# Patient Record
Sex: FEMALE | Race: WHITE | Employment: FULL TIME | URBAN - NONMETROPOLITAN AREA
[De-identification: names, ages, dates, MRNs, and addresses within clinical notes are randomized per-mention and may not be internally consistent; named-entity substitution may affect disease eponyms.]

---

## 2017-04-28 ENCOUNTER — OFFICE VISIT (OUTPATIENT)
Dept: FAMILY MEDICINE CLINIC | Age: 47
End: 2017-04-28

## 2017-04-28 VITALS
TEMPERATURE: 98.1 F | RESPIRATION RATE: 16 BRPM | WEIGHT: 147.8 LBS | DIASTOLIC BLOOD PRESSURE: 76 MMHG | HEART RATE: 73 BPM | BODY MASS INDEX: 27.2 KG/M2 | SYSTOLIC BLOOD PRESSURE: 124 MMHG | HEIGHT: 62 IN

## 2017-04-28 DIAGNOSIS — F41.9 ANXIETY: Primary | ICD-10-CM

## 2017-04-28 DIAGNOSIS — E03.4 HYPOTHYROIDISM DUE TO ACQUIRED ATROPHY OF THYROID: ICD-10-CM

## 2017-04-28 DIAGNOSIS — F32.A DEPRESSION, UNSPECIFIED DEPRESSION TYPE: ICD-10-CM

## 2017-04-28 DIAGNOSIS — R23.2 HOT FLASHES: ICD-10-CM

## 2017-04-28 PROCEDURE — 99214 OFFICE O/P EST MOD 30 MIN: CPT | Performed by: FAMILY MEDICINE

## 2017-04-28 RX ORDER — VENLAFAXINE HYDROCHLORIDE 150 MG/1
150 CAPSULE, EXTENDED RELEASE ORAL DAILY
Qty: 30 CAPSULE | Refills: 3 | Status: SHIPPED | OUTPATIENT
Start: 2017-04-28 | End: 2017-09-07 | Stop reason: SDUPTHER

## 2017-04-28 ASSESSMENT — ENCOUNTER SYMPTOMS
CHEST TIGHTNESS: 0
BLOOD IN STOOL: 0
EYE PAIN: 0
COUGH: 0
RHINORRHEA: 0
SHORTNESS OF BREATH: 0
NAUSEA: 0
DIARRHEA: 0
CONSTIPATION: 0
SORE THROAT: 0
WHEEZING: 0
VOMITING: 0
ABDOMINAL PAIN: 0
BACK PAIN: 0

## 2017-04-29 LAB
ABSOLUTE BASO #: 0.1 K/UL (ref 0–0.1)
ABSOLUTE EOS #: 0.1 K/UL (ref 0.1–0.4)
ABSOLUTE LYMPH #: 1.5 K/UL (ref 0.8–5.2)
ABSOLUTE MONO #: 0.5 K/UL (ref 0.1–0.9)
ABSOLUTE NEUT #: 2.7 K/UL (ref 1.3–9.1)
ALBUMIN SERPL-MCNC: 4.5 G/DL (ref 3.2–5.3)
ALK PHOSPHATASE: 58 IU/L (ref 35–121)
ALT SERPL-CCNC: 16 IU/L (ref 5–59)
ANION GAP SERPL CALCULATED.3IONS-SCNC: 9 MMOL/L
AST SERPL-CCNC: 18 IU/L (ref 10–42)
BASOPHILS RELATIVE PERCENT: 1.3 %
BILIRUB SERPL-MCNC: 0.5 MG/DL (ref 0.2–1.3)
BUN BLDV-MCNC: 12 MG/DL (ref 10–20)
CALCIUM SERPL-MCNC: 9.9 MG/DL (ref 8.7–10.8)
CHLORIDE BLD-SCNC: 103 MMOL/L (ref 95–111)
CO2: 32 MMOL/L (ref 21–32)
CREAT SERPL-MCNC: 0.8 MG/DL (ref 0.5–1.3)
EGFR AFRICAN AMERICAN: 94
EGFR IF NONAFRICAN AMERICAN: 78
EOSINOPHILS RELATIVE PERCENT: 2.1 %
FOLLICLE STIMULATING HORMONE: 15.7 MIU/ML
GLUCOSE: 89 MG/DL (ref 70–100)
HCT VFR BLD CALC: 45 % (ref 36–48)
HEMOGLOBIN: 14.9 G/DL (ref 12–16)
LYMPHOCYTE %: 30.8 %
MCH RBC QN AUTO: 29.6 PG (ref 27–34)
MCHC RBC AUTO-ENTMCNC: 33.1 G/DL (ref 31–36)
MCV RBC AUTO: 89.3 FL (ref 80–100)
MONOCYTES # BLD: 10 %
NEUTROPHILS RELATIVE PERCENT: 55.6 %
PDW BLD-RTO: 12.5 % (ref 10.8–14.8)
PLATELETS: 216 K/UL (ref 150–450)
POTASSIUM SERPL-SCNC: 4.4 MMOL/L (ref 3.5–5.4)
RBC: 5.04 M/UL (ref 4–5.5)
SODIUM BLD-SCNC: 140 MMOL/L (ref 134–147)
T4 FREE: 1.06 NG/DL (ref 0.8–1.8)
TOTAL PROTEIN: 7.2 G/DL (ref 5.8–8)
TSH SERPL DL<=0.05 MIU/L-ACNC: 3.16 UIU/ML (ref 0.4–4.4)
WBC: 4.8 K/UL (ref 3.7–10.8)

## 2017-05-01 LAB — ESTROGEN TOTAL: 234 PG/ML

## 2017-05-02 ENCOUNTER — TELEPHONE (OUTPATIENT)
Dept: FAMILY MEDICINE CLINIC | Age: 47
End: 2017-05-02

## 2017-08-15 ENCOUNTER — TELEPHONE (OUTPATIENT)
Dept: FAMILY MEDICINE CLINIC | Age: 47
End: 2017-08-15

## 2017-08-15 DIAGNOSIS — R10.84 GENERALIZED ABDOMINAL PAIN: Primary | ICD-10-CM

## 2017-08-15 RX ORDER — SUCRALFATE 1 G/1
1 TABLET ORAL 4 TIMES DAILY
Qty: 120 TABLET | Refills: 5 | Status: SHIPPED | OUTPATIENT
Start: 2017-08-15 | End: 2018-04-30 | Stop reason: ALTCHOICE

## 2017-09-07 DIAGNOSIS — F41.9 ANXIETY: ICD-10-CM

## 2017-09-07 DIAGNOSIS — F32.A DEPRESSION, UNSPECIFIED DEPRESSION TYPE: ICD-10-CM

## 2017-09-07 RX ORDER — VENLAFAXINE HYDROCHLORIDE 150 MG/1
CAPSULE, EXTENDED RELEASE ORAL
Qty: 30 CAPSULE | Refills: 3 | Status: SHIPPED | OUTPATIENT
Start: 2017-09-07 | End: 2018-01-18 | Stop reason: SDUPTHER

## 2018-01-18 DIAGNOSIS — F32.A DEPRESSION, UNSPECIFIED DEPRESSION TYPE: ICD-10-CM

## 2018-01-18 DIAGNOSIS — F41.9 ANXIETY: ICD-10-CM

## 2018-01-18 RX ORDER — VENLAFAXINE HYDROCHLORIDE 150 MG/1
CAPSULE, EXTENDED RELEASE ORAL
Qty: 30 CAPSULE | Refills: 2 | Status: SHIPPED | OUTPATIENT
Start: 2018-01-18 | End: 2018-04-27 | Stop reason: SDUPTHER

## 2018-04-27 DIAGNOSIS — F41.9 ANXIETY: ICD-10-CM

## 2018-04-27 DIAGNOSIS — F32.A DEPRESSION, UNSPECIFIED DEPRESSION TYPE: ICD-10-CM

## 2018-04-27 RX ORDER — VENLAFAXINE HYDROCHLORIDE 150 MG/1
CAPSULE, EXTENDED RELEASE ORAL
Qty: 30 CAPSULE | Refills: 0 | Status: SHIPPED | OUTPATIENT
Start: 2018-04-27 | End: 2018-04-30 | Stop reason: SDUPTHER

## 2018-04-30 ENCOUNTER — OFFICE VISIT (OUTPATIENT)
Dept: FAMILY MEDICINE CLINIC | Age: 48
End: 2018-04-30
Payer: COMMERCIAL

## 2018-04-30 VITALS
DIASTOLIC BLOOD PRESSURE: 84 MMHG | SYSTOLIC BLOOD PRESSURE: 122 MMHG | BODY MASS INDEX: 25.73 KG/M2 | RESPIRATION RATE: 12 BRPM | HEIGHT: 63 IN | HEART RATE: 72 BPM | WEIGHT: 145.2 LBS

## 2018-04-30 DIAGNOSIS — F32.A DEPRESSION, UNSPECIFIED DEPRESSION TYPE: ICD-10-CM

## 2018-04-30 DIAGNOSIS — E03.9 HYPOTHYROIDISM, UNSPECIFIED TYPE: ICD-10-CM

## 2018-04-30 DIAGNOSIS — Z00.00 WELL ADULT EXAM: Primary | ICD-10-CM

## 2018-04-30 DIAGNOSIS — F41.9 ANXIETY: ICD-10-CM

## 2018-04-30 PROCEDURE — 99396 PREV VISIT EST AGE 40-64: CPT | Performed by: FAMILY MEDICINE

## 2018-04-30 RX ORDER — LEVOTHYROXINE SODIUM 0.05 MG/1
50 TABLET ORAL DAILY
Qty: 30 TABLET | Refills: 11 | Status: SHIPPED | OUTPATIENT
Start: 2018-04-30 | End: 2018-10-31 | Stop reason: SDUPTHER

## 2018-04-30 RX ORDER — VENLAFAXINE HYDROCHLORIDE 150 MG/1
CAPSULE, EXTENDED RELEASE ORAL
Qty: 30 CAPSULE | Refills: 11 | Status: SHIPPED | OUTPATIENT
Start: 2018-04-30 | End: 2018-10-31 | Stop reason: SDUPTHER

## 2018-04-30 ASSESSMENT — ENCOUNTER SYMPTOMS
VOMITING: 0
DIARRHEA: 0
RHINORRHEA: 0
BACK PAIN: 0
ABDOMINAL PAIN: 0
CONSTIPATION: 0
COUGH: 0
SHORTNESS OF BREATH: 0
NAUSEA: 0
BLOOD IN STOOL: 0
EYE PAIN: 0
SORE THROAT: 0
CHEST TIGHTNESS: 0
WHEEZING: 0

## 2018-04-30 ASSESSMENT — PATIENT HEALTH QUESTIONNAIRE - PHQ9
SUM OF ALL RESPONSES TO PHQ QUESTIONS 1-9: 0
2. FEELING DOWN, DEPRESSED OR HOPELESS: 0
SUM OF ALL RESPONSES TO PHQ9 QUESTIONS 1 & 2: 0
1. LITTLE INTEREST OR PLEASURE IN DOING THINGS: 0

## 2018-05-01 ENCOUNTER — TELEPHONE (OUTPATIENT)
Dept: FAMILY MEDICINE CLINIC | Age: 48
End: 2018-05-01

## 2018-05-01 LAB
ABSOLUTE BASO #: 0.1 K/UL (ref 0–0.1)
ABSOLUTE EOS #: 0.1 K/UL (ref 0.1–0.4)
ABSOLUTE LYMPH #: 1.2 K/UL (ref 0.8–5.2)
ABSOLUTE MONO #: 0.6 K/UL (ref 0.1–0.9)
ABSOLUTE NEUT #: 2.6 K/UL (ref 1.3–9.1)
ALBUMIN SERPL-MCNC: 4.2 G/DL (ref 3.5–5.2)
ALK PHOSPHATASE: 40 U/L (ref 30–101)
ALT SERPL-CCNC: 26 U/L (ref 5–40)
ANION GAP SERPL CALCULATED.3IONS-SCNC: 10 MEQ/L (ref 10–19)
AST SERPL-CCNC: 20 U/L (ref 9–40)
BASOPHILS RELATIVE PERCENT: 1.5 %
BILIRUB SERPL-MCNC: 0.5 MG/DL
BUN BLDV-MCNC: 15 MG/DL (ref 8–23)
CALCIUM SERPL-MCNC: 9.6 MG/DL (ref 8.5–10.5)
CHLORIDE BLD-SCNC: 100 MEQ/L (ref 95–107)
CHOLESTEROL/HDL RATIO: 4.1
CHOLESTEROL: 217 MG/DL
CO2: 31 MEQ/L (ref 19–31)
CREAT SERPL-MCNC: 0.8 MG/DL (ref 0.6–1.3)
EGFR AFRICAN AMERICAN: 102.5 ML/MIN/1.73 M2
EGFR IF NONAFRICAN AMERICAN: 88.4 ML/MIN/1.73 M2
EOSINOPHILS RELATIVE PERCENT: 2.4 %
GLUCOSE: 90 MG/DL (ref 70–99)
HCT VFR BLD CALC: 46.6 % (ref 36–48)
HDLC SERPL-MCNC: 53 MG/DL
HEMOGLOBIN: 15.5 G/DL (ref 12–16)
LDL CHOLESTEROL CALCULATED: 146 MG/DL
LDL/HDL RATIO: 2.8
LYMPHOCYTE %: 27 %
MCH RBC QN AUTO: 29.5 PG (ref 27–34)
MCHC RBC AUTO-ENTMCNC: 33.3 G/DL (ref 31–36)
MCV RBC AUTO: 88.6 FL (ref 80–100)
MONOCYTES # BLD: 12.7 %
NEUTROPHILS RELATIVE PERCENT: 56.2 %
PDW BLD-RTO: 12.5 % (ref 10.8–14.8)
PLATELETS: 224 K/UL (ref 150–450)
POTASSIUM SERPL-SCNC: 4.3 MEQ/L (ref 3.5–5.4)
RBC: 5.26 M/UL (ref 4–5.5)
SODIUM BLD-SCNC: 141 MEQ/L (ref 135–146)
T4 FREE: 1.15 NG/DL (ref 0.8–1.9)
TOTAL PROTEIN: 5.9 G/DL (ref 6.1–8.3)
TRIGL SERPL-MCNC: 89 MG/DL
TSH SERPL DL<=0.05 MIU/L-ACNC: 5.79 UIU/ML (ref 0.4–4.1)
VLDLC SERPL CALC-MCNC: 18 MG/DL
WBC: 4.6 K/UL (ref 3.7–10.8)

## 2018-10-31 DIAGNOSIS — F41.9 ANXIETY: ICD-10-CM

## 2018-10-31 DIAGNOSIS — F32.A DEPRESSION, UNSPECIFIED DEPRESSION TYPE: ICD-10-CM

## 2018-10-31 DIAGNOSIS — E03.9 HYPOTHYROIDISM, UNSPECIFIED TYPE: ICD-10-CM

## 2018-10-31 RX ORDER — VENLAFAXINE HYDROCHLORIDE 150 MG/1
CAPSULE, EXTENDED RELEASE ORAL
Qty: 90 CAPSULE | Refills: 1 | Status: SHIPPED | OUTPATIENT
Start: 2018-10-31 | End: 2019-03-01 | Stop reason: SDUPTHER

## 2018-10-31 RX ORDER — LEVOTHYROXINE SODIUM 0.05 MG/1
50 TABLET ORAL DAILY
Qty: 90 TABLET | Refills: 1 | Status: SHIPPED | OUTPATIENT
Start: 2018-10-31 | End: 2019-03-26 | Stop reason: SDUPTHER

## 2019-02-22 ENCOUNTER — TELEPHONE (OUTPATIENT)
Dept: FAMILY MEDICINE CLINIC | Age: 49
End: 2019-02-22

## 2019-03-01 DIAGNOSIS — F32.A DEPRESSION, UNSPECIFIED DEPRESSION TYPE: ICD-10-CM

## 2019-03-01 DIAGNOSIS — F41.9 ANXIETY: ICD-10-CM

## 2019-03-01 RX ORDER — VENLAFAXINE HYDROCHLORIDE 150 MG/1
CAPSULE, EXTENDED RELEASE ORAL
Qty: 90 CAPSULE | Refills: 0 | Status: SHIPPED | OUTPATIENT
Start: 2019-03-01 | End: 2019-03-26 | Stop reason: SDUPTHER

## 2019-03-26 ENCOUNTER — OFFICE VISIT (OUTPATIENT)
Dept: FAMILY MEDICINE CLINIC | Age: 49
End: 2019-03-26
Payer: COMMERCIAL

## 2019-03-26 VITALS
BODY MASS INDEX: 27 KG/M2 | RESPIRATION RATE: 12 BRPM | HEART RATE: 72 BPM | HEIGHT: 63 IN | SYSTOLIC BLOOD PRESSURE: 128 MMHG | WEIGHT: 152.4 LBS | DIASTOLIC BLOOD PRESSURE: 80 MMHG

## 2019-03-26 DIAGNOSIS — F41.9 ANXIETY: ICD-10-CM

## 2019-03-26 DIAGNOSIS — Z00.00 WELL ADULT EXAM: Primary | ICD-10-CM

## 2019-03-26 DIAGNOSIS — F32.A DEPRESSION, UNSPECIFIED DEPRESSION TYPE: ICD-10-CM

## 2019-03-26 DIAGNOSIS — E78.00 HYPERCHOLESTEROLEMIA: ICD-10-CM

## 2019-03-26 DIAGNOSIS — E03.9 HYPOTHYROIDISM, UNSPECIFIED TYPE: ICD-10-CM

## 2019-03-26 LAB
ALBUMIN SERPL-MCNC: 4.4 G/DL (ref 3.5–5.1)
ALP BLD-CCNC: 61 U/L (ref 38–126)
ALT SERPL-CCNC: 11 U/L (ref 11–66)
ANION GAP SERPL CALCULATED.3IONS-SCNC: 12 MEQ/L (ref 8–16)
AST SERPL-CCNC: 17 U/L (ref 5–40)
BASOPHILS # BLD: 1.2 %
BASOPHILS ABSOLUTE: 0.1 THOU/MM3 (ref 0–0.1)
BILIRUB SERPL-MCNC: 0.7 MG/DL (ref 0.3–1.2)
BUN BLDV-MCNC: 15 MG/DL (ref 7–22)
CALCIUM SERPL-MCNC: 9.6 MG/DL (ref 8.5–10.5)
CHLORIDE BLD-SCNC: 103 MEQ/L (ref 98–111)
CHOLESTEROL, TOTAL: 199 MG/DL (ref 100–199)
CO2: 28 MEQ/L (ref 23–33)
CREAT SERPL-MCNC: 0.7 MG/DL (ref 0.4–1.2)
EOSINOPHIL # BLD: 3 %
EOSINOPHILS ABSOLUTE: 0.2 THOU/MM3 (ref 0–0.4)
ERYTHROCYTE [DISTWIDTH] IN BLOOD BY AUTOMATED COUNT: 12.9 % (ref 11.5–14.5)
ERYTHROCYTE [DISTWIDTH] IN BLOOD BY AUTOMATED COUNT: 44.7 FL (ref 35–45)
GFR SERPL CREATININE-BSD FRML MDRD: 89 ML/MIN/1.73M2
GLUCOSE BLD-MCNC: 90 MG/DL (ref 70–108)
HCT VFR BLD CALC: 46 % (ref 37–47)
HDLC SERPL-MCNC: 60 MG/DL
HEMOGLOBIN: 14.6 GM/DL (ref 12–16)
IMMATURE GRANS (ABS): 0.01 THOU/MM3 (ref 0–0.07)
IMMATURE GRANULOCYTES: 0.2 %
LDL CHOLESTEROL CALCULATED: 123 MG/DL
LYMPHOCYTES # BLD: 26.5 %
LYMPHOCYTES ABSOLUTE: 1.4 THOU/MM3 (ref 1–4.8)
MCH RBC QN AUTO: 29.7 PG (ref 26–33)
MCHC RBC AUTO-ENTMCNC: 31.7 GM/DL (ref 32.2–35.5)
MCV RBC AUTO: 93.7 FL (ref 81–99)
MONOCYTES # BLD: 12.1 %
MONOCYTES ABSOLUTE: 0.6 THOU/MM3 (ref 0.4–1.3)
NUCLEATED RED BLOOD CELLS: 0 /100 WBC
PLATELET # BLD: 223 THOU/MM3 (ref 130–400)
PMV BLD AUTO: 11.2 FL (ref 9.4–12.4)
POTASSIUM SERPL-SCNC: 5.1 MEQ/L (ref 3.5–5.2)
RBC # BLD: 4.91 MILL/MM3 (ref 4.2–5.4)
SEG NEUTROPHILS: 57 %
SEGMENTED NEUTROPHILS ABSOLUTE COUNT: 2.9 THOU/MM3 (ref 1.8–7.7)
SODIUM BLD-SCNC: 143 MEQ/L (ref 135–145)
TOTAL PROTEIN: 7.1 G/DL (ref 6.1–8)
TRIGL SERPL-MCNC: 82 MG/DL (ref 0–199)
TSH SERPL DL<=0.05 MIU/L-ACNC: 4.34 UIU/ML (ref 0.4–4.2)
WBC # BLD: 5.1 THOU/MM3 (ref 4.8–10.8)

## 2019-03-26 PROCEDURE — 99396 PREV VISIT EST AGE 40-64: CPT | Performed by: FAMILY MEDICINE

## 2019-03-26 PROCEDURE — 36415 COLL VENOUS BLD VENIPUNCTURE: CPT | Performed by: FAMILY MEDICINE

## 2019-03-26 RX ORDER — VENLAFAXINE HYDROCHLORIDE 150 MG/1
CAPSULE, EXTENDED RELEASE ORAL
Qty: 90 CAPSULE | Refills: 3 | Status: SHIPPED | OUTPATIENT
Start: 2019-03-26 | End: 2019-05-22 | Stop reason: SDUPTHER

## 2019-03-26 RX ORDER — LEVOTHYROXINE SODIUM 0.05 MG/1
50 TABLET ORAL DAILY
Qty: 90 TABLET | Refills: 3 | Status: SHIPPED | OUTPATIENT
Start: 2019-03-26 | End: 2019-03-27 | Stop reason: DRUGHIGH

## 2019-03-26 ASSESSMENT — ENCOUNTER SYMPTOMS
RHINORRHEA: 0
VOMITING: 0
ABDOMINAL PAIN: 0
BACK PAIN: 0
EYE PAIN: 0
SORE THROAT: 0
NAUSEA: 0
SHORTNESS OF BREATH: 0
CHEST TIGHTNESS: 0
BLOOD IN STOOL: 0
WHEEZING: 0
DIARRHEA: 0
COUGH: 0
CONSTIPATION: 0

## 2019-03-26 ASSESSMENT — PATIENT HEALTH QUESTIONNAIRE - PHQ9
SUM OF ALL RESPONSES TO PHQ QUESTIONS 1-9: 0
SUM OF ALL RESPONSES TO PHQ QUESTIONS 1-9: 0
SUM OF ALL RESPONSES TO PHQ9 QUESTIONS 1 & 2: 0
2. FEELING DOWN, DEPRESSED OR HOPELESS: 0
1. LITTLE INTEREST OR PLEASURE IN DOING THINGS: 0

## 2019-03-27 ENCOUNTER — TELEPHONE (OUTPATIENT)
Dept: FAMILY MEDICINE CLINIC | Age: 49
End: 2019-03-27

## 2019-03-27 DIAGNOSIS — E03.4 HYPOTHYROIDISM DUE TO ACQUIRED ATROPHY OF THYROID: Primary | ICD-10-CM

## 2019-03-27 LAB — T4 FREE: 1.41 NG/DL (ref 0.93–1.76)

## 2019-03-27 RX ORDER — LEVOTHYROXINE SODIUM 0.07 MG/1
75 TABLET ORAL DAILY
Qty: 90 TABLET | Refills: 3 | Status: SHIPPED | OUTPATIENT
Start: 2019-03-27 | End: 2019-05-22 | Stop reason: SDUPTHER

## 2019-05-22 DIAGNOSIS — E03.4 HYPOTHYROIDISM DUE TO ACQUIRED ATROPHY OF THYROID: ICD-10-CM

## 2019-05-22 DIAGNOSIS — F32.A DEPRESSION, UNSPECIFIED DEPRESSION TYPE: ICD-10-CM

## 2019-05-22 DIAGNOSIS — F41.9 ANXIETY: ICD-10-CM

## 2019-05-22 RX ORDER — VENLAFAXINE HYDROCHLORIDE 150 MG/1
CAPSULE, EXTENDED RELEASE ORAL
Qty: 90 CAPSULE | Refills: 2 | Status: SHIPPED | OUTPATIENT
Start: 2019-05-22 | End: 2020-04-08 | Stop reason: SDUPTHER

## 2019-05-22 RX ORDER — LEVOTHYROXINE SODIUM 0.07 MG/1
75 TABLET ORAL DAILY
Qty: 90 TABLET | Refills: 2 | Status: SHIPPED | OUTPATIENT
Start: 2019-05-22 | End: 2020-04-08 | Stop reason: SDUPTHER

## 2019-05-22 NOTE — TELEPHONE ENCOUNTER
Patient states that she can not longer get her long term meds locally, she has to go through her mail away, [facility]. Can you send her Synthroid 50 mcg and Effexor 150 mg to them for her?   Call her back at [phone] only if any problems

## 2019-06-10 ENCOUNTER — TELEPHONE (OUTPATIENT)
Dept: FAMILY MEDICINE CLINIC | Age: 49
End: 2019-06-10

## 2019-07-02 ENCOUNTER — TELEPHONE (OUTPATIENT)
Dept: FAMILY MEDICINE CLINIC | Age: 49
End: 2019-07-02

## 2019-08-22 ENCOUNTER — OFFICE VISIT (OUTPATIENT)
Dept: FAMILY MEDICINE CLINIC | Age: 49
End: 2019-08-22
Payer: COMMERCIAL

## 2019-08-22 VITALS
SYSTOLIC BLOOD PRESSURE: 132 MMHG | WEIGHT: 162 LBS | BODY MASS INDEX: 28.7 KG/M2 | HEART RATE: 84 BPM | RESPIRATION RATE: 12 BRPM | DIASTOLIC BLOOD PRESSURE: 86 MMHG

## 2019-08-22 DIAGNOSIS — F32.A ACUTE DEPRESSION: Primary | ICD-10-CM

## 2019-08-22 DIAGNOSIS — R41.840 POOR CONCENTRATION: ICD-10-CM

## 2019-08-22 PROCEDURE — 99213 OFFICE O/P EST LOW 20 MIN: CPT | Performed by: FAMILY MEDICINE

## 2019-08-22 RX ORDER — DEXTROAMPHETAMINE SACCHARATE, AMPHETAMINE ASPARTATE MONOHYDRATE, DEXTROAMPHETAMINE SULFATE AND AMPHETAMINE SULFATE 3.75; 3.75; 3.75; 3.75 MG/1; MG/1; MG/1; MG/1
15 CAPSULE, EXTENDED RELEASE ORAL EVERY MORNING
Qty: 30 CAPSULE | Refills: 0 | Status: SHIPPED | OUTPATIENT
Start: 2019-08-22 | End: 2019-09-17 | Stop reason: SDUPTHER

## 2019-08-22 ASSESSMENT — ENCOUNTER SYMPTOMS
CONSTIPATION: 0
RHINORRHEA: 0
VOMITING: 0
SHORTNESS OF BREATH: 0
COUGH: 0
DIARRHEA: 0
EYE PAIN: 0
SORE THROAT: 0
NAUSEA: 1
ABDOMINAL PAIN: 0
WHEEZING: 0
CHEST TIGHTNESS: 0
BACK PAIN: 0
BLOOD IN STOOL: 0

## 2019-08-22 NOTE — PATIENT INSTRUCTIONS
\"I am hopeful\"; \"Things will get better\"; and \"I can ask for the help I need. \" Write down these statements and read them often, even if you don't believe them yet. · Be patient with yourself. It took time for your depression to develop, and it will take time for your symptoms to improve. Do not take on too much or be too hard on yourself. · Learn all you can about depression from written and online materials. · Check out behavioral health classes to learn more about dealing with depression. · Keep the numbers for these national suicide hotlines: 4-323-106-TALK (3-973.797.5496) and 9-513-NFASCRJ (3-545.626.8448). If you or someone you know talks about suicide or feeling hopeless, get help right away. When should you call for help? Call 911 anytime you think you may need emergency care. For example, call if:    · You feel you cannot stop from hurting yourself or someone else.   [facility] your doctor now or seek immediate medical care if:    · You hear voices.     · You feel much more depressed.    Watch closely for changes in your health, and be sure to contact your doctor if:    · You are having problems with your depression medicine.     · You are not getting better as expected. Where can you learn more? Go to [email or web address] [email or web address].[email or web address]. org and sign in to your [employer] account. Enter J411 in the [employer] box to learn more about \"Depression Treatment: Care Instructions. \"     If you do not have an account, please click on the \"Sign Up Now\" link. Current as of: September 11, 2018  Content Version: 12.1  © 5232-4320 Healthwise, Incorporated. Care instructions adapted under license by [facility] ([facility]). If you have questions about a medical condition or this instruction, always ask your healthcare professional. [address] any warranty or liability for your use of this information.

## 2019-08-22 NOTE — PROGRESS NOTES
Subjective:      Patient ID: Mahogany Manning is a 51 y.o. female. HPI  Pt here for a check up. Reviewed BMI of 29. Encouraged diet, exercise and weight loss. Reviewed health maintenance. Having trouble with depression, anxiety, concentration. Headaches , nausea, myalgias. Is on effexor 150 mg daily. , current smoker, pmh reviewed. Review of Systems   Constitutional: Positive for fatigue. Negative for chills, fever and unexpected weight change. HENT: Negative for congestion, ear pain, rhinorrhea and sore throat. Eyes: Negative for pain and visual disturbance. Respiratory: Negative for cough, chest tightness, shortness of breath and wheezing. Cardiovascular: Negative for chest pain and palpitations. Gastrointestinal: Positive for nausea. Negative for abdominal pain, blood in stool, constipation, diarrhea and vomiting. Genitourinary: Negative for difficulty urinating, frequency, hematuria and urgency. Musculoskeletal: Negative for back pain, joint swelling, myalgias and neck pain. Skin: Negative for rash. Neurological: Positive for headaches. Negative for dizziness. Hematological: Negative for adenopathy. Does not bruise/bleed easily. Psychiatric/Behavioral: Positive for decreased concentration, dysphoric mood and sleep disturbance. Negative for behavioral problems. The patient is nervous/anxious. Objective:   Physical Exam   Constitutional: She is oriented to person, place, and time. She appears well-developed and well-nourished. HENT:   Head: Normocephalic and atraumatic. Right Ear: External ear normal. Decreased hearing is noted. Left Ear: External ear normal. Decreased hearing is noted. Nose: Nose normal.   Mouth/Throat: Oropharynx is clear and moist.   Bilateral hearing aides   Eyes: Pupils are equal, round, and reactive to light. EOM are normal.   Neck: Neck supple. No thyromegaly present.    Cardiovascular: Normal rate, regular rhythm and normal heart

## 2019-09-17 DIAGNOSIS — F32.A ACUTE DEPRESSION: ICD-10-CM

## 2019-09-17 DIAGNOSIS — R41.840 POOR CONCENTRATION: ICD-10-CM

## 2019-09-17 RX ORDER — DEXTROAMPHETAMINE SACCHARATE, AMPHETAMINE ASPARTATE MONOHYDRATE, DEXTROAMPHETAMINE SULFATE AND AMPHETAMINE SULFATE 3.75; 3.75; 3.75; 3.75 MG/1; MG/1; MG/1; MG/1
15 CAPSULE, EXTENDED RELEASE ORAL EVERY MORNING
Qty: 30 CAPSULE | Refills: 0 | Status: SHIPPED | OUTPATIENT
Start: 2019-09-20 | End: 2019-10-22 | Stop reason: ALTCHOICE

## 2019-09-17 RX ORDER — DEXTROAMPHETAMINE SACCHARATE, AMPHETAMINE ASPARTATE MONOHYDRATE, DEXTROAMPHETAMINE SULFATE AND AMPHETAMINE SULFATE 3.75; 3.75; 3.75; 3.75 MG/1; MG/1; MG/1; MG/1
CAPSULE, EXTENDED RELEASE ORAL
Qty: 30 CAPSULE | Refills: 0 | OUTPATIENT
Start: 2019-09-17

## 2019-09-17 NOTE — TELEPHONE ENCOUNTER
ep'ed adderall to pharm requested      Controlled Substance Monitoring:    Acute and Chronic Pain Monitoring:   RX Monitoring 9/17/2019   Periodic Controlled Substance Monitoring No signs of potential drug abuse or diversion identified.

## 2019-10-21 DIAGNOSIS — R41.840 POOR CONCENTRATION: ICD-10-CM

## 2019-10-21 DIAGNOSIS — F32.A ACUTE DEPRESSION: ICD-10-CM

## 2019-10-22 RX ORDER — DEXTROAMPHETAMINE SACCHARATE, AMPHETAMINE ASPARTATE MONOHYDRATE, DEXTROAMPHETAMINE SULFATE AND AMPHETAMINE SULFATE 3.75; 3.75; 3.75; 3.75 MG/1; MG/1; MG/1; MG/1
CAPSULE, EXTENDED RELEASE ORAL
Qty: 30 CAPSULE | Refills: 0 | OUTPATIENT
Start: 2019-10-22

## 2019-10-22 RX ORDER — DEXTROAMPHETAMINE SACCHARATE, AMPHETAMINE ASPARTATE MONOHYDRATE, DEXTROAMPHETAMINE SULFATE AND AMPHETAMINE SULFATE 3.75; 3.75; 3.75; 3.75 MG/1; MG/1; MG/1; MG/1
15 CAPSULE, EXTENDED RELEASE ORAL EVERY MORNING
Qty: 30 CAPSULE | Refills: 0 | Status: SHIPPED | OUTPATIENT
Start: 2019-10-22 | End: 2019-11-21 | Stop reason: SDUPTHER

## 2019-11-21 DIAGNOSIS — F32.A ACUTE DEPRESSION: ICD-10-CM

## 2019-11-21 DIAGNOSIS — R41.840 POOR CONCENTRATION: ICD-10-CM

## 2019-11-21 RX ORDER — DEXTROAMPHETAMINE SACCHARATE, AMPHETAMINE ASPARTATE MONOHYDRATE, DEXTROAMPHETAMINE SULFATE AND AMPHETAMINE SULFATE 3.75; 3.75; 3.75; 3.75 MG/1; MG/1; MG/1; MG/1
15 CAPSULE, EXTENDED RELEASE ORAL EVERY MORNING
Qty: 30 CAPSULE | Refills: 0 | Status: SHIPPED | OUTPATIENT
Start: 2019-11-21 | End: 2019-12-23 | Stop reason: ALTCHOICE

## 2019-11-21 RX ORDER — DEXTROAMPHETAMINE SACCHARATE, AMPHETAMINE ASPARTATE MONOHYDRATE, DEXTROAMPHETAMINE SULFATE AND AMPHETAMINE SULFATE 3.75; 3.75; 3.75; 3.75 MG/1; MG/1; MG/1; MG/1
CAPSULE, EXTENDED RELEASE ORAL
Qty: 30 CAPSULE | Refills: 0 | OUTPATIENT
Start: 2019-11-21

## 2019-12-23 DIAGNOSIS — R41.840 POOR CONCENTRATION: ICD-10-CM

## 2019-12-23 DIAGNOSIS — F32.A ACUTE DEPRESSION: ICD-10-CM

## 2019-12-23 RX ORDER — DEXTROAMPHETAMINE SACCHARATE, AMPHETAMINE ASPARTATE MONOHYDRATE, DEXTROAMPHETAMINE SULFATE AND AMPHETAMINE SULFATE 3.75; 3.75; 3.75; 3.75 MG/1; MG/1; MG/1; MG/1
CAPSULE, EXTENDED RELEASE ORAL
Qty: 30 CAPSULE | OUTPATIENT
Start: 2019-12-23

## 2019-12-23 RX ORDER — DEXTROAMPHETAMINE SACCHARATE, AMPHETAMINE ASPARTATE MONOHYDRATE, DEXTROAMPHETAMINE SULFATE AND AMPHETAMINE SULFATE 3.75; 3.75; 3.75; 3.75 MG/1; MG/1; MG/1; MG/1
15 CAPSULE, EXTENDED RELEASE ORAL EVERY MORNING
Qty: 30 CAPSULE | Refills: 0 | Status: SHIPPED | OUTPATIENT
Start: 2019-12-23 | End: 2020-01-28

## 2020-01-28 RX ORDER — DEXTROAMPHETAMINE SACCHARATE, AMPHETAMINE ASPARTATE MONOHYDRATE, DEXTROAMPHETAMINE SULFATE AND AMPHETAMINE SULFATE 3.75; 3.75; 3.75; 3.75 MG/1; MG/1; MG/1; MG/1
CAPSULE, EXTENDED RELEASE ORAL
Qty: 30 CAPSULE | OUTPATIENT
Start: 2020-01-28

## 2020-01-28 RX ORDER — DEXTROAMPHETAMINE SACCHARATE, AMPHETAMINE ASPARTATE MONOHYDRATE, DEXTROAMPHETAMINE SULFATE AND AMPHETAMINE SULFATE 3.75; 3.75; 3.75; 3.75 MG/1; MG/1; MG/1; MG/1
15 CAPSULE, EXTENDED RELEASE ORAL EVERY MORNING
Qty: 30 CAPSULE | Refills: 0 | Status: SHIPPED | OUTPATIENT
Start: 2020-01-28 | End: 2020-03-05 | Stop reason: SDUPTHER

## 2020-01-28 NOTE — TELEPHONE ENCOUNTER
ep'ed adderall to pharm requested    Controlled Substance Monitoring:    Acute and Chronic Pain Monitoring:   RX Monitoring 1/28/2020   Periodic Controlled Substance Monitoring No signs of potential drug abuse or diversion identified.

## 2020-03-05 RX ORDER — DEXTROAMPHETAMINE SACCHARATE, AMPHETAMINE ASPARTATE MONOHYDRATE, DEXTROAMPHETAMINE SULFATE AND AMPHETAMINE SULFATE 3.75; 3.75; 3.75; 3.75 MG/1; MG/1; MG/1; MG/1
15 CAPSULE, EXTENDED RELEASE ORAL EVERY MORNING
Qty: 30 CAPSULE | Refills: 0 | Status: SHIPPED | OUTPATIENT
Start: 2020-03-05 | End: 2020-04-08 | Stop reason: SDUPTHER

## 2020-03-05 NOTE — TELEPHONE ENCOUNTER
Lisa Ivan needs refill of   Requested Prescriptions     Pending Prescriptions Disp Refills    amphetamine-dextroamphetamine (ADDERALL XR) 15 MG extended release capsule 30 capsule 0     Sig: Take 1 capsule by mouth every morning for 30 days. F32.9, R41.840       Last Filled on:  1/28/20    Last Visit Date: 8/22/2019 depression    Next Visit Date:  3/16/20    Pt has about 1 week therapy left; pharmacy last time took 2 weeks to refill so she's calling early. Pls call pt at [phone] only if probs.     Zip: [id number]

## 2020-03-05 NOTE — TELEPHONE ENCOUNTER
ep'ed adderall to pharm requested      Controlled Substance Monitoring:    Acute and Chronic Pain Monitoring:   RX Monitoring 3/5/2020   Periodic Controlled Substance Monitoring No signs of potential drug abuse or diversion identified.

## 2020-03-07 ENCOUNTER — HOSPITAL ENCOUNTER (EMERGENCY)
Age: 50
Discharge: HOME | End: 2020-03-07
Attending: NURSE PRACTITIONER
Payer: COMMERCIAL

## 2020-03-07 VITALS
HEIGHT: 63 IN | TEMPERATURE: 98.6 F | DIASTOLIC BLOOD PRESSURE: 91 MMHG | BODY MASS INDEX: 27.46 KG/M2 | HEART RATE: 117 BPM | OXYGEN SATURATION: 98 % | RESPIRATION RATE: 16 BRPM | SYSTOLIC BLOOD PRESSURE: 154 MMHG | WEIGHT: 155 LBS

## 2020-03-07 PROCEDURE — 99202 OFFICE O/P NEW SF 15 MIN: CPT

## 2020-03-07 PROCEDURE — 99213 OFFICE O/P EST LOW 20 MIN: CPT | Performed by: NURSE PRACTITIONER

## 2020-03-07 RX ORDER — IBUPROFEN 200 MG
200 TABLET ORAL EVERY 6 HOURS PRN
COMMUNITY

## 2020-03-07 RX ORDER — ACETAMINOPHEN 500 MG
500 TABLET ORAL EVERY 6 HOURS PRN
COMMUNITY

## 2020-03-07 RX ORDER — CYCLOBENZAPRINE HCL 10 MG
10 TABLET ORAL 3 TIMES DAILY PRN
Qty: 30 TABLET | Refills: 0 | Status: SHIPPED | OUTPATIENT
Start: 2020-03-07 | End: 2020-03-17

## 2020-03-07 ASSESSMENT — PAIN DESCRIPTION - DESCRIPTORS: DESCRIPTORS: DULL;ACHING

## 2020-03-07 ASSESSMENT — ENCOUNTER SYMPTOMS
TROUBLE SWALLOWING: 0
ABDOMINAL PAIN: 0
SHORTNESS OF BREATH: 0
BACK PAIN: 1

## 2020-03-07 ASSESSMENT — PAIN DESCRIPTION - ONSET: ONSET: ON-GOING

## 2020-03-07 ASSESSMENT — PAIN DESCRIPTION - PROGRESSION: CLINICAL_PROGRESSION: GRADUALLY WORSENING

## 2020-03-07 ASSESSMENT — PAIN DESCRIPTION - PAIN TYPE: TYPE: ACUTE PAIN

## 2020-03-07 ASSESSMENT — PAIN DESCRIPTION - FREQUENCY: FREQUENCY: CONTINUOUS

## 2020-03-07 ASSESSMENT — PAIN - FUNCTIONAL ASSESSMENT: PAIN_FUNCTIONAL_ASSESSMENT: PREVENTS OR INTERFERES SOME ACTIVE ACTIVITIES AND ADLS

## 2020-03-07 ASSESSMENT — PAIN SCALES - GENERAL: PAINLEVEL_OUTOF10: 8

## 2020-03-07 NOTE — ED NOTES
Discharge instructions and prescription reviewed with pt. Pt verbalized understanding. Pt ambulated out in stable condition. Assessment unchanged upon discharge.      Peter Torre RN  03/07/20 3298

## 2020-03-07 NOTE — ED PROVIDER NOTES
ibuprofen (ADVIL;MOTRIN) 200 MG tablet Take 200 mg by mouth every 6 hours as needed for PainHistorical Med      acetaminophen (TYLENOL) 500 MG tablet Take 500 mg by mouth every 6 hours as needed for PainHistorical Med      amphetamine-dextroamphetamine (ADDERALL XR) 15 MG extended release capsule Take 1 capsule by mouth every morning for 30 days. F32.9, R41.840, Disp-30 capsule, R-0Normal      venlafaxine (EFFEXOR XR) 150 MG extended release capsule take 1 capsule by mouth once daily, Disp-90 capsule, R-2Normal      levothyroxine (SYNTHROID) 75 MCG tablet Take 1 tablet by mouth daily, Disp-90 tablet, R-2Dose adjustment due to blood workNormal      hydrocortisone 2.5 % cream Apply topically 2 times daily. , Disp-28 g, R-3, Normal             ALLERGIES     Patient is has No Known Allergies. FAMILY HISTORY     Patient'sfamily history includes Cancer in her father. SOCIAL HISTORY     Patient  reports that she has been smoking cigarettes. She has never used smokeless tobacco. She reports current alcohol use. She reports that she does not use drugs. PHYSICAL EXAM     ED TRIAGE VITALS  BP: (!) 154/91, Temp: 98.6 °F (37 °C), Pulse: 117, Resp: 16, SpO2: 98 %  Physical Exam  Vitals signs and nursing note reviewed. Constitutional:       General: She is not in acute distress. Appearance: Normal appearance. She is well-developed and normal weight. She is not ill-appearing. HENT:      Head: Normocephalic and atraumatic. Eyes:      Conjunctiva/sclera:      Right eye: Right conjunctiva is not injected. Left eye: Left conjunctiva is not injected. Pupils: Pupils are equal.   Neck:      Musculoskeletal: Normal range of motion and neck supple. No neck rigidity or muscular tenderness. Cardiovascular:      Rate and Rhythm: Normal rate and regular rhythm. Pulses: Normal pulses. Heart sounds: Normal heart sounds, S1 normal and S2 normal. No murmur. No gallop.     Pulmonary:      Effort: Pulmonary effort is normal.      Breath sounds: Normal breath sounds. Musculoskeletal: Normal range of motion. Right knee: She exhibits normal range of motion. Left knee: She exhibits normal range of motion. Thoracic back: She exhibits tenderness, pain and spasm. She exhibits normal range of motion, no bony tenderness, no swelling, no edema and no deformity. Back:    Lymphadenopathy:      Cervical: No cervical adenopathy. Skin:     General: Skin is warm and dry. Capillary Refill: Capillary refill takes 2 to 3 seconds. Neurological:      Mental Status: She is alert and oriented to person, place, and time. Psychiatric:         Mood and Affect: Mood normal.         Behavior: Behavior normal.         DIAGNOSTIC RESULTS   Labs: No results found for this visit on 03/07/20. IMAGING:    URGENT CARE COURSE:     Vitals:    03/07/20 1301   BP: (!) 154/91   Pulse: 117   Resp: 16   Temp: 98.6 °F (37 °C)   TempSrc: Temporal   SpO2: 98%   Weight: 155 lb (70.3 kg)   Height: 5' 3\" (1.6 m)       Medications - No data to display  PROCEDURES:  None  FINAL IMPRESSION      1. Spasm of thoracic back muscle        DISPOSITION/PLAN   DISPOSITION Decision To Discharge 03/07/2020 01:25:52 PM  Patient presented with right upper back pain and tightness. No known injury. Plan: On exam she has significant muscle spasm and tightness of the infrascapular and upper thoracic area. Recommend heat to relax the muscles topical muscle rubs range of motion and a muscle relaxant.   Also suggested massage and to consider chiropractic care  Prescription: Flexeril 10 mg 1 p.o. 3 times daily as needed spasm dispense 30 no refills  Follow-up: Make an appointment with your primary care provider if not improving or any concerns  PATIENT REFERRED TO:  Elmira Cogan, MD  [address] [place] [address]  [phone]    Schedule an appointment as soon as possible for a visit   As needed    DISCHARGE MEDICATIONS:  Discharge Medication List as of 3/7/2020  1:28 PM      START taking these medications    Details   cyclobenzaprine (FLEXERIL) 10 MG tablet Take 1 tablet by mouth 3 times daily as needed for Muscle spasms, Disp-30 tablet, R-0Print           Discharge Medication List as of 3/7/2020  1:28 PM          Isaías Mcelroy, [facility], APRN - [facility]  03/07/20 1792

## 2020-03-09 ENCOUNTER — TELEPHONE (OUTPATIENT)
Dept: FAMILY MEDICINE CLINIC | Age: 50
End: 2020-03-09

## 2020-03-09 NOTE — TELEPHONE ENCOUNTER
[facility] ([facility]) ED Follow up Call    Reason for ED visit:  Back pain     Mychart message sent          FU appts/Provider:    Future Appointments   Date Time Provider Basil Gresham   3/16/2020  8:15 AM Elmira Cogan, MD [address] [place] [address]

## 2020-04-08 ENCOUNTER — VIRTUAL VISIT (OUTPATIENT)
Dept: FAMILY MEDICINE CLINIC | Age: 50
End: 2020-04-08
Payer: COMMERCIAL

## 2020-04-08 PROCEDURE — 99214 OFFICE O/P EST MOD 30 MIN: CPT | Performed by: FAMILY MEDICINE

## 2020-04-08 RX ORDER — LEVOTHYROXINE SODIUM 0.07 MG/1
75 TABLET ORAL DAILY
Qty: 90 TABLET | Refills: 3 | Status: SHIPPED | OUTPATIENT
Start: 2020-04-08 | End: 2020-08-24 | Stop reason: SDUPTHER

## 2020-04-08 RX ORDER — VENLAFAXINE HYDROCHLORIDE 150 MG/1
CAPSULE, EXTENDED RELEASE ORAL
Qty: 90 CAPSULE | Refills: 3 | Status: SHIPPED | OUTPATIENT
Start: 2020-04-08 | End: 2020-04-13

## 2020-04-08 RX ORDER — DEXTROAMPHETAMINE SACCHARATE, AMPHETAMINE ASPARTATE MONOHYDRATE, DEXTROAMPHETAMINE SULFATE AND AMPHETAMINE SULFATE 3.75; 3.75; 3.75; 3.75 MG/1; MG/1; MG/1; MG/1
15 CAPSULE, EXTENDED RELEASE ORAL EVERY MORNING
Qty: 30 CAPSULE | Refills: 0 | Status: SHIPPED | OUTPATIENT
Start: 2020-04-08 | End: 2020-05-11 | Stop reason: SDUPTHER

## 2020-04-08 ASSESSMENT — ENCOUNTER SYMPTOMS
RHINORRHEA: 0
WHEEZING: 0
DIARRHEA: 0
SHORTNESS OF BREATH: 0
BACK PAIN: 0
NAUSEA: 0
ABDOMINAL PAIN: 0
CONSTIPATION: 0
CHEST TIGHTNESS: 0
EYE PAIN: 0
SORE THROAT: 0
COUGH: 0
VOMITING: 0
BLOOD IN STOOL: 0

## 2020-04-08 NOTE — PATIENT INSTRUCTIONS
Patient Education        Anxiety Disorder: Care Instructions  Your Care Instructions    Anxiety is a normal reaction to stress. Difficult situations can cause you to have symptoms such as sweaty palms and a nervous feeling. In an anxiety disorder, the symptoms are far more severe. Constant worry, muscle tension, trouble sleeping, nausea and diarrhea, and other symptoms can make normal daily activities difficult or impossible. These symptoms may occur for no reason, and they can affect your work, school, or social life. Medicines, counseling, and self-care can all help. Follow-up care is a key part of your treatment and safety. Be sure to make and go to all appointments, and call your doctor if you are having problems. It's also a good idea to know your test results and keep a list of the medicines you take. How can you care for yourself at home? · Take medicines exactly as directed. Call your doctor if you think you are having a problem with your medicine. · Go to your counseling sessions and follow-up appointments. · Recognize and accept your anxiety. Then, when you are in a situation that makes you anxious, say to yourself, \"This is not an emergency. I feel uncomfortable, but I am not in danger. I can keep going even if I feel anxious. \"  · Be kind to your body:  ? Relieve tension with exercise or a massage. ? Get enough rest.  ? Avoid alcohol, caffeine, nicotine, and illegal drugs. They can increase your anxiety level and cause sleep problems. ? Learn and do relaxation techniques. See below for more about these techniques. · Engage your mind. Get out and do something you enjoy. Go to a funny movie, or take a walk or hike. Plan your day. Having too much or too little to do can make you anxious. · Keep a record of your symptoms. Discuss your fears with a good friend or family member, or join a support group for people with similar problems. Talking to others sometimes relieves stress.   · Get involved in play a relaxation tape while you drive a car. When should you call for help? Call 911 anytime you think you may need emergency care. For example, call if:    · You feel you cannot stop from hurting yourself or someone else.   Talisha Siddiqui the numbers for these national suicide hotlines: 7-736-422-TALK (0-408.290.8988) and 4-720-KUKNRNL (9-964.225.5143). If you or someone you know talks about suicide or feeling hopeless, get help right away.   Watch closely for changes in your health, and be sure to contact your doctor if:    · You have anxiety or fear that affects your life.     · You have symptoms of anxiety that are new or different from those you had before. Where can you learn more? Go to https://LiquidpeExoeb.99taojin.com. org and sign in to your [employer] account. Enter P754 in the 51edu box to learn more about \"Anxiety Disorder: Care Instructions. \"     If you do not have an account, please click on the \"Sign Up Now\" link. Current as of: May 28, 2019Content Version: 12.4  © 0515-4191 Healthwise, Incorporated. Care instructions adapted under license by [facility] ([facility]). If you have questions about a medical condition or this instruction, always ask your healthcare professional. [address] any warranty or liability for your use of this information. Patient Education        Depression Treatment: Care Instructions  Your Care Instructions    Depression is a condition that affects the way you feel, think, and act. It causes symptoms such as low energy, loss of interest in daily activities, and sadness or grouchiness that goes on for a long time. Depression is very common and affects men and women of all ages. Depression is a medical illness caused by changes in the natural chemicals in your brain. It is not a character flaw, and it does not mean that you are a bad or weak person. It does not mean that you are going crazy.   It is important to know that depression can be treated. Medicines, counseling, and self-care can all help. Many people do not get help because they are embarrassed or think that they will get over the depression on their own. But some people do not get better without treatment. Follow-up care is a key part of your treatment and safety. Be sure to make and go to all appointments, and call your doctor if you are having problems. It's also a good idea to know your test results and keep a list of the medicines you take. How can you care for yourself at home? Learn about antidepressant medicines  Antidepressant medicines can improve or end the symptoms of depression. You may need to take the medicine for at least 6 months, and often longer. Keep taking your medicine even if you feel better. If you stop taking it too soon, your symptoms may come back or get worse. You may start to feel better within 1 to 3 weeks of taking antidepressant medicine. But it can take as many as 6 to 8 weeks to see more improvement. Talk to your doctor if you have problems with your medicine or if you do not notice any improvement after 3 weeks. Antidepressants can make you feel tired, dizzy, or nervous. Some people have dry mouth, constipation, headaches, sexual problems, an upset stomach, or diarrhea. Many of these side effects are mild and go away on their own after you take the medicine for a few weeks. Some may last longer. Talk to your doctor if side effects bother you too much. You might be able to try a different medicine. If you are pregnant or breastfeeding, talk to your doctor about what medicines you can take. Learn about counseling  In many cases, counseling can work as well as medicines to treat mild to moderate depression. Counseling is done by licensed mental health providers, such as psychologists, social workers, and some types of nurses. It can be done in one-on-one sessions or in a group setting. Many people find group sessions helpful.   Cognitive-behavioral levels help your doctor find out your risk for having a heart attack or stroke. You and your doctor can talk about whether you need to lower your risk and what treatment is best for you. A heart-healthy lifestyle along with medicines can help lower your cholesterol and your risk. The way you choose to lower your risk will depend on how high your risk is for heart attack and stroke. It will also depend on how you feel about taking medicines. Follow-up care is a key part of your treatment and safety. Be sure to make and go to all appointments, and call your doctor if you are having problems. It's also a good idea to know your test results and keep a list of the medicines you take. How can you care for yourself at home? · Eat a variety of foods every day. Good choices include fruits, vegetables, whole grains (like oatmeal), dried beans and peas, nuts and seeds, soy products (like tofu), and fat-free or low-fat dairy products. · Replace butter, margarine, and hydrogenated or partially hydrogenated oils with olive and canola oils. (Canola oil margarine without trans fat is fine.)  · Replace red meat with fish, poultry, and soy protein (like tofu). · Limit processed and packaged foods like chips, crackers, and cookies. · Bake, broil, or steam foods. Don't arias them. · Be physically active. Get at least 30 minutes of exercise on most days of the week. Walking is a good choice. You also may want to do other activities, such as running, swimming, cycling, or playing tennis or team sports. · Stay at a healthy weight or lose weight by making the changes in eating and physical activity listed above. Losing just a small amount of weight, even 5 to 10 pounds, can reduce your risk for having a heart attack or stroke. · Do not smoke. When should you call for help?   Watch closely for changes in your health, and be sure to contact your doctor if:    · You need help making lifestyle changes.     · You have questions about 3474-1012 [employer], Incorporated. Care instructions adapted under license by [facility] ([facility]). If you have questions about a medical condition or this instruction, always ask your healthcare professional. [address] any warranty or liability for your use of this information.

## 2020-04-08 NOTE — PROGRESS NOTES
Psychiatric:       [x] Normal Affect [x] No Hallucinations        [] Abnormal-     Other pertinent observable physical exam findings-     ASSESSMENT/PLAN:  1. Depression, unspecified depression type    - venlafaxine (EFFEXOR XR) 150 MG extended release capsule; take 1 capsule by mouth once daily  Dispense: 90 capsule; Refill: 3  - CBC Auto Differential; Future  - Comprehensive Metabolic Panel; Future    2. Hypothyroidism due to acquired atrophy of thyroid    - levothyroxine (SYNTHROID) 75 MCG tablet; Take 1 tablet by mouth daily  Dispense: 90 tablet; Refill: 3  - TSH without Reflex; Future  - T4, Free; Future    3. Hypercholesterolemia    - Lipid Panel; Future  - Comprehensive Metabolic Panel; Future    4. Acute depression    - amphetamine-dextroamphetamine (ADDERALL XR) 15 MG extended release capsule; Take 1 capsule by mouth every morning for 30 days. F32.9, R41.840  Dispense: 30 capsule; Refill: 0    5. Poor concentration  - amphetamine-dextroamphetamine (ADDERALL XR) 15 MG extended release capsule; Take 1 capsule by mouth every morning for 30 days. F32.9, R41.840  Dispense: 30 capsule; Refill: 0    6. Anxiety    - venlafaxine (EFFEXOR XR) 150 MG extended release capsule; take 1 capsule by mouth once daily  Dispense: 90 capsule; Refill: 3    Controlled Substance Monitoring:    Acute and Chronic Pain Monitoring:   RX Monitoring 4/8/2020   Periodic Controlled Substance Monitoring No signs of potential drug abuse or diversion identified. ;Possible medication side effects, risk of tolerance/dependence & alternative treatments discussed. No follow-ups on file. Luis Enrique Woodson is a 51 y.o. female being evaluated by a Virtual Visit (video visit) encounter to address concerns as mentioned above. A caregiver was present when appropriate.  Due to this being a TeleHealth encounter ([place] [facility] evaluation of the following organ systems was limited:

## 2020-04-13 RX ORDER — VENLAFAXINE HYDROCHLORIDE 150 MG/1
CAPSULE, EXTENDED RELEASE ORAL
Qty: 90 CAPSULE | Refills: 3 | Status: SHIPPED | OUTPATIENT
Start: 2020-04-13 | End: 2020-08-24 | Stop reason: SDUPTHER

## 2020-04-13 NOTE — TELEPHONE ENCOUNTER
Lisa Ivan needs refill of   Requested Prescriptions     Pending Prescriptions Disp Refills    venlafaxine (EFFEXOR XR) 150 MG extended release capsule [Pharmacy Med Name: VENLAFAXINE  CAP 150MG ER] 90 capsule 2     Sig: TAKE 1 CAPSULE ONCE DAILY       Last Filled on:  4/8/2020 90*3    Last Visit Date:  8/22/2019    Next Visit Date:    Visit date not found    Please send to mail in

## 2020-04-28 ENCOUNTER — HOSPITAL ENCOUNTER (OUTPATIENT)
Age: 50
Discharge: HOME | End: 2020-04-28
Payer: COMMERCIAL

## 2020-04-28 DIAGNOSIS — F32.A DEPRESSION, UNSPECIFIED DEPRESSION TYPE: ICD-10-CM

## 2020-04-28 DIAGNOSIS — E03.4 HYPOTHYROIDISM DUE TO ACQUIRED ATROPHY OF THYROID: ICD-10-CM

## 2020-04-28 DIAGNOSIS — E78.00 HYPERCHOLESTEROLEMIA: ICD-10-CM

## 2020-04-28 LAB
ALBUMIN SERPL-MCNC: 4.9 G/DL (ref 3.5–5.1)
ALP BLD-CCNC: 61 U/L (ref 38–126)
ALT SERPL-CCNC: 14 U/L (ref 11–66)
ANION GAP SERPL CALCULATED.3IONS-SCNC: 9 MEQ/L (ref 8–16)
AST SERPL-CCNC: 22 U/L (ref 5–40)
BASOPHILS # BLD: 1.1 %
BASOPHILS ABSOLUTE: 0.1 THOU/MM3 (ref 0–0.1)
BILIRUB SERPL-MCNC: 0.5 MG/DL (ref 0.3–1.2)
BUN BLDV-MCNC: 20 MG/DL (ref 7–22)
CALCIUM SERPL-MCNC: 9.9 MG/DL (ref 8.5–10.5)
CHLORIDE BLD-SCNC: 102 MEQ/L (ref 98–111)
CHOLESTEROL, TOTAL: 211 MG/DL (ref 100–199)
CO2: 26 MEQ/L (ref 23–33)
CREAT SERPL-MCNC: 0.7 MG/DL (ref 0.4–1.2)
EOSINOPHIL # BLD: 2.1 %
EOSINOPHILS ABSOLUTE: 0.1 THOU/MM3 (ref 0–0.4)
ERYTHROCYTE [DISTWIDTH] IN BLOOD BY AUTOMATED COUNT: 13.2 % (ref 11.5–14.5)
ERYTHROCYTE [DISTWIDTH] IN BLOOD BY AUTOMATED COUNT: 46.8 FL (ref 35–45)
GFR SERPL CREATININE-BSD FRML MDRD: 89 ML/MIN/1.73M2
GLUCOSE BLD-MCNC: 82 MG/DL (ref 70–108)
HCT VFR BLD CALC: 47.9 % (ref 37–47)
HDLC SERPL-MCNC: 57 MG/DL
HEMOGLOBIN: 14.9 GM/DL (ref 12–16)
IMMATURE GRANS (ABS): 0.01 THOU/MM3 (ref 0–0.07)
IMMATURE GRANULOCYTES: 0.2 %
LDL CHOLESTEROL CALCULATED: 137 MG/DL
LYMPHOCYTES # BLD: 32.4 %
LYMPHOCYTES ABSOLUTE: 1.6 THOU/MM3 (ref 1–4.8)
MCH RBC QN AUTO: 30 PG (ref 26–33)
MCHC RBC AUTO-ENTMCNC: 31.1 GM/DL (ref 32.2–35.5)
MCV RBC AUTO: 96.4 FL (ref 81–99)
MONOCYTES # BLD: 10.5 %
MONOCYTES ABSOLUTE: 0.5 THOU/MM3 (ref 0.4–1.3)
NUCLEATED RED BLOOD CELLS: 0 /100 WBC
PLATELET # BLD: 224 THOU/MM3 (ref 130–400)
PMV BLD AUTO: 11.2 FL (ref 9.4–12.4)
POTASSIUM SERPL-SCNC: 4.4 MEQ/L (ref 3.5–5.2)
RBC # BLD: 4.97 MILL/MM3 (ref 4.2–5.4)
SEG NEUTROPHILS: 53.7 %
SEGMENTED NEUTROPHILS ABSOLUTE COUNT: 2.6 THOU/MM3 (ref 1.8–7.7)
SODIUM BLD-SCNC: 137 MEQ/L (ref 135–145)
T4 FREE: 1.18 NG/DL (ref 0.93–1.76)
TOTAL PROTEIN: 7.6 G/DL (ref 6.1–8)
TRIGL SERPL-MCNC: 87 MG/DL (ref 0–199)
TSH SERPL DL<=0.05 MIU/L-ACNC: 2 UIU/ML (ref 0.4–4.2)
WBC # BLD: 4.8 THOU/MM3 (ref 4.8–10.8)

## 2020-04-28 PROCEDURE — 36415 COLL VENOUS BLD VENIPUNCTURE: CPT

## 2020-04-28 PROCEDURE — 84443 ASSAY THYROID STIM HORMONE: CPT

## 2020-04-28 PROCEDURE — 84439 ASSAY OF FREE THYROXINE: CPT

## 2020-04-28 PROCEDURE — 85025 COMPLETE CBC W/AUTO DIFF WBC: CPT

## 2020-04-28 PROCEDURE — 80053 COMPREHEN METABOLIC PANEL: CPT

## 2020-04-28 PROCEDURE — 80061 LIPID PANEL: CPT

## 2020-04-29 ENCOUNTER — TELEPHONE (OUTPATIENT)
Dept: PRIMARY CARE CLINIC | Age: 50
End: 2020-04-29

## 2020-05-11 RX ORDER — DEXTROAMPHETAMINE/AMPHETAMINE 15 MG
CAPSULE, EXT RELEASE 24 HR ORAL
Qty: 30 CAPSULE | OUTPATIENT
Start: 2020-05-11

## 2020-05-11 RX ORDER — DEXTROAMPHETAMINE SACCHARATE, AMPHETAMINE ASPARTATE MONOHYDRATE, DEXTROAMPHETAMINE SULFATE AND AMPHETAMINE SULFATE 3.75; 3.75; 3.75; 3.75 MG/1; MG/1; MG/1; MG/1
15 CAPSULE, EXTENDED RELEASE ORAL EVERY MORNING
Qty: 30 CAPSULE | Refills: 0 | Status: SHIPPED | OUTPATIENT
Start: 2020-05-11 | End: 2020-06-15 | Stop reason: ALTCHOICE

## 2020-06-15 RX ORDER — DEXTROAMPHETAMINE SACCHARATE, AMPHETAMINE ASPARTATE MONOHYDRATE, DEXTROAMPHETAMINE SULFATE AND AMPHETAMINE SULFATE 3.75; 3.75; 3.75; 3.75 MG/1; MG/1; MG/1; MG/1
15 CAPSULE, EXTENDED RELEASE ORAL EVERY MORNING
Qty: 30 CAPSULE | Refills: 0 | Status: SHIPPED | OUTPATIENT
Start: 2020-06-15 | End: 2020-07-27 | Stop reason: SDUPTHER

## 2020-06-15 RX ORDER — DEXTROAMPHETAMINE/AMPHETAMINE 15 MG
CAPSULE, EXT RELEASE 24 HR ORAL
Qty: 30 CAPSULE | OUTPATIENT
Start: 2020-06-15

## 2020-07-27 RX ORDER — DEXTROAMPHETAMINE SACCHARATE, AMPHETAMINE ASPARTATE MONOHYDRATE, DEXTROAMPHETAMINE SULFATE AND AMPHETAMINE SULFATE 3.75; 3.75; 3.75; 3.75 MG/1; MG/1; MG/1; MG/1
15 CAPSULE, EXTENDED RELEASE ORAL EVERY MORNING
Qty: 30 CAPSULE | Refills: 0 | Status: SHIPPED | OUTPATIENT
Start: 2020-07-27 | End: 2020-08-26 | Stop reason: SDUPTHER

## 2020-07-27 RX ORDER — DEXTROAMPHETAMINE/AMPHETAMINE 15 MG
CAPSULE, EXT RELEASE 24 HR ORAL
Qty: 30 CAPSULE | OUTPATIENT
Start: 2020-07-27

## 2020-08-24 ENCOUNTER — TELEPHONE (OUTPATIENT)
Dept: FAMILY MEDICINE CLINIC | Age: 50
End: 2020-08-24

## 2020-08-24 RX ORDER — LEVOTHYROXINE SODIUM 0.07 MG/1
75 TABLET ORAL DAILY
Qty: 90 TABLET | Refills: 1 | Status: SHIPPED | OUTPATIENT
Start: 2020-08-24 | End: 2021-01-18

## 2020-08-24 RX ORDER — VENLAFAXINE HYDROCHLORIDE 150 MG/1
CAPSULE, EXTENDED RELEASE ORAL
Qty: 90 CAPSULE | Refills: 1 | Status: SHIPPED | OUTPATIENT
Start: 2020-08-24 | End: 2021-01-26

## 2020-08-24 NOTE — TELEPHONE ENCOUNTER
Pt calling stating she needs her medications sent to [facility]. It looks like Effexor was sent in April but the pt hasnt refilled from [facility]  yet so it's . Rx pending. Call back only if problem.

## 2020-08-26 RX ORDER — DEXTROAMPHETAMINE SACCHARATE, AMPHETAMINE ASPARTATE MONOHYDRATE, DEXTROAMPHETAMINE SULFATE AND AMPHETAMINE SULFATE 3.75; 3.75; 3.75; 3.75 MG/1; MG/1; MG/1; MG/1
15 CAPSULE, EXTENDED RELEASE ORAL EVERY MORNING
Qty: 30 CAPSULE | Refills: 0 | Status: SHIPPED | OUTPATIENT
Start: 2020-08-26 | End: 2020-10-05 | Stop reason: SDUPTHER

## 2020-08-26 NOTE — TELEPHONE ENCOUNTER
ep'ed adderall to pharm requested    Controlled Substance Monitoring:    Acute and Chronic Pain Monitoring:   RX Monitoring 8/26/2020   Periodic Controlled Substance Monitoring No signs of potential drug abuse or diversion identified.     ' Quality 265: Biopsy Follow-Up: Biopsy results reviewed, communicated, tracked, and documented Detail Level: Detailed

## 2020-08-26 NOTE — TELEPHONE ENCOUNTER
Lisa would like her Adderall XR called to [facility] in [place].  [phone]

## 2020-10-05 RX ORDER — DEXTROAMPHETAMINE SACCHARATE, AMPHETAMINE ASPARTATE MONOHYDRATE, DEXTROAMPHETAMINE SULFATE AND AMPHETAMINE SULFATE 3.75; 3.75; 3.75; 3.75 MG/1; MG/1; MG/1; MG/1
15 CAPSULE, EXTENDED RELEASE ORAL EVERY MORNING
Qty: 30 CAPSULE | Refills: 0 | Status: SHIPPED | OUTPATIENT
Start: 2020-10-05 | End: 2020-11-03

## 2020-10-05 NOTE — TELEPHONE ENCOUNTER
Patient called to request a refill of Adderall. Her last yearly check up was on 4/08/20. She uses [facility] [facility] in [place].   Call her back at [phone] if any problems

## 2020-11-03 RX ORDER — DEXTROAMPHETAMINE/AMPHETAMINE 15 MG
CAPSULE, EXT RELEASE 24 HR ORAL
Qty: 30 CAPSULE | Refills: 0 | Status: SHIPPED | OUTPATIENT
Start: 2020-11-03 | End: 2020-12-02 | Stop reason: SDUPTHER

## 2020-11-03 NOTE — TELEPHONE ENCOUNTER
Lisa Ivan needs refill of   Requested Prescriptions     Pending Prescriptions Disp Refills    ADDERALL XR 15 MG capsule [Pharmacy Med Name: ADDERALL XR 15 MG CAPSULE] 30 capsule      Sig: take 1 capsule by mouth every morning       Last Filled on:  10/05/2020    Last Visit Date:  4/8/2020    Next Visit Date:    Visit date not found

## 2020-12-02 RX ORDER — DEXTROAMPHETAMINE SACCHARATE, AMPHETAMINE ASPARTATE MONOHYDRATE, DEXTROAMPHETAMINE SULFATE AND AMPHETAMINE SULFATE 3.75; 3.75; 3.75; 3.75 MG/1; MG/1; MG/1; MG/1
CAPSULE, EXTENDED RELEASE ORAL
Qty: 30 CAPSULE | Refills: 0 | Status: SHIPPED | OUTPATIENT
Start: 2020-12-02 | End: 2021-01-06 | Stop reason: SDUPTHER

## 2020-12-02 RX ORDER — DEXTROAMPHETAMINE/AMPHETAMINE 15 MG
CAPSULE, EXT RELEASE 24 HR ORAL
Qty: 30 CAPSULE | OUTPATIENT
Start: 2020-12-02 | End: 2021-01-01

## 2020-12-02 NOTE — TELEPHONE ENCOUNTER
Lisa Ivan needs refill of   Requested Prescriptions     Pending Prescriptions Disp Refills    ADDERALL XR 15 MG capsule [Pharmacy Med Name: ADDERALL XR 15 MG CAPSULE] 30 capsule      Sig: take 1 capsule by mouth every morning       Last Filled on:  11/3/20 #30/0    Last Visit Date:  4/8/2020    Next Visit Date:    Visit date not found

## 2020-12-02 NOTE — TELEPHONE ENCOUNTER
ep'ed adderall to pharm requested      Controlled Substance Monitoring:    Acute and Chronic Pain Monitoring:   RX Monitoring 12/2/2020   Periodic Controlled Substance Monitoring No signs of potential drug abuse or diversion identified.

## 2021-01-06 DIAGNOSIS — R41.840 POOR CONCENTRATION: ICD-10-CM

## 2021-01-06 DIAGNOSIS — F32.A ACUTE DEPRESSION: ICD-10-CM

## 2021-01-06 RX ORDER — DEXTROAMPHETAMINE SACCHARATE, AMPHETAMINE ASPARTATE MONOHYDRATE, DEXTROAMPHETAMINE SULFATE AND AMPHETAMINE SULFATE 3.75; 3.75; 3.75; 3.75 MG/1; MG/1; MG/1; MG/1
CAPSULE, EXTENDED RELEASE ORAL
Qty: 30 CAPSULE | Refills: 0 | Status: SHIPPED | OUTPATIENT
Start: 2021-01-06 | End: 2021-02-05 | Stop reason: SDUPTHER

## 2021-01-06 NOTE — TELEPHONE ENCOUNTER
Se Abbott would like her Adderall called to [facility] in [place].  [phone]

## 2021-01-06 NOTE — TELEPHONE ENCOUNTER
ep'ed adderall to pharm requested      Controlled Substance Monitoring:    Acute and Chronic Pain Monitoring:   RX Monitoring 1/6/2021   Periodic Controlled Substance Monitoring No signs of potential drug abuse or diversion identified.

## 2021-01-16 DIAGNOSIS — E03.4 HYPOTHYROIDISM DUE TO ACQUIRED ATROPHY OF THYROID: ICD-10-CM

## 2021-01-18 RX ORDER — LEVOTHYROXINE SODIUM 75 MCG
TABLET ORAL
Qty: 90 TABLET | Refills: 0 | Status: SHIPPED | OUTPATIENT
Start: 2021-01-18 | End: 2021-06-21 | Stop reason: SDUPTHER

## 2021-01-18 NOTE — TELEPHONE ENCOUNTER
Lisa Ivan needs refill of   Requested Prescriptions     Pending Prescriptions Disp Refills    SYNTHROID 75 MCG tablet [Pharmacy Med Name: Bhupinder Mccallum TAB 0.075MG] 90 tablet 1     Sig: TAKE 1 TABLET DAILY       Last Filled on:  8/24/20 90*1    Last Visit Date:  4/8/2020-VV annual    Next Visit Date:    Visit date not found       90*0 pending

## 2021-01-20 ENCOUNTER — VIRTUAL VISIT (OUTPATIENT)
Dept: FAMILY MEDICINE CLINIC | Age: 51
End: 2021-01-20
Payer: COMMERCIAL

## 2021-01-20 DIAGNOSIS — R51.9 ACUTE NONINTRACTABLE HEADACHE, UNSPECIFIED HEADACHE TYPE: Primary | ICD-10-CM

## 2021-01-20 PROCEDURE — 99213 OFFICE O/P EST LOW 20 MIN: CPT | Performed by: FAMILY MEDICINE

## 2021-01-20 ASSESSMENT — ENCOUNTER SYMPTOMS
VOMITING: 0
CHEST TIGHTNESS: 0
CONSTIPATION: 0
EYE PAIN: 0
SHORTNESS OF BREATH: 0
ABDOMINAL PAIN: 0
NAUSEA: 0
RHINORRHEA: 0
COUGH: 0
WHEEZING: 0
BLOOD IN STOOL: 0
BACK PAIN: 0
DIARRHEA: 0
SORE THROAT: 0

## 2021-01-20 NOTE — PROGRESS NOTES
Lisa Ivan (:  1970) is a 53 y.o. female,Established patient, here for evaluation of the following chief complaint(s): Headache      ASSESSMENT/PLAN:  1. Acute nonintractable headache, unspecified headache type  -     Covid-19 Ambulatory; Future  -quarantine til results are known    No follow-ups on file. SUBJECTIVE/OBJECTIVE:  HPI  Pt seen today due to headache. Started last Friday, worst yesterday, better today. Feels this is her usual migraine. No other sxs. No fever or chills. Normal taste and smell. Work is requiring testing to return. , current smoker, pmh reviewed. Review of Systems   Constitutional: Negative for chills, fatigue, fever and unexpected weight change. HENT: Negative for congestion, ear pain, rhinorrhea and sore throat. Eyes: Negative for pain and visual disturbance. Respiratory: Negative for cough, chest tightness, shortness of breath and wheezing. Cardiovascular: Negative for chest pain and palpitations. Gastrointestinal: Negative for abdominal pain, blood in stool, constipation, diarrhea, nausea and vomiting. Genitourinary: Negative for difficulty urinating, frequency, hematuria and urgency. Musculoskeletal: Negative for back pain, joint swelling, myalgias and neck pain. Skin: Negative for rash. Neurological: Positive for headaches. Negative for dizziness. Hematological: Negative for adenopathy. Does not bruise/bleed easily. Psychiatric/Behavioral: Negative for behavioral problems and sleep disturbance. The patient is not nervous/anxious. No flowsheet data found. Physical Exam  Constitutional:       General: She is not in acute distress. Appearance: Normal appearance. HENT:      Head: Normocephalic and atraumatic. Right Ear: External ear normal.      Left Ear: External ear normal.   Eyes:      General: No scleral icterus. Right eye: No discharge. Left eye: No discharge.    Neck: Musculoskeletal: Normal range of motion. Pulmonary:      Effort: Pulmonary effort is normal. No respiratory distress. Skin:     Findings: No rash. Neurological:      Mental Status: She is alert and oriented to person, place, and time. Psychiatric:         Mood and Affect: Mood normal.         Behavior: Behavior normal.                     Lisa Weiss is a 53 y.o. female being evaluated by a Virtual Visit (video visit) encounter to address concerns as mentioned above. A caregiver was present when appropriate. Due to this being a TeleHealth encounter (During [facility]-57 public health emergency), evaluation of the following organ systems was limited: Vitals/Constitutional/EENT/Resp/CV/GI//MS/Neuro/Skin/Heme-Lymph-Imm. Pursuant to the emergency declaration under the [address] [place] [address] authority and the [employer] and [employer] Act, this Virtual Visit was conducted with patient's (and/or legal guardian's) consent, to reduce the patient's risk of exposure to COVID-19 and provide necessary medical care. The patient (and/or legal guardian) has also been advised to contact this office for worsening conditions or problems, and seek emergency medical treatment and/or call 911 if deemed necessary. Patient identification was verified at the start of the visit: Yes    Services were provided through a video synchronous discussion virtually to substitute for in-person clinic visit. Patient was located at home and provider was located in office or at home. An electronic signature was used to authenticate this note.     --Magy Caceres MD

## 2021-01-21 ENCOUNTER — TELEPHONE (OUTPATIENT)
Dept: FAMILY MEDICINE CLINIC | Age: 51
End: 2021-01-21

## 2021-01-21 LAB — SARS-COV-2: NOT DETECTED

## 2021-01-21 NOTE — TELEPHONE ENCOUNTER
Called and per HIPAA left a voicemail that her Covid test was negative. Asked patient to call the office with any questions or concerns.

## 2021-01-25 DIAGNOSIS — F32.A DEPRESSION, UNSPECIFIED DEPRESSION TYPE: ICD-10-CM

## 2021-01-25 DIAGNOSIS — F41.9 ANXIETY: ICD-10-CM

## 2021-01-26 RX ORDER — VENLAFAXINE HYDROCHLORIDE 150 MG/1
CAPSULE, EXTENDED RELEASE ORAL
Qty: 90 CAPSULE | Refills: 0 | Status: SHIPPED | OUTPATIENT
Start: 2021-01-26 | End: 2021-06-21 | Stop reason: SDUPTHER

## 2021-01-26 NOTE — TELEPHONE ENCOUNTER
Lisa Ivan needs refill of   Requested Prescriptions     Pending Prescriptions Disp Refills    venlafaxine (EFFEXOR XR) 150 MG extended release capsule [Pharmacy Med Name: VENLAFAXINE  CAP 150MG ER] 90 capsule 1     Sig: TAKE 1 CAPSULE ONCE DAILY       Last Filled on:  08/2020 90*1    Last Visit Date:  1/20/2021-VV    Next Visit Date:    Visit date not found

## 2021-02-05 ENCOUNTER — HOSPITAL ENCOUNTER (EMERGENCY)
Age: 51
Discharge: HOME | End: 2021-02-05
Payer: COMMERCIAL

## 2021-02-05 VITALS
SYSTOLIC BLOOD PRESSURE: 156 MMHG | DIASTOLIC BLOOD PRESSURE: 105 MMHG | BODY MASS INDEX: 26.58 KG/M2 | HEART RATE: 94 BPM | TEMPERATURE: 98 F | HEIGHT: 63 IN | WEIGHT: 150 LBS | OXYGEN SATURATION: 96 % | RESPIRATION RATE: 16 BRPM

## 2021-02-05 DIAGNOSIS — F32.A ACUTE DEPRESSION: ICD-10-CM

## 2021-02-05 DIAGNOSIS — J20.9 ACUTE BRONCHITIS, UNSPECIFIED ORGANISM: Primary | ICD-10-CM

## 2021-02-05 DIAGNOSIS — R41.840 POOR CONCENTRATION: ICD-10-CM

## 2021-02-05 PROCEDURE — 99213 OFFICE O/P EST LOW 20 MIN: CPT | Performed by: NURSE PRACTITIONER

## 2021-02-05 PROCEDURE — 99213 OFFICE O/P EST LOW 20 MIN: CPT

## 2021-02-05 RX ORDER — PREDNISONE 20 MG/1
40 TABLET ORAL DAILY
Qty: 10 TABLET | Refills: 0 | Status: SHIPPED | OUTPATIENT
Start: 2021-02-05 | End: 2021-02-10

## 2021-02-05 RX ORDER — BENZONATATE 200 MG/1
200 CAPSULE ORAL 3 TIMES DAILY PRN
Qty: 21 CAPSULE | Refills: 0 | Status: SHIPPED | OUTPATIENT
Start: 2021-02-05 | End: 2021-02-12

## 2021-02-05 RX ORDER — DEXTROAMPHETAMINE SACCHARATE, AMPHETAMINE ASPARTATE MONOHYDRATE, DEXTROAMPHETAMINE SULFATE AND AMPHETAMINE SULFATE 3.75; 3.75; 3.75; 3.75 MG/1; MG/1; MG/1; MG/1
CAPSULE, EXTENDED RELEASE ORAL
Qty: 30 CAPSULE | Refills: 0 | Status: SHIPPED | OUTPATIENT
Start: 2021-02-05 | End: 2021-03-08 | Stop reason: SDUPTHER

## 2021-02-05 RX ORDER — GUAIFENESIN AND CODEINE PHOSPHATE 100; 10 MG/5ML; MG/5ML
5 SOLUTION ORAL 3 TIMES DAILY PRN
Qty: 50 ML | Refills: 0 | Status: SHIPPED | OUTPATIENT
Start: 2021-02-05 | End: 2021-02-08

## 2021-02-05 ASSESSMENT — ENCOUNTER SYMPTOMS
SORE THROAT: 0
CHEST TIGHTNESS: 0
SHORTNESS OF BREATH: 0
RHINORRHEA: 0
COUGH: 1
SINUS PRESSURE: 0
VOMITING: 0
NAUSEA: 0

## 2021-02-05 NOTE — ED PROVIDER NOTES
[address]  Urgent Care Encounter       CHIEF COMPLAINT       Chief Complaint   Patient presents with    Cough    Sinusitis       Nurses Notes reviewed and I agree except as noted in the HPI. HISTORY OF PRESENT ILLNESS   Lisa Fox is a 53 y.o. female who presents with c/o cough that started about one month ago. She reports cough is more productive in the morning with green phlegm and becomes more dry during the day. Cough keeps her awake at night. She has tried otc mucinex, dayquil, nyquil, ishan-seltzer, and cough drops with minimal relief. She denies congestion, sore throat or shortness of breath. No reports of fever, chills or change in taste and smell. Patient did have a negative COVID-19 test on 1/20/2021. The history is provided by the patient. REVIEW OF SYSTEMS     Review of Systems   Constitutional: Positive for fatigue. Negative for chills and fever. HENT: Negative for congestion, ear pain, rhinorrhea, sinus pressure and sore throat. Respiratory: Positive for cough. Negative for chest tightness and shortness of breath. Cardiovascular: Negative for chest pain. Gastrointestinal: Negative for nausea and vomiting. Musculoskeletal: Negative for myalgias. Skin: Negative for rash. Neurological: Negative for headaches. Psychiatric/Behavioral: Positive for sleep disturbance (Related to coughing). PAST MEDICAL HISTORY         Diagnosis Date    Depression     Hypercholesterolemia     Hypothyroidism        SURGICALHISTORY     Patient  has a past surgical history that includes Tubal ligation; Breast surgery (2007); ablation of dysrhythmic focus; and Tonsillectomy.     CURRENT MEDICATIONS       Discharge Medication List as of 2/5/2021  9:10 AM      CONTINUE these medications which have NOT CHANGED    Details   venlafaxine (EFFEXOR XR) 150 MG extended release capsule TAKE 1 CAPSULE ONCE DAILY, Disp-90 capsule, R-0Normal      SYNTHROID 75 MCG tablet TAKE 1 TABLET DAILY, Disp-90 tablet, R-0Normal      amphetamine-dextroamphetamine (ADDERALL XR) 15 MG extended release capsule take 1 capsule by mouth every morning, Disp-30 capsule, R-0Normal      ibuprofen (ADVIL;MOTRIN) 200 MG tablet Take 200 mg by mouth every 6 hours as needed for PainHistorical Med      acetaminophen (TYLENOL) 500 MG tablet Take 500 mg by mouth every 6 hours as needed for PainHistorical Med             ALLERGIES     Patient is has No Known Allergies. Patients   There is no immunization history on file for this patient. FAMILY HISTORY     Patient's family history includes Cancer in her father. SOCIAL HISTORY     Patient  reports that she has been smoking cigarettes. She has a 2.00 pack-year smoking history. She has never used smokeless tobacco. She reports current alcohol use. She reports that she does not use drugs. PHYSICAL EXAM     ED TRIAGE VITALS  BP: (!) 156/105, Temp: 98 °F (36.7 °C), Pulse: 94, Resp: 16, SpO2: 96 %,Estimated body mass index is 26.57 kg/m² as calculated from the following:    Height as of this encounter: 5' 3\" (1.6 m). Weight as of this encounter: 150 lb (68 kg). ,No LMP recorded. Patient has had an ablation. Physical Exam  Vitals signs and nursing note reviewed. Constitutional:       General: She is not in acute distress. Appearance: She is well-developed. She is not ill-appearing. HENT:      Head: Normocephalic and atraumatic. Right Ear: Tympanic membrane and ear canal normal.      Left Ear: Tympanic membrane and ear canal normal.      Nose: Nose normal.      Mouth/Throat:      Lips: Pink. Mouth: Mucous membranes are moist.      Pharynx: Oropharynx is clear. No oropharyngeal exudate or posterior oropharyngeal erythema. Eyes:      General: Lids are normal. No scleral icterus. Conjunctiva/sclera:      Right eye: Right conjunctiva is not injected. Left eye: Left conjunctiva is not injected.       Pupils: Pupils are equal.   Neck: Musculoskeletal: Normal range of motion and neck supple. Cardiovascular:      Rate and Rhythm: Normal rate and regular rhythm. Heart sounds: Normal heart sounds, S1 normal and S2 normal.   Pulmonary:      Effort: Pulmonary effort is normal. No respiratory distress. Breath sounds: Normal breath sounds. Musculoskeletal:      Comments: Normal active ROM x 4 extremities  Gait steady   Lymphadenopathy:      Comments: No head or neck adenopathy   Skin:     General: Skin is warm and dry. Findings: No rash (to exposed areas of skin). Nails: There is no clubbing. Neurological:      General: No focal deficit present. Mental Status: She is alert and oriented to person, place, and time. Sensory: No sensory deficit. Comments: Answers questions readily and appropriately   Psychiatric:         Mood and Affect: Mood normal.         Speech: Speech normal.         Behavior: Behavior normal. Behavior is cooperative. DIAGNOSTIC RESULTS     Labs:No results found for this visit on 02/05/21. IMAGING:    No orders to display         EKG:      URGENT CARE COURSE:     Vitals:    02/05/21 0826   BP: (!) 156/105   Pulse: 94   Resp: 16   Temp: 98 °F (36.7 °C)   TempSrc: Temporal   SpO2: 96%   Weight: 150 lb (68 kg)   Height: 5' 3\" (1.6 m)       Medications - No data to display         PROCEDURES:  None    FINAL IMPRESSION      1. Acute bronchitis, unspecified organism          DISPOSITION/ PLAN     Patient presents with acute bronchitis. Explained to patient that antibiotic therapy is not necessary at this time is most bronchitis is viral in etiology. Patient be treated with prednisone burst and benzonatate as needed for cough. Patient also given guaifenesin with codeine to use at night for cough and to aid in sleep. Follow-up family doctor next week if symptoms have not improved. ER for worsening condition.   Further instructions were outlined verbally and in the patient's discharge instructions. All the patient's questions were answered. The patient/parent agreed with the plan and was discharged from the [facility] in good condition. PATIENT REFERRED TO:  Yaquelin Sung MD  [address] [facility] [address]      DISCHARGE MEDICATIONS:  Discharge Medication List as of 2/5/2021  9:10 AM      START taking these medications    Details   predniSONE (DELTASONE) 20 MG tablet Take 2 tablets by mouth daily for 5 days, Disp-10 tablet, R-0Normal      guaiFENesin-codeine (TUSSI-ORGANIDIN NR) 100-10 MG/5ML syrup Take 5 mLs by mouth 3 times daily as needed for Cough for up to 3 days. , Disp-50 mL, R-0Print      benzonatate (TESSALON) 200 MG capsule Take 1 capsule by mouth 3 times daily as needed for Cough, Disp-21 capsule, R-0Normal             Discharge Medication List as of 2/5/2021  9:10 AM      STOP taking these medications       hydrocortisone 2.5 % cream Comments:   Reason for Stopping:               Discharge Medication List as of 2/5/2021  9:10 AM          DILIP Anne CNP    (Please note that portions of this note were completed with a voice recognition program. Efforts were made to edit the dictations but occasionally words are mis-transcribed.)         DILIP Anne CNP  02/05/21 2933

## 2021-02-05 NOTE — ED NOTES
Patient discharge instructions given to pt and pt verbalized understanding, px given, no other needs at this time, and pt left in stable condition.      Eliana Lei RN  02/05/21 2417

## 2021-02-05 NOTE — TELEPHONE ENCOUNTER
ep'ed adderall to pharm requested      Controlled Substance Monitoring:    Acute and Chronic Pain Monitoring:   RX Monitoring 2/5/2021   Periodic Controlled Substance Monitoring No signs of potential drug abuse or diversion identified.

## 2021-02-06 ENCOUNTER — CARE COORDINATION (OUTPATIENT)
Dept: CARE COORDINATION | Age: 51
End: 2021-02-06

## 2021-02-06 NOTE — CARE COORDINATION
Attempted to reach patient for a ED follow up in regards to COVID-19 education/ monitoring. Patient was unavailable at the time of my call, and a generic voicemail message was left asking patient to return my call at [phone].     [facility]  [address] [facility]   Medication Assistance  ALEIDA LYNN II.Emair    T)[phone] (g)[phone]

## 2021-02-23 ENCOUNTER — TELEPHONE (OUTPATIENT)
Dept: FAMILY MEDICINE CLINIC | Age: 51
End: 2021-02-23

## 2021-02-23 NOTE — TELEPHONE ENCOUNTER
Patient called in stating that last night she had troubles sleeping and is having some chest pain. States that she does not take adderall every day and she took one yesterday and she has been feeling off since then. She has been having a hard time catching her breath, nausea and chest pain, her pulse hasn't been under 100 all morning. She states that she thinks this may be her anxiety. Encouraged patient to go to ER with her symptoms. Patient agreed and will go.

## 2021-03-03 ENCOUNTER — PATIENT MESSAGE (OUTPATIENT)
Dept: FAMILY MEDICINE CLINIC | Age: 51
End: 2021-03-03

## 2021-03-03 NOTE — TELEPHONE ENCOUNTER
From: Keith Ivan  To: Ada Bernal MD  Sent: 3/3/2021 12:33 PM EST  Subject: Visit Follow-Up Question    I was in [facility] last week, all tests were normal. I am still not feeling much better. Today Im having the exact same feeling. For some reason my anxiety is uncontrollable. I cant eat, nausea, heavy breathing. Just very uncomfortable. I also would like to quit smoking, what does  recommend? Thank you for any suggestions!

## 2021-03-04 ENCOUNTER — OFFICE VISIT (OUTPATIENT)
Dept: FAMILY MEDICINE CLINIC | Age: 51
End: 2021-03-04
Payer: COMMERCIAL

## 2021-03-04 VITALS
OXYGEN SATURATION: 97 % | TEMPERATURE: 97.9 F | SYSTOLIC BLOOD PRESSURE: 106 MMHG | HEART RATE: 94 BPM | RESPIRATION RATE: 16 BRPM | WEIGHT: 161.4 LBS | BODY MASS INDEX: 28.59 KG/M2 | DIASTOLIC BLOOD PRESSURE: 74 MMHG

## 2021-03-04 DIAGNOSIS — R07.9 CHEST PAIN, UNSPECIFIED TYPE: ICD-10-CM

## 2021-03-04 DIAGNOSIS — R10.84 GENERALIZED ABDOMINAL PAIN: ICD-10-CM

## 2021-03-04 DIAGNOSIS — F41.9 ANXIETY: Primary | ICD-10-CM

## 2021-03-04 DIAGNOSIS — F17.210 CIGARETTE NICOTINE DEPENDENCE WITHOUT COMPLICATION: ICD-10-CM

## 2021-03-04 PROCEDURE — 99214 OFFICE O/P EST MOD 30 MIN: CPT | Performed by: FAMILY MEDICINE

## 2021-03-04 PROCEDURE — 1111F DSCHRG MED/CURRENT MED MERGE: CPT | Performed by: FAMILY MEDICINE

## 2021-03-04 RX ORDER — DICYCLOMINE HYDROCHLORIDE 10 MG/1
10 CAPSULE ORAL 2 TIMES DAILY WITH MEALS
Qty: 60 CAPSULE | Refills: 0 | Status: SHIPPED | OUTPATIENT
Start: 2021-03-04 | End: 2022-06-13 | Stop reason: ALTCHOICE

## 2021-03-04 RX ORDER — BUPROPION HYDROCHLORIDE 150 MG/1
150 TABLET ORAL EVERY MORNING
Qty: 30 TABLET | Refills: 3 | Status: SHIPPED | OUTPATIENT
Start: 2021-03-04 | End: 2021-06-21 | Stop reason: ALTCHOICE

## 2021-03-04 RX ORDER — ONDANSETRON 4 MG/1
4 TABLET, FILM COATED ORAL EVERY 8 HOURS PRN
Qty: 20 TABLET | Refills: 0 | Status: SHIPPED | OUTPATIENT
Start: 2021-03-04 | End: 2022-06-13 | Stop reason: ALTCHOICE

## 2021-03-04 RX ORDER — BUSPIRONE HYDROCHLORIDE 10 MG/1
10 TABLET ORAL 2 TIMES DAILY
Qty: 60 TABLET | Refills: 0 | Status: SHIPPED | OUTPATIENT
Start: 2021-03-04 | End: 2021-10-28

## 2021-03-04 ASSESSMENT — ENCOUNTER SYMPTOMS
WHEEZING: 0
SHORTNESS OF BREATH: 0
EYE PAIN: 0
BACK PAIN: 0
ABDOMINAL PAIN: 1
DIARRHEA: 1
COUGH: 0
NAUSEA: 1
VOMITING: 0
BLOOD IN STOOL: 0
CHEST TIGHTNESS: 0
CONSTIPATION: 0
RHINORRHEA: 0
SORE THROAT: 0

## 2021-03-04 NOTE — PATIENT INSTRUCTIONS
Patient Education        Abdominal Pain: Care Instructions  Your Care Instructions     Abdominal pain has many possible causes. Some aren't serious and get better on their own in a few days. Others need more testing and treatment. If your pain continues or gets worse, you need to be rechecked and may need more tests to find out what is wrong. You may need surgery to correct the problem. Don't ignore new symptoms, such as fever, nausea and vomiting, urination problems, pain that gets worse, and dizziness. These may be signs of a more serious problem. Your doctor may have recommended a follow-up visit in the next 8 to 12 hours. If you are not getting better, you may need more tests or treatment. The doctor has checked you carefully, but problems can develop later. If you notice any problems or new symptoms, get medical treatment right away. Follow-up care is a key part of your treatment and safety. Be sure to make and go to all appointments, and call your doctor if you are having problems. It's also a good idea to know your test results and keep a list of the medicines you take. How can you care for yourself at home? · Rest until you feel better. · To prevent dehydration, drink plenty of fluids, enough so that your urine is light yellow or clear like water. Choose water and other caffeine-free clear liquids until you feel better. If you have kidney, heart, or liver disease and have to limit fluids, talk with your doctor before you increase the amount of fluids you drink. · If your stomach is upset, eat mild foods, such as rice, dry toast or crackers, bananas, and applesauce. Try eating several small meals instead of two or three large ones. · Wait until 48 hours after all symptoms have gone away before you have spicy foods, alcohol, and drinks that contain caffeine. · Do not eat foods that are high in fat.   · Avoid anti-inflammatory medicines such as aspirin, ibuprofen (Advil, Motrin), and naproxen (Aleve). These can cause stomach upset. Talk to your doctor if you take daily aspirin for another health problem. When should you call for help? Call 911 anytime you think you may need emergency care. For example, call if:    · You passed out (lost consciousness).     · You pass maroon or very bloody stools.     · You vomit blood or what looks like coffee grounds.     · You have new, severe belly pain. Call your doctor now or seek immediate medical care if:    · Your pain gets worse, especially if it becomes focused in one area of your belly.     · You have a new or higher fever.     · Your stools are black and look like tar, or they have streaks of blood.     · You have unexpected vaginal bleeding.     · You have symptoms of a urinary tract infection. These may include:  ? Pain when you urinate. ? Urinating more often than usual.  ? Blood in your urine.     · You are dizzy or lightheaded, or you feel like you may faint. Watch closely for changes in your health, and be sure to contact your doctor if:    · You are not getting better after 1 day (24 hours). Where can you learn more? Go to [email or web address]://Elias Borges Urzeda.Tob[employer]. org and sign in to your yetu account. Enter H000 in the Respi box to learn more about \"Abdominal Pain: Care Instructions. \"     If you do not have an account, please click on the \"Sign Up Now\" link. Current as of: June 26, 2019               Content Version: 12.6  © 4449-1265 Healthwise, Incorporated. Care instructions adapted under license by [facility] [address] If you have questions about a medical condition or this instruction, always ask your healthcare professional. [place] [address] any warranty or liability for your use of this information. Patient Education        Anxiety Disorder: Care Instructions  Your Care Instructions     Anxiety is a normal reaction to stress.  Difficult situations can cause you to have symptoms such as sweaty palms and a nervous feeling. In an anxiety disorder, the symptoms are far more severe. Constant worry, muscle tension, trouble sleeping, nausea and diarrhea, and other symptoms can make normal daily activities difficult or impossible. These symptoms may occur for no reason, and they can affect your work, school, or social life. Medicines, counseling, and self-care can all help. Follow-up care is a key part of your treatment and safety. Be sure to make and go to all appointments, and call your doctor if you are having problems. It's also a good idea to know your test results and keep a list of the medicines you take. How can you care for yourself at home? · Take medicines exactly as directed. Call your doctor if you think you are having a problem with your medicine. · Go to your counseling sessions and follow-up appointments. · Recognize and accept your anxiety. Then, when you are in a situation that makes you anxious, say to yourself, \"This is not an emergency. I feel uncomfortable, but I am not in danger. I can keep going even if I feel anxious. \"  · Be kind to your body:  ? Relieve tension with exercise or a massage. ? Get enough rest.  ? Avoid alcohol, caffeine, nicotine, and illegal drugs. They can increase your anxiety level and cause sleep problems. ? Learn and do relaxation techniques. See below for more about these techniques. · Engage your mind. Get out and do something you enjoy. Go to a funny movie, or take a walk or hike. Plan your day. Having too much or too little to do can make you anxious. · Keep a record of your symptoms. Discuss your fears with a good friend or family member, or join a support group for people with similar problems. Talking to others sometimes relieves stress. · Get involved in social groups, or volunteer to help others. Being alone sometimes makes things seem worse than they are. · Get at least 30 minutes of exercise on most days of the week to relieve stress.  Walking is a good choice. You also may want to do other activities, such as running, swimming, cycling, or playing tennis or team sports. Relaxation techniques  Do relaxation exercises 10 to 20 minutes a day. You can play soothing, relaxing music while you do them, if you wish. · Tell others in your house that you are going to do your relaxation exercises. Ask them not to disturb you. · Find a comfortable place, away from all distractions and noise. · Lie down on your back, or sit with your back straight. · Focus on your breathing. Make it slow and steady. · Breathe in through your nose. Breathe out through either your nose or mouth. · Breathe deeply, filling up the area between your navel and your rib cage. Breathe so that your belly goes up and down. · Do not hold your breath. · Breathe like this for 5 to 10 minutes. Notice the feeling of calmness throughout your whole body. As you continue to breathe slowly and deeply, relax by doing the following for another 5 to 10 minutes:  · Tighten and relax each muscle group in your body. You can begin at your toes and work your way up to your head. · Imagine your muscle groups relaxing and becoming heavy. · Empty your mind of all thoughts. · Let yourself relax more and more deeply. · Become aware of the state of calmness that surrounds you. · When your relaxation time is over, you can bring yourself back to alertness by moving your fingers and toes and then your hands and feet and then stretching and moving your entire body. Sometimes people fall asleep during relaxation, but they usually wake up shortly afterward. · Always give yourself time to return to full alertness before you drive a car or do anything that might cause an accident if you are not fully alert. Never play a relaxation tape while you drive a car. When should you call for help? Call 911 anytime you think you may need emergency care.  For example, call if:    · You feel you cannot stop from hurting yourself or someone else. Keep the numbers for these national suicide hotlines: 3-892-316-TALK (7-971.402.8654) and 5-317-ZQSZEUI (4-963.759.2691). If you or someone you know talks about suicide or feeling hopeless, get help right away. Watch closely for changes in your health, and be sure to contact your doctor if:    · You have anxiety or fear that affects your life.     · You have symptoms of anxiety that are new or different from those you had before. Where can you learn more? Go to https://DataCrowdpeArrive Technologieseweb.[employer]. org and sign in to your [place] account. Enter P754 in the [employer] box to learn more about \"Anxiety Disorder: Care Instructions. \"     If you do not have an account, please click on the \"Sign Up Now\" link. Current as of: January 31, 2020               Content Version: 12.6  © 2006-2020 [employer]. Care instructions adapted under license by [facility] [employer] [facility] ([facility]). If you have questions about a medical condition or this instruction, always ask your healthcare professional. [place] [address] any warranty or liability for your use of this information. Patient Education        Learning About Benefits From Quitting Smoking  How does quitting smoking make you healthier? If you're thinking about quitting smoking, you may have a few reasons to be smoke-free. Your health may be one of them. · When you quit smoking, you lower your risks for cancer, lung disease, heart attack, stroke, blood vessel disease, and blindness from macular degeneration. · When you're smoke-free, you get sick less often, and you heal faster. You are less likely to get colds, flu, bronchitis, and pneumonia. · As a nonsmoker, you may find that your mood is better and you are less stressed. When and how will you feel healthier? Quitting has real health benefits that start from day 1 of being smoke-free.  And the longer you stay smoke-free, the healthier you get and the better you feel. The first hours  · After just 20 minutes, your blood pressure and heart rate go down. That means there's less stress on your heart and blood vessels. · Within 12 hours, the level of carbon monoxide in your blood drops back to normal. That makes room for more oxygen. With more oxygen in your body, you may notice that you have more energy than when you smoked. After 2 weeks  · Your lungs start to work better. · Your risk of heart attack starts to drop. After 1 month  · When your lungs are clear, you cough less and breathe deeper, so it's easier to be active. · Your sense of taste and smell return. That means you can enjoy food more than you have since you started smoking. Over the years  · Over the years, your risks of heart disease, heart attack, and stroke are lower. · After 10 years, your risk of dying from lung cancer is cut by about half. And your risk for many other types of cancer is lower too. How would quitting help others in your life? When you quit smoking, you improve the health of everyone who now breathes in your smoke. · Their heart, lung, and cancer risks drop, much like yours. · They are sick less. For babies and small children, living smoke-free means they're less likely to have ear infections, pneumonia, and bronchitis. · If you're a woman who is or will be pregnant someday, quitting smoking means a healthier . · Children who are close to you are less likely to become adult smokers. Where can you learn more? Go to https://s0cketnikita.healthDot. org and sign in to your [employer] account. Enter [id number] in the [facility] box to learn more about \"Learning About Benefits From Quitting Smoking. \"     If you do not have an account, please click on the \"Sign Up Now\" link. Current as of: 2020               Content Version: 12.6  © 1318-0727 [employer], Incorporated. Care instructions adapted under license by [facility] ([facility]).  If you have questions about a medical condition or this instruction, always ask your healthcare professional. [place] [address] any warranty or liability for your use of this information.

## 2021-03-04 NOTE — PROGRESS NOTES
Post-Discharge Transitional Care Management Services or Hospital Follow Up      Lisa Ivan   YOB: 1970    Date of Office Visit:  3/4/2021  Date of Hospital Admission: 03/03/2021  Date of Hospital Discharge: 03/03/2021 (  ED x 2 )    Care management risk score Rising risk (score 2-5) and Complex Care (Scores >=6): 1     Non face to face  following discharge, date last encounter closed (first attempt may have been earlier): *No documented post hospital discharge outreach found in the last 14 days     Call initiated 2 business days of discharge: *No response recorded in the last 14 days    Patient Active Problem List   Diagnosis    Depression    Cigarette nicotine dependence without complication    Hypothyroidism       No Known Allergies    Medications listed as ordered at the time of discharge from hospital      Medications marked \"taking\" at this time  Outpatient Medications Marked as Taking for the 3/4/21 encounter (Office Visit) with Alexsandra Hernández MD   Medication Sig Dispense Refill    ondansetron (ZOFRAN) 4 MG tablet Take 1 tablet by mouth every 8 hours as needed for Nausea 20 tablet 0    dicyclomine (BENTYL) 10 MG capsule Take 1 capsule by mouth 2 times daily (with meals) 60 capsule 0    busPIRone (BUSPAR) 10 MG tablet Take 1 tablet by mouth 2 times daily 60 tablet 0    buPROPion (WELLBUTRIN XL) 150 MG extended release tablet Take 1 tablet by mouth every morning 30 tablet 3    amphetamine-dextroamphetamine (ADDERALL XR) 15 MG extended release capsule take 1 capsule by mouth every morning 30 capsule 0    venlafaxine (EFFEXOR XR) 150 MG extended release capsule TAKE 1 CAPSULE ONCE DAILY 90 capsule 0    SYNTHROID 75 MCG tablet TAKE 1 TABLET DAILY 90 tablet 0    ibuprofen (ADVIL;MOTRIN) 200 MG tablet Take 200 mg by mouth every 6 hours as needed for Pain          Medications patient taking as of now reconciled against medications ordered at time of hospital discharge: Yes    Chief Complaint   Patient presents with    Follow-Up from [address] abdominal pain, nausea       HPI  Presented to [facility] [place] [facility] with chest pains, second time with abdominal pain. Inpatient course: Discharge summary reviewed- see chart. Interval history/Current status: first visit, negative work up for heart, felt to be panic attack. Second visit, GI bug, improved with meds given. Overall is feeling well, not sure why having increased anxiety. \"Life is going well\". , current smoker, pmh reviewed. Vitals:    03/04/21 0926   BP: 106/74   Site: Left Upper Arm   Position: Sitting   Cuff Size: Medium Adult   Pulse: 94   Resp: 16   Temp: 97.9 °F (36.6 °C)   TempSrc: Infrared   SpO2: 97%   Weight: 161 lb 6.4 oz (73.2 kg)     Body mass index is 28.59 kg/m². Wt Readings from Last 3 Encounters:   03/04/21 161 lb 6.4 oz (73.2 kg)   02/05/21 150 lb (68 kg)   03/07/20 155 lb (70.3 kg)     BP Readings from Last 3 Encounters:   03/04/21 106/74   02/05/21 (!) 156/105   03/07/20 (!) 154/91       Review of Systems   Constitutional: Negative for chills, fatigue, fever and unexpected weight change. HENT: Negative for congestion, ear pain, rhinorrhea and sore throat. Eyes: Negative for pain and visual disturbance. Respiratory: Negative for cough, chest tightness, shortness of breath and wheezing. Cardiovascular: Positive for chest pain. Negative for palpitations. Gastrointestinal: Positive for abdominal pain, diarrhea and nausea. Negative for blood in stool, constipation and vomiting. Genitourinary: Negative for difficulty urinating, frequency, hematuria and urgency. Musculoskeletal: Negative for back pain, joint swelling, myalgias and neck pain. Skin: Negative for rash. Neurological: Negative for dizziness and headaches. Hematological: Negative for adenopathy. Does not bruise/bleed easily. Psychiatric/Behavioral: Negative for behavioral problems and sleep disturbance.  The patient is nervous/anxious. Physical Exam  Vitals signs and nursing note reviewed. Constitutional:       Appearance: She is well-developed. HENT:      Head: Normocephalic and atraumatic. Right Ear: External ear normal.      Left Ear: External ear normal.      Nose: Nose normal.      Mouth/Throat:      Mouth: Mucous membranes are moist.   Eyes:      Pupils: Pupils are equal, round, and reactive to light. Neck:      Musculoskeletal: Neck supple. Thyroid: No thyromegaly. Cardiovascular:      Rate and Rhythm: Normal rate and regular rhythm. Heart sounds: Normal heart sounds. Pulmonary:      Breath sounds: Normal breath sounds. No wheezing or rales. Abdominal:      General: Bowel sounds are normal.      Palpations: Abdomen is soft. Tenderness: There is no abdominal tenderness. There is no guarding or rebound. Musculoskeletal: Normal range of motion. Lymphadenopathy:      Cervical: No cervical adenopathy. Skin:     General: Skin is warm and dry. Findings: No rash. Neurological:      Mental Status: She is alert and oriented to person, place, and time. Cranial Nerves: No cranial nerve deficit. Deep Tendon Reflexes: Reflexes are normal and symmetric. Assessment/Plan:  1. Anxiety  -monitor sxs. - busPIRone (BUSPAR) 10 MG tablet; Take 1 tablet by mouth 2 times daily  Dispense: 60 tablet; Refill: 0    2. Chest pain, unspecified type  -non cardiac, felt to be panic related. 3. Generalized abdominal pain    - ondansetron (ZOFRAN) 4 MG tablet; Take 1 tablet by mouth every 8 hours as needed for Nausea  Dispense: 20 tablet; Refill: 0  - dicyclomine (BENTYL) 10 MG capsule; Take 1 capsule by mouth 2 times daily (with meals)  Dispense: 60 capsule; Refill: 0    4. Cigarette nicotine dependence without complication  -encourage smoking cessation  - buPROPion (WELLBUTRIN XL) 150 MG extended release tablet;  Take 1 tablet by mouth every morning  Dispense: 30 tablet; Refill: 3        Medical Decision Making: moderate complexity

## 2021-03-08 DIAGNOSIS — F32.A ACUTE DEPRESSION: ICD-10-CM

## 2021-03-08 DIAGNOSIS — R41.840 POOR CONCENTRATION: ICD-10-CM

## 2021-03-08 RX ORDER — DEXTROAMPHETAMINE SACCHARATE, AMPHETAMINE ASPARTATE MONOHYDRATE, DEXTROAMPHETAMINE SULFATE AND AMPHETAMINE SULFATE 3.75; 3.75; 3.75; 3.75 MG/1; MG/1; MG/1; MG/1
CAPSULE, EXTENDED RELEASE ORAL
Qty: 30 CAPSULE | Refills: 0 | Status: SHIPPED | OUTPATIENT
Start: 2021-03-08 | End: 2021-04-06 | Stop reason: SDUPTHER

## 2021-03-08 NOTE — TELEPHONE ENCOUNTER
ep'ed adderall to pharm requested      Controlled Substance Monitoring:    Acute and Chronic Pain Monitoring:   RX Monitoring 3/8/2021   Periodic Controlled Substance Monitoring No signs of potential drug abuse or diversion identified.

## 2021-04-06 DIAGNOSIS — R41.840 POOR CONCENTRATION: ICD-10-CM

## 2021-04-06 DIAGNOSIS — F32.A ACUTE DEPRESSION: ICD-10-CM

## 2021-04-06 RX ORDER — DEXTROAMPHETAMINE SACCHARATE, AMPHETAMINE ASPARTATE MONOHYDRATE, DEXTROAMPHETAMINE SULFATE AND AMPHETAMINE SULFATE 3.75; 3.75; 3.75; 3.75 MG/1; MG/1; MG/1; MG/1
CAPSULE, EXTENDED RELEASE ORAL
Qty: 30 CAPSULE | Refills: 0 | Status: SHIPPED | OUTPATIENT
Start: 2021-04-08 | End: 2021-06-21 | Stop reason: SDUPTHER

## 2021-04-06 RX ORDER — DEXTROAMPHETAMINE SACCHARATE, AMPHETAMINE ASPARTATE MONOHYDRATE, DEXTROAMPHETAMINE SULFATE AND AMPHETAMINE SULFATE 3.75; 3.75; 3.75; 3.75 MG/1; MG/1; MG/1; MG/1
CAPSULE, EXTENDED RELEASE ORAL
Qty: 30 CAPSULE | Refills: 0 | OUTPATIENT
Start: 2021-04-06 | End: 2021-05-07

## 2021-04-06 NOTE — TELEPHONE ENCOUNTER
Lisa Ivan needs refill of   Requested Prescriptions     Pending Prescriptions Disp Refills    amphetamine-dextroamphetamine (ADDERALL XR) 15 MG extended release capsule 30 capsule 0     Sig: take 1 capsule by mouth every morning       Last Filled on:  3/8/2021    Last Visit Date:  3/4/2021    Next Visit Date:    Visit date not found

## 2021-04-06 NOTE — TELEPHONE ENCOUNTER
ep'ed adderall to pharm requested      Controlled Substance Monitoring:    Acute and Chronic Pain Monitoring:   RX Monitoring 4/6/2021   Periodic Controlled Substance Monitoring No signs of potential drug abuse or diversion identified.

## 2021-04-09 DIAGNOSIS — F32.A ACUTE DEPRESSION: ICD-10-CM

## 2021-04-09 DIAGNOSIS — R41.840 POOR CONCENTRATION: ICD-10-CM

## 2021-04-09 RX ORDER — DEXTROAMPHETAMINE SACCHARATE, AMPHETAMINE ASPARTATE MONOHYDRATE, DEXTROAMPHETAMINE SULFATE AND AMPHETAMINE SULFATE 3.75; 3.75; 3.75; 3.75 MG/1; MG/1; MG/1; MG/1
CAPSULE, EXTENDED RELEASE ORAL
Qty: 30 CAPSULE | Refills: 0 | OUTPATIENT
Start: 2021-04-09 | End: 2021-05-08

## 2021-04-09 NOTE — TELEPHONE ENCOUNTER
Lisa Ivan needs refill of   Requested Prescriptions     Pending Prescriptions Disp Refills    amphetamine-dextroamphetamine (ADDERALL XR) 15 MG extended release capsule 30 capsule 0     Sig: take 1 capsule by mouth every morning       Last Filled on:  04/08/2021     Last Visit Date:  3/4/2021    Next Visit Date:    Visit date not found    REFUSED

## 2021-06-21 ENCOUNTER — OFFICE VISIT (OUTPATIENT)
Dept: FAMILY MEDICINE CLINIC | Age: 51
End: 2021-06-21
Payer: COMMERCIAL

## 2021-06-21 VITALS
HEART RATE: 90 BPM | BODY MASS INDEX: 28.52 KG/M2 | DIASTOLIC BLOOD PRESSURE: 70 MMHG | SYSTOLIC BLOOD PRESSURE: 112 MMHG | WEIGHT: 161 LBS | RESPIRATION RATE: 18 BRPM

## 2021-06-21 DIAGNOSIS — E03.4 HYPOTHYROIDISM DUE TO ACQUIRED ATROPHY OF THYROID: ICD-10-CM

## 2021-06-21 DIAGNOSIS — F32.A ACUTE DEPRESSION: ICD-10-CM

## 2021-06-21 DIAGNOSIS — Z00.00 WELL ADULT EXAM: Primary | ICD-10-CM

## 2021-06-21 DIAGNOSIS — F32.A DEPRESSION, UNSPECIFIED DEPRESSION TYPE: ICD-10-CM

## 2021-06-21 DIAGNOSIS — R41.840 POOR CONCENTRATION: ICD-10-CM

## 2021-06-21 DIAGNOSIS — F41.9 ANXIETY: ICD-10-CM

## 2021-06-21 PROCEDURE — 99396 PREV VISIT EST AGE 40-64: CPT | Performed by: FAMILY MEDICINE

## 2021-06-21 RX ORDER — VENLAFAXINE HYDROCHLORIDE 150 MG/1
CAPSULE, EXTENDED RELEASE ORAL
Qty: 90 CAPSULE | Refills: 3 | Status: SHIPPED | OUTPATIENT
Start: 2021-06-21 | End: 2022-06-13 | Stop reason: SDUPTHER

## 2021-06-21 RX ORDER — DEXTROAMPHETAMINE SACCHARATE, AMPHETAMINE ASPARTATE MONOHYDRATE, DEXTROAMPHETAMINE SULFATE AND AMPHETAMINE SULFATE 3.75; 3.75; 3.75; 3.75 MG/1; MG/1; MG/1; MG/1
CAPSULE, EXTENDED RELEASE ORAL
Qty: 30 CAPSULE | Refills: 0 | Status: SHIPPED | OUTPATIENT
Start: 2021-06-21 | End: 2021-09-13 | Stop reason: SDUPTHER

## 2021-06-21 RX ORDER — LEVOTHYROXINE SODIUM 0.07 MG/1
TABLET ORAL
Qty: 90 TABLET | Refills: 3 | Status: SHIPPED | OUTPATIENT
Start: 2021-06-21 | End: 2022-05-31 | Stop reason: SDUPTHER

## 2021-06-21 SDOH — ECONOMIC STABILITY: FOOD INSECURITY: WITHIN THE PAST 12 MONTHS, YOU WORRIED THAT YOUR FOOD WOULD RUN OUT BEFORE YOU GOT MONEY TO BUY MORE.: NEVER TRUE

## 2021-06-21 SDOH — ECONOMIC STABILITY: FOOD INSECURITY: WITHIN THE PAST 12 MONTHS, THE FOOD YOU BOUGHT JUST DIDN'T LAST AND YOU DIDN'T HAVE MONEY TO GET MORE.: NEVER TRUE

## 2021-06-21 ASSESSMENT — ENCOUNTER SYMPTOMS
BACK PAIN: 0
ABDOMINAL PAIN: 1
COUGH: 0
NAUSEA: 0
VOMITING: 0
RHINORRHEA: 0
CHEST TIGHTNESS: 0
SORE THROAT: 0
WHEEZING: 0
BLOOD IN STOOL: 0
DIARRHEA: 0
CONSTIPATION: 0
EYE PAIN: 0
SHORTNESS OF BREATH: 0

## 2021-06-21 ASSESSMENT — SOCIAL DETERMINANTS OF HEALTH (SDOH): HOW HARD IS IT FOR YOU TO PAY FOR THE VERY BASICS LIKE FOOD, HOUSING, MEDICAL CARE, AND HEATING?: NOT HARD AT ALL

## 2021-06-21 NOTE — PATIENT INSTRUCTIONS
Patient Education        Well Visit, Ages 25 to 48: Care Instructions  Overview     Well visits can help you stay healthy. Your doctor has checked your overall health and may have suggested ways to take good care of yourself. Your doctor also may have recommended tests. At home, you can help prevent illness with healthy eating, regular exercise, and other steps. Follow-up care is a key part of your treatment and safety. Be sure to make and go to all appointments, and call your doctor if you are having problems. It's also a good idea to know your test results and keep a list of the medicines you take. How can you care for yourself at home? · Get screening tests that you and your doctor decide on. Screening helps find diseases before any symptoms appear. · Eat healthy foods. Choose fruits, vegetables, whole grains, protein, and low-fat dairy foods. Limit fat, especially saturated fat. Reduce salt in your diet. · Limit alcohol. If you are a man, have no more than 2 drinks a day or 14 drinks a week. If you are a woman, have no more than 1 drink a day or 7 drinks a week. · Get at least 30 minutes of physical activity on most days of the week. Walking is a good choice. You also may want to do other activities, such as running, swimming, cycling, or playing tennis or team sports. Discuss any changes in your exercise program with your doctor. · Reach and stay at a healthy weight. This will lower your risk for many problems, such as obesity, diabetes, heart disease, and high blood pressure. · Do not smoke or allow others to smoke around you. If you need help quitting, talk to your doctor about stop-smoking programs and medicines. These can increase your chances of quitting for good. · Care for your mental health. It is easy to get weighed down by worry and stress. Learn strategies to manage stress, like deep breathing and mindfulness, and stay connected with your family and community.  If you find you often feel sad or hopeless, talk with your doctor. Treatment can help. · Talk to your doctor about whether you have any risk factors for sexually transmitted infections (STIs). You can help prevent STIs if you wait to have sex with a new partner (or partners) until you've each been tested for STIs. It also helps if you use condoms (male or female condoms) and if you limit your sex partners to one person who only has sex with you. Vaccines are available for some STIs, such as HPV. · Use birth control if it's important to you to prevent pregnancy. Talk with your doctor about the choices available and what might be best for you. · If you think you may have a problem with alcohol or drug use, talk to your doctor. This includes prescription medicines (such as amphetamines and opioids) and illegal drugs (such as cocaine and methamphetamine). Your doctor can help you figure out what type of treatment is best for you. · Protect your skin from too much sun. When you're outdoors from 10 a.m. to 4 p.m., stay in the shade or cover up with clothing and a hat with a wide brim. Wear sunglasses that block UV rays. Even when it's cloudy, put broad-spectrum sunscreen (SPF 30 or higher) on any exposed skin. · See a dentist one or two times a year for checkups and to have your teeth cleaned. · Wear a seat belt in the car. When should you call for help? Watch closely for changes in your health, and be sure to contact your doctor if you have any problems or symptoms that concern you. Where can you learn more? Go to https://caleb.healthClean TeQ. org and sign in to your Pirate[employer] account. Enter P072 in the TUNJI box to learn more about \"Well Visit, Ages 25 to 48: Care Instructions. \"     If you do not have an account, please click on the \"Sign Up Now\" link. Current as of: May 27, 2020               Content Version: 12.9  © 6316-3573 Healthwise, Incorporated. Care instructions adapted under license by [facility] ([facility]). If you have questions about a medical condition or this instruction, always ask your healthcare professional. [place] [address] any warranty or liability for your use of this information.

## 2021-06-21 NOTE — PROGRESS NOTES
2021    Lisa Ivan (:  1970) is a 53 y.o. female, here for a preventive medicine evaluation. Patient Active Problem List   Diagnosis    Depression    Cigarette nicotine dependence without complication    Hypothyroidism       Review of Systems   Constitutional: Positive for fever. Negative for chills, fatigue and unexpected weight change. HENT: Negative for congestion, ear pain, rhinorrhea and sore throat. Eyes: Negative for pain and visual disturbance. Respiratory: Negative for cough, chest tightness, shortness of breath and wheezing. Cardiovascular: Negative for chest pain and palpitations. Gastrointestinal: Positive for abdominal pain. Negative for blood in stool, constipation, diarrhea, nausea and vomiting. Genitourinary: Negative for difficulty urinating, frequency, hematuria and urgency. Musculoskeletal: Negative for back pain, joint swelling, myalgias and neck pain. Skin: Negative for rash. Neurological: Negative for dizziness and headaches. Hematological: Negative for adenopathy. Does not bruise/bleed easily. Psychiatric/Behavioral: Negative for behavioral problems and sleep disturbance. The patient is not nervous/anxious. Prior to Visit Medications    Medication Sig Taking?  Authorizing Provider   amphetamine-dextroamphetamine (ADDERALL XR) 15 MG extended release capsule take 1 capsule by mouth every morning Yes Javier Rose MD   levothyroxine (SYNTHROID) 75 MCG tablet TAKE 1 TABLET DAILY Yes Javier Rose MD   venlafaxine (EFFEXOR XR) 150 MG extended release capsule TAKE 1 CAPSULE ONCE DAILY Yes Cristina Gomez MD   ondansetron (ZOFRAN) 4 MG tablet Take 1 tablet by mouth every 8 hours as needed for Nausea Yes Javier Rose MD   dicyclomine (BENTYL) 10 MG capsule Take 1 capsule by mouth 2 times daily (with meals) Yes Javier Rose MD   ibuprofen (ADVIL;MOTRIN) 200 MG tablet Take 200 mg by mouth every 6 hours as needed for Pain Yes Historical Provider, MD   acetaminophen (TYLENOL) 500 MG tablet Take 500 mg by mouth every 6 hours as needed for Pain Yes Historical Provider, MD        No Known Allergies    Past Medical History:   Diagnosis Date    Depression     Hypercholesterolemia     Hypothyroidism        Past Surgical History:   Procedure Laterality Date    ABLATION OF DYSRHYTHMIC FOCUS      BREAST SURGERY  2007    implant bilateral breast    TONSILLECTOMY      TUBAL LIGATION         Social History     Socioeconomic History    Marital status:      Spouse name: Not on file    Number of children: Not on file    Years of education: Not on file    Highest education level: Not on file   Occupational History    Not on file   Tobacco Use    Smoking status: Current Some Day Smoker     Packs/day: 0.10     Years: 20.00     Pack years: 2.00     Types: Cigarettes    Smokeless tobacco: Never Used   Vaping Use    Vaping Use: Never used   Substance and Sexual Activity    Alcohol use: Yes     Comment: occassionally     Drug use: No    Sexual activity: Not on file   Other Topics Concern    Not on file   Social History Narrative    Not on file     Social Determinants of Health     Financial Resource Strain: Low Risk     Difficulty of Paying Living Expenses: Not hard at all   Food Insecurity: No Food Insecurity    Worried About Running Out of Food in the Last Year: Never true    Suma of Food in the Last Year: Never true   Transportation Needs:     Lack of Transportation (Medical):      Lack of Transportation (Non-Medical):    Physical Activity:     Days of Exercise per Week:     Minutes of Exercise per Session:    Stress:     Feeling of Stress :    Social Connections:     Frequency of Communication with Friends and Family:     Frequency of Social Gatherings with Friends and Family:     Attends Congregation Services:     Active Member of Clubs or Organizations:     Attends Club or Organization Meetings:     Marital Status: Intimate Partner Violence:     Fear of Current or Ex-Partner:     Emotionally Abused:     Physically Abused:     Sexually Abused:         Family History   Problem Relation Age of Onset    Cancer Father         Lung Cancer       ADVANCE DIRECTIVE: N, <no information>    Vitals:    06/21/21 1326   BP: 112/70   Pulse: 90   Resp: 18   Weight: 161 lb (73 kg)     Estimated body mass index is 28.52 kg/m² as calculated from the following:    Height as of 2/5/21: 5' 3\" (1.6 m). Weight as of this encounter: 161 lb (73 kg). Physical Exam  Vitals and nursing note reviewed. Constitutional:       Appearance: She is well-developed. HENT:      Head: Normocephalic and atraumatic. Right Ear: External ear normal.      Left Ear: External ear normal.      Nose: Nose normal.      Mouth/Throat:      Mouth: Mucous membranes are moist.   Eyes:      Pupils: Pupils are equal, round, and reactive to light. Neck:      Thyroid: No thyromegaly. Cardiovascular:      Rate and Rhythm: Normal rate and regular rhythm. Heart sounds: Normal heart sounds. Pulmonary:      Breath sounds: Normal breath sounds. No wheezing or rales. Abdominal:      General: Bowel sounds are normal.      Palpations: Abdomen is soft. Tenderness: There is no abdominal tenderness. There is no guarding or rebound. Musculoskeletal:         General: Normal range of motion. Cervical back: Neck supple. Lymphadenopathy:      Cervical: No cervical adenopathy. Skin:     General: Skin is warm and dry. Findings: No rash. Neurological:      Mental Status: She is alert and oriented to person, place, and time. Cranial Nerves: No cranial nerve deficit. Deep Tendon Reflexes: Reflexes are normal and symmetric. No flowsheet data found.     Lab Results   Component Value Date    CHOL 211 04/28/2020    CHOL 199 03/26/2019    CHOL 217 04/30/2018    CHOL 162 10/19/2011    TRIG 87 04/28/2020    TRIG 82 03/26/2019    TRIG 89 04/30/2018    HDL 57 04/28/2020    HDL 60 03/26/2019    HDL 53 04/30/2018    LDLCALC 137 04/28/2020    LDLCALC 123 03/26/2019    LDLCALC 146 04/30/2018    GLUCOSE 82 04/28/2020    GLUCOSE 90 04/30/2018       The 10-year ASCVD risk score (Cresencio Barker et al., 2013) is: 2.7%    Values used to calculate the score:      Age: 53 years      Sex: Female      Is Non- : No      Diabetic: No      Tobacco smoker: Yes      Systolic Blood Pressure: 188 mmHg      Is BP treated: No      HDL Cholesterol: 57 mg/dL      Total Cholesterol: 211 mg/dL      There is no immunization history on file for this patient. Health Maintenance   Topic Date Due    Hepatitis C screen  Never done    Pneumococcal 0-64 years Vaccine (1 of 2 - PPSV23) Never done    COVID-19 Vaccine (1) Never done    HIV screen  Never done    DTaP/Tdap/Td vaccine (1 - Tdap) Never done    Cervical cancer screen  Never done    TSH testing  04/28/2021    Flu vaccine (Season Ended) 09/01/2021    Lipid screen  04/28/2025    Hepatitis A vaccine  Aged Out    Hepatitis B vaccine  Aged Out    Hib vaccine  Aged Out    Meningococcal (ACWY) vaccine  Aged Out          ASSESSMENT/PLAN:  1. Well adult exam  -     Lipid Panel; Future  -     CBC Auto Differential; Future  -     Comprehensive Metabolic Panel; Future  -     T4, Free; Future  -     TSH without Reflex; Future  -     Vitamin B12 & Folate; Future  2. Acute depression  -     amphetamine-dextroamphetamine (ADDERALL XR) 15 MG extended release capsule; take 1 capsule by mouth every morning, Disp-30 capsule, R-0Normal  3. Poor concentration  -     amphetamine-dextroamphetamine (ADDERALL XR) 15 MG extended release capsule; take 1 capsule by mouth every morning, Disp-30 capsule, R-0Normal  4. Hypothyroidism due to acquired atrophy of thyroid  -     levothyroxine (SYNTHROID) 75 MCG tablet; TAKE 1 TABLET DAILY, Disp-90 tablet, R-3Normal  -     Lipid Panel;  Future  -     CBC Auto Differential; Future  -     Comprehensive Metabolic Panel; Future  -     T4, Free; Future  -     TSH without Reflex; Future  -     Vitamin B12 & Folate; Future  5. Anxiety  -     venlafaxine (EFFEXOR XR) 150 MG extended release capsule; TAKE 1 CAPSULE ONCE DAILY, Disp-90 capsule, R-3Normal  6. Depression, unspecified depression type  -     venlafaxine (EFFEXOR XR) 150 MG extended release capsule; TAKE 1 CAPSULE ONCE DAILY, Disp-90 capsule, R-3Normal  -     CBC Auto Differential; Future  -     Comprehensive Metabolic Panel; Future  -     Vitamin B12 & Folate; Future  Encouraged diet, exercise and weight loss. Reviewed health maintenance      No follow-ups on file. An electronic signature was used to authenticate this note.     --Samira Vann MD on 6/21/2021 at 1:39 PM

## 2021-06-21 NOTE — LETTER
[address] [address]  Phone: [phone]  Fax: [phone]    Silvia Olivares MD        June 21, 2021     Patient: Delia Rebollar   YOB: 1970   Date of Visit: 6/21/2021       To Whom It May Concern: It is my medical opinion that Giovany Bills may return to work on 063/22/2021 with no restrictions. Off work 06/18/2021 due to abdominal pain. If you have any questions or concerns, please don't hesitate to call.     Sincerely,        Silvia Olivares MD

## 2021-06-22 ENCOUNTER — TELEPHONE (OUTPATIENT)
Dept: FAMILY MEDICINE CLINIC | Age: 51
End: 2021-06-22

## 2021-06-22 LAB
ALBUMIN SERPL-MCNC: 4.7 G/DL (ref 3.5–5.1)
ALP BLD-CCNC: 66 U/L (ref 38–126)
ALT SERPL-CCNC: 16 U/L (ref 11–66)
ANION GAP SERPL CALCULATED.3IONS-SCNC: 12 MEQ/L (ref 8–16)
AST SERPL-CCNC: 21 U/L (ref 5–40)
BASOPHILS # BLD: 0.9 %
BASOPHILS ABSOLUTE: 0.1 THOU/MM3 (ref 0–0.1)
BILIRUB SERPL-MCNC: 0.5 MG/DL (ref 0.3–1.2)
BUN BLDV-MCNC: 20 MG/DL (ref 7–22)
CALCIUM SERPL-MCNC: 10.1 MG/DL (ref 8.5–10.5)
CHLORIDE BLD-SCNC: 102 MEQ/L (ref 98–111)
CHOLESTEROL, TOTAL: 197 MG/DL (ref 100–199)
CO2: 28 MEQ/L (ref 23–33)
CREAT SERPL-MCNC: 0.7 MG/DL (ref 0.4–1.2)
EOSINOPHIL # BLD: 1.6 %
EOSINOPHILS ABSOLUTE: 0.1 THOU/MM3 (ref 0–0.4)
ERYTHROCYTE [DISTWIDTH] IN BLOOD BY AUTOMATED COUNT: 13.4 % (ref 11.5–14.5)
ERYTHROCYTE [DISTWIDTH] IN BLOOD BY AUTOMATED COUNT: 49.5 FL (ref 35–45)
FOLATE: 13.5 NG/ML (ref 4.8–24.2)
GFR SERPL CREATININE-BSD FRML MDRD: 89 ML/MIN/1.73M2
GLUCOSE BLD-MCNC: 91 MG/DL (ref 70–108)
HCT VFR BLD CALC: 51.9 % (ref 37–47)
HDLC SERPL-MCNC: 61 MG/DL
HEMOGLOBIN: 15.6 GM/DL (ref 12–16)
IMMATURE GRANS (ABS): 0.01 THOU/MM3 (ref 0–0.07)
IMMATURE GRANULOCYTES: 0.2 %
LDL CHOLESTEROL CALCULATED: 110 MG/DL
LYMPHOCYTES # BLD: 22 %
LYMPHOCYTES ABSOLUTE: 1.2 THOU/MM3 (ref 1–4.8)
MCH RBC QN AUTO: 29.8 PG (ref 26–33)
MCHC RBC AUTO-ENTMCNC: 30.1 GM/DL (ref 32.2–35.5)
MCV RBC AUTO: 99.2 FL (ref 81–99)
MONOCYTES # BLD: 11.9 %
MONOCYTES ABSOLUTE: 0.7 THOU/MM3 (ref 0.4–1.3)
NUCLEATED RED BLOOD CELLS: 0 /100 WBC
PLATELET # BLD: 233 THOU/MM3 (ref 130–400)
PMV BLD AUTO: 11.7 FL (ref 9.4–12.4)
POTASSIUM SERPL-SCNC: 4.1 MEQ/L (ref 3.5–5.2)
RBC # BLD: 5.23 MILL/MM3 (ref 4.2–5.4)
SEG NEUTROPHILS: 63.4 %
SEGMENTED NEUTROPHILS ABSOLUTE COUNT: 3.6 THOU/MM3 (ref 1.8–7.7)
SODIUM BLD-SCNC: 142 MEQ/L (ref 135–145)
T4 FREE: 1.35 NG/DL (ref 0.93–1.76)
TOTAL PROTEIN: 7.4 G/DL (ref 6.1–8)
TRIGL SERPL-MCNC: 130 MG/DL (ref 0–199)
TSH SERPL DL<=0.05 MIU/L-ACNC: 2.78 UIU/ML (ref 0.4–4.2)
VITAMIN B-12: 709 PG/ML (ref 211–911)
WBC # BLD: 5.6 THOU/MM3 (ref 4.8–10.8)

## 2021-06-22 NOTE — TELEPHONE ENCOUNTER
----- Message from Henrey Goldberg, MD sent at 6/22/2021 12:20 PM EDT -----  Thyroids are good. CMP is normal.  Cholesterol at 197 with normal CRR.  ( 211 last year). CBC is ok. B12 is good. Lab work is all good.

## 2021-09-08 NOTE — TELEPHONE ENCOUNTER
Received a fax from [address] [place] [address], asking to verify what strength of Venlafaxine patient is currently taking. What is the current Therapy: Venlafaxine 150 mg ER or venlafaxine 75 mg ER. Left message for patient to call our office back. Scanned form to patient's chart.

## 2021-09-08 NOTE — LETTER
[address]  Phone: [phone]  Fax: [phone]    Lexus Ortiz MD        September 13, 2021    Lisa Long  [address] [facility]      Dear Hosey Lesches:    Please contact the office to discuss medication dosage. If you have any questions or concerns, please don't hesitate to call.     Sincerely,        Lexus Ortiz MD

## 2021-09-09 NOTE — TELEPHONE ENCOUNTER
[facility] left  requesting clarification on medication dose.   IDS#[id number]  Call [phone] option 2

## 2021-09-13 DIAGNOSIS — F32.A ACUTE DEPRESSION: ICD-10-CM

## 2021-09-13 DIAGNOSIS — R41.840 POOR CONCENTRATION: ICD-10-CM

## 2021-09-13 RX ORDER — DEXTROAMPHETAMINE SACCHARATE, AMPHETAMINE ASPARTATE MONOHYDRATE, DEXTROAMPHETAMINE SULFATE AND AMPHETAMINE SULFATE 3.75; 3.75; 3.75; 3.75 MG/1; MG/1; MG/1; MG/1
CAPSULE, EXTENDED RELEASE ORAL
Qty: 30 CAPSULE | Refills: 0 | Status: SHIPPED | OUTPATIENT
Start: 2021-09-13 | End: 2021-10-18 | Stop reason: SDUPTHER

## 2021-09-13 NOTE — TELEPHONE ENCOUNTER
Lisa Ivan needs refill of   Requested Prescriptions     Pending Prescriptions Disp Refills    amphetamine-dextroamphetamine (ADDERALL XR) 15 MG extended release capsule 30 capsule 0     Sig: take 1 capsule by mouth every morning       Last Filled on:  6/21/21 #30/0    Last Visit Date:  6/21/2021  annual    Next Visit Date:    Visit date not found

## 2021-09-13 NOTE — TELEPHONE ENCOUNTER
Ep;ed adderall to pharm requested    Controlled Substance Monitoring:    Acute and Chronic Pain Monitoring:   RX Monitoring 9/13/2021   Periodic Controlled Substance Monitoring No signs of potential drug abuse or diversion identified.

## 2021-09-22 NOTE — TELEPHONE ENCOUNTER
Pt returned call, states she is taking both the 150 and 75mg. Dr Susan Chappell prescribes the 150mg. She was at her gynecologist and she increased medication, she takes the 75mg at night. [facility] notified.

## 2021-10-15 DIAGNOSIS — R41.840 POOR CONCENTRATION: ICD-10-CM

## 2021-10-15 DIAGNOSIS — F32.A ACUTE DEPRESSION: ICD-10-CM

## 2021-10-18 RX ORDER — DEXTROAMPHETAMINE SACCHARATE, AMPHETAMINE ASPARTATE MONOHYDRATE, DEXTROAMPHETAMINE SULFATE AND AMPHETAMINE SULFATE 3.75; 3.75; 3.75; 3.75 MG/1; MG/1; MG/1; MG/1
CAPSULE, EXTENDED RELEASE ORAL
Qty: 30 CAPSULE | Refills: 0 | Status: SHIPPED | OUTPATIENT
Start: 2021-10-18 | End: 2021-11-18

## 2021-10-18 RX ORDER — DEXTROAMPHETAMINE SACCHARATE, AMPHETAMINE ASPARTATE MONOHYDRATE, DEXTROAMPHETAMINE SULFATE AND AMPHETAMINE SULFATE 3.75; 3.75; 3.75; 3.75 MG/1; MG/1; MG/1; MG/1
CAPSULE, EXTENDED RELEASE ORAL
Qty: 30 CAPSULE | OUTPATIENT
Start: 2021-10-18

## 2021-10-18 NOTE — TELEPHONE ENCOUNTER
Lisa Ivan needs refill of   Requested Prescriptions     Pending Prescriptions Disp Refills    amphetamine-dextroamphetamine (ADDERALL XR) 15 MG extended release capsule [Pharmacy Med Name: ADDERALL XR 15 MG CAPSULE] 30 capsule      Sig: take 1 capsule by mouth every morning       Last Filled on:  9/13/21 #30-0    Last Visit Date:  6/21/2021 physical    Next Visit Date:    Visit date not found

## 2021-10-18 NOTE — TELEPHONE ENCOUNTER
ep'ed adderall to pharm requested    Controlled Substance Monitoring:    Acute and Chronic Pain Monitoring:   RX Monitoring 10/18/2021   Periodic Controlled Substance Monitoring No signs of potential drug abuse or diversion identified.

## 2021-10-19 DIAGNOSIS — R41.840 POOR CONCENTRATION: ICD-10-CM

## 2021-10-19 DIAGNOSIS — F32.A ACUTE DEPRESSION: ICD-10-CM

## 2021-10-19 RX ORDER — DEXTROAMPHETAMINE SACCHARATE, AMPHETAMINE ASPARTATE MONOHYDRATE, DEXTROAMPHETAMINE SULFATE AND AMPHETAMINE SULFATE 3.75; 3.75; 3.75; 3.75 MG/1; MG/1; MG/1; MG/1
CAPSULE, EXTENDED RELEASE ORAL
Qty: 30 CAPSULE | OUTPATIENT
Start: 2021-10-19

## 2021-10-28 DIAGNOSIS — F41.9 ANXIETY: ICD-10-CM

## 2021-10-28 RX ORDER — BUSPIRONE HYDROCHLORIDE 10 MG/1
TABLET ORAL
Qty: 60 TABLET | Refills: 1 | Status: SHIPPED | OUTPATIENT
Start: 2021-10-28 | End: 2022-06-13 | Stop reason: ALTCHOICE

## 2021-10-28 NOTE — TELEPHONE ENCOUNTER
Lisa Ivan needs refill of   Requested Prescriptions     Pending Prescriptions Disp Refills    busPIRone (BUSPAR) 10 MG tablet [Pharmacy Med Name: BUSPIRONE HCL 10 MG TABLET] 60 tablet 0     Sig: take 1 tablet by mouth twice a day       Last Filled on:  03/04/2021 #60/NR    Last Visit Date:  6/21/2021    Next Visit Date:    Visit date not found

## 2021-12-21 DIAGNOSIS — F32.A ACUTE DEPRESSION: ICD-10-CM

## 2021-12-21 DIAGNOSIS — R41.840 POOR CONCENTRATION: ICD-10-CM

## 2021-12-21 RX ORDER — DEXTROAMPHETAMINE SACCHARATE, AMPHETAMINE ASPARTATE MONOHYDRATE, DEXTROAMPHETAMINE SULFATE AND AMPHETAMINE SULFATE 3.75; 3.75; 3.75; 3.75 MG/1; MG/1; MG/1; MG/1
15 CAPSULE, EXTENDED RELEASE ORAL EVERY MORNING
Qty: 30 CAPSULE | Refills: 0 | Status: SHIPPED | OUTPATIENT
Start: 2021-12-21 | End: 2022-01-24 | Stop reason: SDUPTHER

## 2021-12-21 NOTE — TELEPHONE ENCOUNTER
Chastity Ivan needs refill of   Requested Prescriptions     Pending Prescriptions Disp Refills    amphetamine-dextroamphetamine (ADDERALL XR) 15 MG extended release capsule 30 capsule 0     Sig: Take 1 capsule by mouth every morning for 30 days.  take 1 capsule by mouth every morning       Last Filled on:  11/18/2021    Last Visit Date:  6/21/2021    Next Visit Date:    Visit date not found

## 2022-01-24 DIAGNOSIS — F32.A ACUTE DEPRESSION: ICD-10-CM

## 2022-01-24 DIAGNOSIS — R41.840 POOR CONCENTRATION: ICD-10-CM

## 2022-01-24 RX ORDER — DEXTROAMPHETAMINE SACCHARATE, AMPHETAMINE ASPARTATE MONOHYDRATE, DEXTROAMPHETAMINE SULFATE AND AMPHETAMINE SULFATE 3.75; 3.75; 3.75; 3.75 MG/1; MG/1; MG/1; MG/1
15 CAPSULE, EXTENDED RELEASE ORAL EVERY MORNING
Qty: 30 CAPSULE | Refills: 0 | Status: SHIPPED | OUTPATIENT
Start: 2022-01-24 | End: 2022-02-25 | Stop reason: SDUPTHER

## 2022-01-24 NOTE — TELEPHONE ENCOUNTER
Chastity Ivan needs refill of   Requested Prescriptions     Pending Prescriptions Disp Refills    amphetamine-dextroamphetamine (ADDERALL XR) 15 MG extended release capsule 30 capsule 0     Sig: Take 1 capsule by mouth every morning for 30 days.  take 1 capsule by mouth every morning       Last Filled on:  12/21/2021    Last Visit Date:  Visit date not found    Next Visit Date:    Visit date not found

## 2022-01-24 NOTE — TELEPHONE ENCOUNTER
ep'ed adderall to pharm requested    Controlled Substance Monitoring:    Acute and Chronic Pain Monitoring:   RX Monitoring 1/24/2022   Periodic Controlled Substance Monitoring No signs of potential drug abuse or diversion identified.

## 2022-02-25 DIAGNOSIS — R41.840 POOR CONCENTRATION: ICD-10-CM

## 2022-02-25 DIAGNOSIS — F32.A ACUTE DEPRESSION: ICD-10-CM

## 2022-02-28 RX ORDER — DEXTROAMPHETAMINE SACCHARATE, AMPHETAMINE ASPARTATE MONOHYDRATE, DEXTROAMPHETAMINE SULFATE AND AMPHETAMINE SULFATE 3.75; 3.75; 3.75; 3.75 MG/1; MG/1; MG/1; MG/1
15 CAPSULE, EXTENDED RELEASE ORAL EVERY MORNING
Qty: 30 CAPSULE | Refills: 0 | Status: SHIPPED | OUTPATIENT
Start: 2022-02-28 | End: 2022-03-29 | Stop reason: SDUPTHER

## 2022-02-28 NOTE — TELEPHONE ENCOUNTER
ep'ed adderall to pharm requested      Controlled Substance Monitoring:    Acute and Chronic Pain Monitoring:   RX Monitoring 2/28/2022   Periodic Controlled Substance Monitoring No signs of potential drug abuse or diversion identified.

## 2022-03-29 DIAGNOSIS — R41.840 POOR CONCENTRATION: ICD-10-CM

## 2022-03-29 DIAGNOSIS — F32.A ACUTE DEPRESSION: ICD-10-CM

## 2022-03-29 RX ORDER — DEXTROAMPHETAMINE SACCHARATE, AMPHETAMINE ASPARTATE MONOHYDRATE, DEXTROAMPHETAMINE SULFATE AND AMPHETAMINE SULFATE 3.75; 3.75; 3.75; 3.75 MG/1; MG/1; MG/1; MG/1
15 CAPSULE, EXTENDED RELEASE ORAL EVERY MORNING
Qty: 30 CAPSULE | Refills: 0 | Status: SHIPPED | OUTPATIENT
Start: 2022-04-01 | End: 2022-04-29 | Stop reason: SDUPTHER

## 2022-03-29 NOTE — TELEPHONE ENCOUNTER
ep'ed adderall to pharm requested      Controlled Substance Monitoring:    Acute and Chronic Pain Monitoring:   RX Monitoring 3/29/2022   Periodic Controlled Substance Monitoring No signs of potential drug abuse or diversion identified.

## 2022-03-29 NOTE — TELEPHONE ENCOUNTER
Chastity Ivan needs refill of   Requested Prescriptions     Pending Prescriptions Disp Refills    amphetamine-dextroamphetamine (ADDERALL XR) 15 MG extended release capsule 30 capsule 0     Sig: Take 1 capsule by mouth every morning for 30 days.  take 1 capsule by mouth every morning       Last Filled on:  2/28/2022    Last Visit Date:  6/21/2021    Next Visit Date:    Visit date not found

## 2022-04-29 DIAGNOSIS — R41.840 POOR CONCENTRATION: ICD-10-CM

## 2022-04-29 DIAGNOSIS — F32.A ACUTE DEPRESSION: ICD-10-CM

## 2022-04-29 RX ORDER — DEXTROAMPHETAMINE SACCHARATE, AMPHETAMINE ASPARTATE MONOHYDRATE, DEXTROAMPHETAMINE SULFATE AND AMPHETAMINE SULFATE 3.75; 3.75; 3.75; 3.75 MG/1; MG/1; MG/1; MG/1
15 CAPSULE, EXTENDED RELEASE ORAL EVERY MORNING
Qty: 30 CAPSULE | Refills: 0 | Status: SHIPPED | OUTPATIENT
Start: 2022-04-29 | End: 2022-05-31 | Stop reason: SDUPTHER

## 2022-04-29 NOTE — TELEPHONE ENCOUNTER
Chastity Ivan needs refill of   Requested Prescriptions     Pending Prescriptions Disp Refills    amphetamine-dextroamphetamine (ADDERALL XR) 15 MG extended release capsule 30 capsule 0     Sig: Take 1 capsule by mouth every morning for 30 days.        Last Filled on:  4/1/2022    Last Visit Date:  6/21/2021    Next Visit Date:    Visit date not found

## 2022-04-29 NOTE — TELEPHONE ENCOUNTER
ep'ed adderall to pharm requested    Controlled Substance Monitoring:    Acute and Chronic Pain Monitoring:   RX Monitoring 4/29/2022   Periodic Controlled Substance Monitoring No signs of potential drug abuse or diversion identified.

## 2022-05-31 DIAGNOSIS — E03.4 HYPOTHYROIDISM DUE TO ACQUIRED ATROPHY OF THYROID: ICD-10-CM

## 2022-05-31 DIAGNOSIS — F32.A ACUTE DEPRESSION: ICD-10-CM

## 2022-05-31 DIAGNOSIS — R41.840 POOR CONCENTRATION: ICD-10-CM

## 2022-05-31 RX ORDER — LEVOTHYROXINE SODIUM 0.07 MG/1
TABLET ORAL
Qty: 90 TABLET | Refills: 0 | Status: SHIPPED | OUTPATIENT
Start: 2022-05-31 | End: 2022-06-13 | Stop reason: SDUPTHER

## 2022-05-31 RX ORDER — DEXTROAMPHETAMINE SACCHARATE, AMPHETAMINE ASPARTATE MONOHYDRATE, DEXTROAMPHETAMINE SULFATE AND AMPHETAMINE SULFATE 3.75; 3.75; 3.75; 3.75 MG/1; MG/1; MG/1; MG/1
15 CAPSULE, EXTENDED RELEASE ORAL EVERY MORNING
Qty: 30 CAPSULE | Refills: 0 | Status: SHIPPED | OUTPATIENT
Start: 2022-05-31 | End: 2022-06-30

## 2022-05-31 NOTE — TELEPHONE ENCOUNTER
Chastity Ivan needs refill of   Requested Prescriptions     Pending Prescriptions Disp Refills    levothyroxine (SYNTHROID) 75 MCG tablet 90 tablet 3     Sig: TAKE 1 TABLET DAILY    amphetamine-dextroamphetamine (ADDERALL XR) 15 MG extended release capsule 30 capsule 0     Sig: Take 1 capsule by mouth every morning for 30 days. Last Filled on:  6/21/2022, 4/29/2022    Last Visit Date:  6/21/2021    Next Visit Date:    Visit date not found    Patient is due for a physical in June.

## 2022-05-31 NOTE — TELEPHONE ENCOUNTER
ep'ed synthroid to send away and adderall to [facility]. Due for annual next month. Controlled Substance Monitoring:    Acute and Chronic Pain Monitoring:   RX Monitoring 5/31/2022   Periodic Controlled Substance Monitoring No signs of potential drug abuse or diversion identified.

## 2022-06-13 ENCOUNTER — OFFICE VISIT (OUTPATIENT)
Dept: FAMILY MEDICINE CLINIC | Age: 52
End: 2022-06-13
Payer: COMMERCIAL

## 2022-06-13 VITALS
SYSTOLIC BLOOD PRESSURE: 124 MMHG | DIASTOLIC BLOOD PRESSURE: 84 MMHG | HEIGHT: 63 IN | TEMPERATURE: 97.1 F | OXYGEN SATURATION: 97 % | HEART RATE: 90 BPM | BODY MASS INDEX: 29.16 KG/M2 | WEIGHT: 164.6 LBS | RESPIRATION RATE: 18 BRPM

## 2022-06-13 DIAGNOSIS — E03.4 HYPOTHYROIDISM DUE TO ACQUIRED ATROPHY OF THYROID: ICD-10-CM

## 2022-06-13 DIAGNOSIS — Z00.00 WELL ADULT EXAM: Primary | ICD-10-CM

## 2022-06-13 DIAGNOSIS — F41.9 ANXIETY: ICD-10-CM

## 2022-06-13 DIAGNOSIS — Z12.11 SCREEN FOR COLON CANCER: ICD-10-CM

## 2022-06-13 DIAGNOSIS — Z12.31 ENCOUNTER FOR SCREENING MAMMOGRAM FOR MALIGNANT NEOPLASM OF BREAST: ICD-10-CM

## 2022-06-13 DIAGNOSIS — F32.A DEPRESSION, UNSPECIFIED DEPRESSION TYPE: ICD-10-CM

## 2022-06-13 LAB
ALBUMIN SERPL-MCNC: 5 G/DL (ref 3.5–5.1)
ALP BLD-CCNC: 77 U/L (ref 38–126)
ALT SERPL-CCNC: 18 U/L (ref 11–66)
ANION GAP SERPL CALCULATED.3IONS-SCNC: 12 MEQ/L (ref 8–16)
AST SERPL-CCNC: 19 U/L (ref 5–40)
BASOPHILS # BLD: 1 %
BASOPHILS ABSOLUTE: 0.1 THOU/MM3 (ref 0–0.1)
BILIRUB SERPL-MCNC: 0.5 MG/DL (ref 0.3–1.2)
BUN BLDV-MCNC: 16 MG/DL (ref 7–22)
CALCIUM SERPL-MCNC: 9.9 MG/DL (ref 8.5–10.5)
CHLORIDE BLD-SCNC: 103 MEQ/L (ref 98–111)
CHOLESTEROL, TOTAL: 206 MG/DL (ref 100–199)
CO2: 28 MEQ/L (ref 23–33)
CREAT SERPL-MCNC: 0.8 MG/DL (ref 0.4–1.2)
EOSINOPHIL # BLD: 1.7 %
EOSINOPHILS ABSOLUTE: 0.1 THOU/MM3 (ref 0–0.4)
ERYTHROCYTE [DISTWIDTH] IN BLOOD BY AUTOMATED COUNT: 13.2 % (ref 11.5–14.5)
ERYTHROCYTE [DISTWIDTH] IN BLOOD BY AUTOMATED COUNT: 48.8 FL (ref 35–45)
GFR SERPL CREATININE-BSD FRML MDRD: 76 ML/MIN/1.73M2
GLUCOSE BLD-MCNC: 95 MG/DL (ref 70–108)
HCT VFR BLD CALC: 51.3 % (ref 37–47)
HDLC SERPL-MCNC: 62 MG/DL
HEMOGLOBIN: 15.8 GM/DL (ref 12–16)
IMMATURE GRANS (ABS): 0.01 THOU/MM3 (ref 0–0.07)
IMMATURE GRANULOCYTES: 0.2 %
LDL CHOLESTEROL CALCULATED: 121 MG/DL
LYMPHOCYTES # BLD: 22.3 %
LYMPHOCYTES ABSOLUTE: 1.3 THOU/MM3 (ref 1–4.8)
MCH RBC QN AUTO: 30.6 PG (ref 26–33)
MCHC RBC AUTO-ENTMCNC: 30.8 GM/DL (ref 32.2–35.5)
MCV RBC AUTO: 99.4 FL (ref 81–99)
MONOCYTES # BLD: 12.3 %
MONOCYTES ABSOLUTE: 0.7 THOU/MM3 (ref 0.4–1.3)
NUCLEATED RED BLOOD CELLS: 0 /100 WBC
PLATELET # BLD: 207 THOU/MM3 (ref 130–400)
PMV BLD AUTO: 12.1 FL (ref 9.4–12.4)
POTASSIUM SERPL-SCNC: 5.4 MEQ/L (ref 3.5–5.2)
RBC # BLD: 5.16 MILL/MM3 (ref 4.2–5.4)
SEG NEUTROPHILS: 62.5 %
SEGMENTED NEUTROPHILS ABSOLUTE COUNT: 3.7 THOU/MM3 (ref 1.8–7.7)
SODIUM BLD-SCNC: 143 MEQ/L (ref 135–145)
T4 FREE: 1.54 NG/DL (ref 0.93–1.76)
TOTAL PROTEIN: 7.3 G/DL (ref 6.1–8)
TRIGL SERPL-MCNC: 116 MG/DL (ref 0–199)
TSH SERPL DL<=0.05 MIU/L-ACNC: 2.1 UIU/ML (ref 0.4–4.2)
WBC # BLD: 5.9 THOU/MM3 (ref 4.8–10.8)

## 2022-06-13 PROCEDURE — 99396 PREV VISIT EST AGE 40-64: CPT | Performed by: FAMILY MEDICINE

## 2022-06-13 RX ORDER — LEVOTHYROXINE SODIUM 0.07 MG/1
TABLET ORAL
Qty: 90 TABLET | Refills: 3 | Status: SHIPPED | OUTPATIENT
Start: 2022-06-13

## 2022-06-13 RX ORDER — VENLAFAXINE HYDROCHLORIDE 150 MG/1
CAPSULE, EXTENDED RELEASE ORAL
Qty: 90 CAPSULE | Refills: 3 | Status: SHIPPED | OUTPATIENT
Start: 2022-06-13

## 2022-06-13 ASSESSMENT — ENCOUNTER SYMPTOMS
NAUSEA: 0
CONSTIPATION: 0
DIARRHEA: 0
SORE THROAT: 0
BACK PAIN: 0
EYE PAIN: 0
SHORTNESS OF BREATH: 0
ABDOMINAL PAIN: 0
CHEST TIGHTNESS: 0
BLOOD IN STOOL: 0
WHEEZING: 0
COUGH: 0
VOMITING: 0
RHINORRHEA: 0

## 2022-06-13 ASSESSMENT — PATIENT HEALTH QUESTIONNAIRE - PHQ9
10. IF YOU CHECKED OFF ANY PROBLEMS, HOW DIFFICULT HAVE THESE PROBLEMS MADE IT FOR YOU TO DO YOUR WORK, TAKE CARE OF THINGS AT HOME, OR GET ALONG WITH OTHER PEOPLE: 0
SUM OF ALL RESPONSES TO PHQ QUESTIONS 1-9: 0
8. MOVING OR SPEAKING SO SLOWLY THAT OTHER PEOPLE COULD HAVE NOTICED. OR THE OPPOSITE, BEING SO FIGETY OR RESTLESS THAT YOU HAVE BEEN MOVING AROUND A LOT MORE THAN USUAL: 0
2. FEELING DOWN, DEPRESSED OR HOPELESS: 0
SUM OF ALL RESPONSES TO PHQ QUESTIONS 1-9: 0
9. THOUGHTS THAT YOU WOULD BE BETTER OFF DEAD, OR OF HURTING YOURSELF: 0
7. TROUBLE CONCENTRATING ON THINGS, SUCH AS READING THE NEWSPAPER OR WATCHING TELEVISION: 0
6. FEELING BAD ABOUT YOURSELF - OR THAT YOU ARE A FAILURE OR HAVE LET YOURSELF OR YOUR FAMILY DOWN: 0
4. FEELING TIRED OR HAVING LITTLE ENERGY: 0
SUM OF ALL RESPONSES TO PHQ QUESTIONS 1-9: 0
5. POOR APPETITE OR OVEREATING: 0
SUM OF ALL RESPONSES TO PHQ9 QUESTIONS 1 & 2: 0
SUM OF ALL RESPONSES TO PHQ QUESTIONS 1-9: 0
3. TROUBLE FALLING OR STAYING ASLEEP: 0
1. LITTLE INTEREST OR PLEASURE IN DOING THINGS: 0

## 2022-06-13 NOTE — PATIENT INSTRUCTIONS
Patient Education        Well Visit, Women 48 to 72: Care Instructions  Overview     Well visits can help you stay healthy. Your doctor has checked your overall health and may have suggested ways to take good care of yourself. Your doctor also may have recommended tests. At home, you can help prevent illness withhealthy eating, regular exercise, and other steps. Follow-up care is a key part of your treatment and safety. Be sure to make and go to all appointments, and call your doctor if you are having problems. It's also a good idea to know your test results and keep alist of the medicines you take. How can you care for yourself at home?  Get screening tests that you and your doctor decide on. Screening helps find diseases before any symptoms appear.  Eat healthy foods. Choose fruits, vegetables, whole grains, protein, and low-fat dairy foods. Limit fat, especially saturated fat. Reduce salt in your diet.  Limit alcohol. Have no more than 1 drink a day or 7 drinks a week.  Get at least 30 minutes of exercise on most days of the week. Walking is a good choice. You also may want to do other activities, such as running, swimming, cycling, or playing tennis or team sports.  Reach and stay at a healthy weight. This will lower your risk for many problems, such as obesity, diabetes, heart disease, and high blood pressure.  Do not smoke. Smoking can make health problems worse. If you need help quitting, talk to your doctor about stop-smoking programs and medicines. These can increase your chances of quitting for good.  Care for your mental health. It is easy to get weighed down by worry and stress. Learn strategies to manage stress, like deep breathing and mindfulness, and stay connected with your family and community. If you find you often feel sad or hopeless, talk with your doctor. Treatment can help.    Talk to your doctor about whether you have any risk factors for sexually transmitted infections (STIs). You can help prevent STIs if you wait to have sex with a new partner (or partners) until you've each been tested for STIs. It also helps if you use condoms (male or female condoms) and if you limit your sex partners to one person who only has sex with you. Vaccines are available for some STIs.  If you think you may have a problem with alcohol or drug use, talk to your doctor. This includes prescription medicines (such as amphetamines and opioids) and illegal drugs (such as cocaine and methamphetamine). Your doctor can help you figure out what type of treatment is best for you.  Protect your skin from too much sun. When you're outdoors from 10 a.m. to 4 p.m., stay in the shade or cover up with clothing and a hat with a wide brim. Wear sunglasses that block UV rays. Even when it's cloudy, put broad-spectrum sunscreen (SPF 30 or higher) on any exposed skin.  See a dentist one or two times a year for checkups and to have your teeth cleaned.  Wear a seat belt in the car. When should you call for help? Watch closely for changes in your health, and be sure to contact your doctor if you have any problems or symptoms that concern you. Where can you learn more? Go to https://[employer]. org and sign in to your [facility] [employer] account. Enter K541 in the Joust box to learn more about \"Well Visit, Women 50 to 72: Care Instructions. \"     If you do not have an account, please click on the \"Sign Up Now\" link. Current as of: October 6, 2021               Content Version: 13.2  © 2612-0931 [employer]. Care instructions adapted under license by [facility] ([facility]). If you have questions about a medical condition or this instruction, always ask your healthcare professional. [address] any warranty or liability for your use of this information.

## 2022-06-13 NOTE — PROGRESS NOTES
2022    Lisa Ivan (:  1970) is a 49 y.o. female, here for a preventive medicine evaluation. Patient Active Problem List   Diagnosis    Depression    Cigarette nicotine dependence without complication    Hypothyroidism       Review of Systems   Constitutional: Positive for fatigue. Negative for chills, fever and unexpected weight change. HENT: Negative for congestion, ear pain, rhinorrhea and sore throat. Eyes: Negative for pain and visual disturbance. Respiratory: Negative for cough, chest tightness, shortness of breath and wheezing. Cardiovascular: Negative for chest pain and palpitations. Gastrointestinal: Negative for abdominal pain, blood in stool, constipation, diarrhea, nausea and vomiting. Genitourinary: Negative for difficulty urinating, frequency, hematuria and urgency. Musculoskeletal: Negative for back pain, joint swelling, myalgias and neck pain. Skin: Negative for rash. Neurological: Negative for dizziness and headaches. Hematological: Negative for adenopathy. Does not bruise/bleed easily. Psychiatric/Behavioral: Negative for behavioral problems and sleep disturbance. The patient is not nervous/anxious. Prior to Visit Medications    Medication Sig Taking? Authorizing Provider   levothyroxine (SYNTHROID) 75 MCG tablet TAKE 1 TABLET DAILY Yes Sheyla Rodarte MD   venlafaxine (EFFEXOR XR) 150 MG extended release capsule TAKE 1 CAPSULE ONCE DAILY Yes Sheyla Rodarte MD   amphetamine-dextroamphetamine (ADDERALL XR) 15 MG extended release capsule Take 1 capsule by mouth every morning for 30 days.  Yes Sheyla Rodarte MD   ibuprofen (ADVIL;MOTRIN) 200 MG tablet Take 200 mg by mouth every 6 hours as needed for Pain Yes Historical Provider, MD   acetaminophen (TYLENOL) 500 MG tablet Take 500 mg by mouth every 6 hours as needed for Pain Yes Historical Provider, MD        No Known Allergies    Past Medical History:   Diagnosis Date    Depression  Hypercholesterolemia     Hypothyroidism        Past Surgical History:   Procedure Laterality Date    ABLATION OF DYSRHYTHMIC FOCUS      BREAST SURGERY  2007    implant bilateral breast    TONSILLECTOMY      TUBAL LIGATION         Social History     Socioeconomic History    Marital status:      Spouse name: Not on file    Number of children: Not on file    Years of education: Not on file    Highest education level: Not on file   Occupational History    Not on file   Tobacco Use    Smoking status: Current Some Day Smoker     Packs/day: 0.10     Years: 20.00     Pack years: 2.00     Types: Cigarettes    Smokeless tobacco: Never Used   Vaping Use    Vaping Use: Never used   Substance and Sexual Activity    Alcohol use: Yes     Comment: occassionally     Drug use: No    Sexual activity: Not on file   Other Topics Concern    Not on file   Social History Narrative    Not on file     Social Determinants of Health     Financial Resource Strain: Low Risk     Difficulty of Paying Living Expenses: Not hard at all   Food Insecurity: No Food Insecurity    Worried About Running Out of Food in the Last Year: Never true    [place]a of Food in the Last Year: Never true   Transportation Needs:     Lack of Transportation (Medical): Not on file    Lack of Transportation (Non-Medical):  Not on file   Physical Activity:     Days of Exercise per Week: Not on file    Minutes of Exercise per Session: Not on file   Stress:     Feeling of Stress : Not on file   Social Connections:     Frequency of Communication with Friends and Family: Not on file    Frequency of Social Gatherings with Friends and Family: Not on file    Attends Anglican Services: Not on file    Active Member of Clubs or Organizations: Not on file    Attends Club or Organization Meetings: Not on file    Marital Status: Not on file   Intimate Partner Violence:     Fear of Current or Ex-Partner: Not on file    Emotionally Abused: Not on file    Physically Abused: Not on file    Sexually Abused: Not on file   Housing Stability:     Unable to Pay for Housing in the Last Year: Not on file    Number of Places Lived in the Last Year: Not on file    Unstable Housing in the Last Year: Not on file        Family History   Problem Relation Age of Onset    Cancer Father         Lung Cancer       ADVANCE DIRECTIVE: N, <no information>    Vitals:    06/13/22 1126   BP: 124/84   Pulse: 90   Resp: 18   Temp: 97.1 °F (36.2 °C)   TempSrc: Infrared   SpO2: 97%   Weight: 164 lb 9.6 oz (74.7 kg)   Height: 5' 2.7\" (1.593 m)     Estimated body mass index is 29.44 kg/m² as calculated from the following:    Height as of this encounter: 5' 2.7\" (1.593 m). Weight as of this encounter: 164 lb 9.6 oz (74.7 kg). Physical Exam  Vitals and nursing note reviewed. Constitutional:       Appearance: She is well-developed. HENT:      Head: Normocephalic and atraumatic. Right Ear: External ear normal.      Left Ear: External ear normal.      Nose: Nose normal.      Mouth/Throat:      Mouth: Mucous membranes are moist.   Eyes:      Pupils: Pupils are equal, round, and reactive to light. Neck:      Thyroid: No thyromegaly. Vascular: No carotid bruit. Cardiovascular:      Rate and Rhythm: Normal rate and regular rhythm. Heart sounds: Normal heart sounds. Pulmonary:      Breath sounds: Normal breath sounds. No wheezing or rales. Abdominal:      General: Bowel sounds are normal.      Palpations: Abdomen is soft. Tenderness: There is no abdominal tenderness. There is no guarding or rebound. Musculoskeletal:         General: Normal range of motion. Cervical back: Neck supple. Lymphadenopathy:      Cervical: No cervical adenopathy. Skin:     General: Skin is warm and dry. Findings: No rash. Neurological:      Mental Status: She is alert and oriented to person, place, and time. Cranial Nerves: No cranial nerve deficit. Deep Tendon Reflexes: Reflexes are normal and symmetric. No flowsheet data found. Lab Results   Component Value Date    CHOL 197 06/21/2021    CHOL 211 04/28/2020    CHOL 199 03/26/2019    TRIG 130 06/21/2021    TRIG 87 04/28/2020    TRIG 82 03/26/2019    HDL 61 06/21/2021    HDL 57 04/28/2020    HDL 60 03/26/2019    LDLCALC 110 06/21/2021    LDLCALC 137 04/28/2020    LDLCALC 123 03/26/2019    GLUCOSE 91 06/21/2021    GLUCOSE 90 04/30/2018       The 10-year ASCVD risk score (Archana Mares, et al., 2013) is: 2.9%    Values used to calculate the score:      Age: 49 years      Sex: Female      Is Non- : No      Diabetic: No      Tobacco smoker: Yes      Systolic Blood Pressure: 282 mmHg      Is BP treated: No      HDL Cholesterol: 61 mg/dL      Total Cholesterol: 197 mg/dL    Immunization History   Administered Date(s) Administered    COVID-19, Pfizer Purple top, DILUTE for use, 12+ yrs, 30mcg/0.3mL dose 03/12/2021    Influenza A (D9T4-03) Vaccine Nasal 10/23/2009       Health Maintenance   Topic Date Due    Pneumococcal 0-64 years Vaccine (1 - PCV) Never done    Depression Monitoring  Never done    HIV screen  Never done    Hepatitis C screen  Never done    DTaP/Tdap/Td vaccine (1 - Tdap) Never done    Colorectal Cancer Screen  Never done    COVID-19 Vaccine (2 - Pfizer 3-dose series) 04/02/2021    Breast cancer screen  08/02/2021    Shingles vaccine (1 of 2) Never done    Cervical cancer screen  02/21/2022    Flu vaccine (Season Ended) 09/01/2022    Lipids  06/21/2026    Hepatitis A vaccine  Aged Out    Hepatitis B vaccine  Aged Out    Hib vaccine  Aged Out    Meningococcal (ACWY) vaccine  Aged Out       Assessment & Plan   Well adult exam  -     Lipid Panel; Future  -     CBC with Auto Differential; Future  -     Comprehensive Metabolic Panel; Future  -     TSH; Future  -     T4, Free; Future  -     POCT Fecal Immunochemical Test (FIT);  Future  Encounter for screening mammogram for malignant neoplasm of breast  -     [facility] DIGITAL SCREEN W OR WO CAD BILATERAL; Future  Hypothyroidism due to acquired atrophy of thyroid  -     levothyroxine (SYNTHROID) 75 MCG tablet; TAKE 1 TABLET DAILY, Disp-90 tablet, R-3Normal  -     Lipid Panel; Future  -     TSH; Future  -     T4, Free; Future  Anxiety  -     venlafaxine (EFFEXOR XR) 150 MG extended release capsule; TAKE 1 CAPSULE ONCE DAILY, Disp-90 capsule, R-3Normal  Depression, unspecified depression type  -     venlafaxine (EFFEXOR XR) 150 MG extended release capsule; TAKE 1 CAPSULE ONCE DAILY, Disp-90 capsule, R-3Normal  -     Lipid Panel; Future  -     CBC with Auto Differential; Future  -     Comprehensive Metabolic Panel; Future  Screen for colon cancer  -     POCT Fecal Immunochemical Test (FIT); Future  Encouraged diet, exercise and weight loss. Reviewed health maintenance    No follow-ups on file.          --Rigoberto Tafoya MD

## 2022-06-14 ENCOUNTER — TELEPHONE (OUTPATIENT)
Dept: FAMILY MEDICINE CLINIC | Age: 52
End: 2022-06-14

## 2022-06-14 NOTE — TELEPHONE ENCOUNTER
----- Message from Zell Brunner, MD sent at 6/13/2022 10:24 PM EDT -----  Cholesterol at 206 with normal CRR. CMP is good, isolated slight increased potassium, never had before, likely lab prep error. Thyroids are good. CBC is ok. Continue present.

## 2022-06-14 NOTE — TELEPHONE ENCOUNTER
Per HIPAA, left a detailed message with results and recommendations. Asked patient to call the office with questions or concerns.

## 2022-06-30 DIAGNOSIS — R41.840 POOR CONCENTRATION: ICD-10-CM

## 2022-06-30 DIAGNOSIS — F32.A ACUTE DEPRESSION: ICD-10-CM

## 2022-06-30 RX ORDER — DEXTROAMPHETAMINE/AMPHETAMINE 15 MG
CAPSULE, EXT RELEASE 24 HR ORAL
Qty: 30 CAPSULE | Refills: 0 | Status: SHIPPED | OUTPATIENT
Start: 2022-06-30 | End: 2022-07-29

## 2022-06-30 NOTE — TELEPHONE ENCOUNTER
Chastity Ivan needs refill of   Requested Prescriptions     Pending Prescriptions Disp Refills    ADDERALL XR 15 MG capsule [Pharmacy Med Name: ADDERALL XR 15 MG CAPSULE] 30 capsule      Sig: take 1 capsule by mouth every morning       Last Filled on:  5/31/22    Last Visit Date:  6/13/2022    Next Visit Date:    Visit date not found

## 2022-06-30 NOTE — TELEPHONE ENCOUNTER
Medication ep'ed to requested pharmacy. PDMP Monitoring:    Last PDMP Akua Lovelace as Reviewed [facility]):  Review User Review Instant Review Result   MATIASCARLIE MATTIE 6/30/2022 11:32 AM Reviewed PDMP [1]     [unfilled]  Urine Drug Screenings (1 yr)    No resulted procedures found.        Medication Contract and Consent for Opioid Use Documents Filed      No documents found

## 2022-07-29 DIAGNOSIS — R41.840 POOR CONCENTRATION: ICD-10-CM

## 2022-07-29 DIAGNOSIS — F32.A ACUTE DEPRESSION: ICD-10-CM

## 2022-07-29 RX ORDER — DEXTROAMPHETAMINE/AMPHETAMINE 15 MG
CAPSULE, EXT RELEASE 24 HR ORAL
Qty: 30 CAPSULE | OUTPATIENT
Start: 2022-07-29 | End: 2022-08-28

## 2022-07-29 RX ORDER — DEXTROAMPHETAMINE SACCHARATE, AMPHETAMINE ASPARTATE MONOHYDRATE, DEXTROAMPHETAMINE SULFATE AND AMPHETAMINE SULFATE 3.75; 3.75; 3.75; 3.75 MG/1; MG/1; MG/1; MG/1
15 CAPSULE, EXTENDED RELEASE ORAL EVERY MORNING
Qty: 30 CAPSULE | Refills: 0 | Status: SHIPPED | OUTPATIENT
Start: 2022-07-29 | End: 2022-08-11 | Stop reason: ALTCHOICE

## 2022-07-29 RX ORDER — DEXTROAMPHETAMINE SACCHARATE, AMPHETAMINE ASPARTATE MONOHYDRATE, DEXTROAMPHETAMINE SULFATE AND AMPHETAMINE SULFATE 3.75; 3.75; 3.75; 3.75 MG/1; MG/1; MG/1; MG/1
CAPSULE, EXTENDED RELEASE ORAL
Qty: 30 CAPSULE | Refills: 0 | OUTPATIENT
Start: 2022-07-29 | End: 2022-08-28

## 2022-07-29 NOTE — TELEPHONE ENCOUNTER
Ep'ed adderall to pharm requested      Controlled Substance Monitoring:    Acute and Chronic Pain Monitoring:   RX Monitoring 7/29/2022   Periodic Controlled Substance Monitoring No signs of potential drug abuse or diversion identified.

## 2022-08-03 ENCOUNTER — HOSPITAL ENCOUNTER (INPATIENT)
Age: 52
LOS: 3 days | Discharge: HOME | DRG: 286 | End: 2022-08-08
Attending: STUDENT IN AN ORGANIZED HEALTH CARE EDUCATION/TRAINING PROGRAM | Admitting: PHYSICIAN ASSISTANT
Payer: COMMERCIAL

## 2022-08-03 ENCOUNTER — APPOINTMENT (OUTPATIENT)
Dept: CT IMAGING | Age: 52
DRG: 286 | End: 2022-08-03
Payer: COMMERCIAL

## 2022-08-03 ENCOUNTER — OFFICE VISIT (OUTPATIENT)
Dept: FAMILY MEDICINE CLINIC | Age: 52
End: 2022-08-03
Payer: COMMERCIAL

## 2022-08-03 ENCOUNTER — TELEPHONE (OUTPATIENT)
Dept: FAMILY MEDICINE CLINIC | Age: 52
End: 2022-08-03

## 2022-08-03 ENCOUNTER — TELEPHONE (OUTPATIENT)
Dept: INTERNAL MEDICINE CLINIC | Age: 52
End: 2022-08-03

## 2022-08-03 VITALS
RESPIRATION RATE: 18 BRPM | TEMPERATURE: 97.2 F | WEIGHT: 169.2 LBS | BODY MASS INDEX: 29.98 KG/M2 | OXYGEN SATURATION: 97 % | SYSTOLIC BLOOD PRESSURE: 126 MMHG | DIASTOLIC BLOOD PRESSURE: 84 MMHG | HEIGHT: 63 IN | HEART RATE: 95 BPM

## 2022-08-03 DIAGNOSIS — F41.9 ANXIETY: ICD-10-CM

## 2022-08-03 DIAGNOSIS — R63.5 WEIGHT GAIN: ICD-10-CM

## 2022-08-03 DIAGNOSIS — I50.9 NEW ONSET OF CONGESTIVE HEART FAILURE (HCC): ICD-10-CM

## 2022-08-03 DIAGNOSIS — J90 PLEURAL EFFUSION: Primary | ICD-10-CM

## 2022-08-03 DIAGNOSIS — R60.9 PERIPHERAL EDEMA: ICD-10-CM

## 2022-08-03 DIAGNOSIS — R06.02 SOB (SHORTNESS OF BREATH) ON EXERTION: Primary | ICD-10-CM

## 2022-08-03 DIAGNOSIS — R94.31 ABNORMAL EKG: ICD-10-CM

## 2022-08-03 DIAGNOSIS — F32.A DEPRESSION, UNSPECIFIED DEPRESSION TYPE: ICD-10-CM

## 2022-08-03 LAB
ALBUMIN SERPL-MCNC: 4.1 G/DL (ref 3.5–5.1)
ALP BLD-CCNC: 118 U/L (ref 38–126)
ALT SERPL-CCNC: 45 U/L (ref 11–66)
ANION GAP SERPL CALCULATED.3IONS-SCNC: 15 MEQ/L (ref 8–16)
ANION GAP SERPL CALCULATED.3IONS-SCNC: 21 MEQ/L (ref 8–16)
AST SERPL-CCNC: 32 U/L (ref 5–40)
BASOPHILS # BLD: 0.8 %
BASOPHILS # BLD: 0.8 %
BASOPHILS ABSOLUTE: 0.1 THOU/MM3 (ref 0–0.1)
BASOPHILS ABSOLUTE: 0.1 THOU/MM3 (ref 0–0.1)
BILIRUB SERPL-MCNC: 1.1 MG/DL (ref 0.3–1.2)
BUN BLDV-MCNC: 14 MG/DL (ref 7–22)
BUN BLDV-MCNC: 14 MG/DL (ref 7–22)
CALCIUM SERPL-MCNC: 9.4 MG/DL (ref 8.5–10.5)
CALCIUM SERPL-MCNC: 9.9 MG/DL (ref 8.5–10.5)
CHLORIDE BLD-SCNC: 100 MEQ/L (ref 98–111)
CHLORIDE BLD-SCNC: 100 MEQ/L (ref 98–111)
CO2: 19 MEQ/L (ref 23–33)
CO2: 21 MEQ/L (ref 23–33)
CREAT SERPL-MCNC: 0.9 MG/DL (ref 0.4–1.2)
CREAT SERPL-MCNC: 1 MG/DL (ref 0.4–1.2)
D-DIMER QUANTITATIVE: 1387 NG/ML FEU (ref 0–500)
D-DIMER QUANTITATIVE: 1866 NG/ML FEU (ref 0–500)
EKG ATRIAL RATE: 109 BPM
EKG P AXIS: 60 DEGREES
EKG P-R INTERVAL: 156 MS
EKG Q-T INTERVAL: 372 MS
EKG QRS DURATION: 150 MS
EKG QTC CALCULATION (BAZETT): 500 MS
EKG R AXIS: -90 DEGREES
EKG T AXIS: 64 DEGREES
EKG VENTRICULAR RATE: 109 BPM
EOSINOPHIL # BLD: 0.5 %
EOSINOPHIL # BLD: 0.9 %
EOSINOPHILS ABSOLUTE: 0 THOU/MM3 (ref 0–0.4)
EOSINOPHILS ABSOLUTE: 0.1 THOU/MM3 (ref 0–0.4)
ERYTHROCYTE [DISTWIDTH] IN BLOOD BY AUTOMATED COUNT: 13.7 % (ref 11.5–14.5)
ERYTHROCYTE [DISTWIDTH] IN BLOOD BY AUTOMATED COUNT: 13.8 % (ref 11.5–14.5)
ERYTHROCYTE [DISTWIDTH] IN BLOOD BY AUTOMATED COUNT: 47.1 FL (ref 35–45)
ERYTHROCYTE [DISTWIDTH] IN BLOOD BY AUTOMATED COUNT: 47.7 FL (ref 35–45)
GFR SERPL CREATININE-BSD FRML MDRD: 58 ML/MIN/1.73M2
GFR SERPL CREATININE-BSD FRML MDRD: 66 ML/MIN/1.73M2
GLUCOSE BLD-MCNC: 125 MG/DL (ref 70–108)
GLUCOSE BLD-MCNC: 133 MG/DL (ref 70–108)
HCT VFR BLD CALC: 48.2 % (ref 37–47)
HCT VFR BLD CALC: 49.3 % (ref 37–47)
HEMOGLOBIN: 15.6 GM/DL (ref 12–16)
HEMOGLOBIN: 15.8 GM/DL (ref 12–16)
IMMATURE GRANS (ABS): 0.03 THOU/MM3 (ref 0–0.07)
IMMATURE GRANS (ABS): 0.03 THOU/MM3 (ref 0–0.07)
IMMATURE GRANULOCYTES: 0.3 %
IMMATURE GRANULOCYTES: 0.4 %
LYMPHOCYTES # BLD: 19.9 %
LYMPHOCYTES # BLD: 20.6 %
LYMPHOCYTES ABSOLUTE: 1.6 THOU/MM3 (ref 1–4.8)
LYMPHOCYTES ABSOLUTE: 1.8 THOU/MM3 (ref 1–4.8)
MCH RBC QN AUTO: 30.3 PG (ref 26–33)
MCH RBC QN AUTO: 30.6 PG (ref 26–33)
MCHC RBC AUTO-ENTMCNC: 32 GM/DL (ref 32.2–35.5)
MCHC RBC AUTO-ENTMCNC: 32.4 GM/DL (ref 32.2–35.5)
MCV RBC AUTO: 94.6 FL (ref 81–99)
MCV RBC AUTO: 94.7 FL (ref 81–99)
MONOCYTES # BLD: 10.9 %
MONOCYTES # BLD: 11.7 %
MONOCYTES ABSOLUTE: 0.9 THOU/MM3 (ref 0.4–1.3)
MONOCYTES ABSOLUTE: 1 THOU/MM3 (ref 0.4–1.3)
NUCLEATED RED BLOOD CELLS: 0 /100 WBC
NUCLEATED RED BLOOD CELLS: 0 /100 WBC
OSMOLALITY CALCULATION: 274.3 MOSMOL/KG (ref 275–300)
PLATELET # BLD: 205 THOU/MM3 (ref 130–400)
PLATELET # BLD: 209 THOU/MM3 (ref 130–400)
PMV BLD AUTO: 11.3 FL (ref 9.4–12.4)
PMV BLD AUTO: 12.3 FL (ref 9.4–12.4)
POTASSIUM REFLEX MAGNESIUM: 3.8 MEQ/L (ref 3.5–5.2)
POTASSIUM SERPL-SCNC: 4.7 MEQ/L (ref 3.5–5.2)
PRO-BNP: ABNORMAL PG/ML (ref 0–900)
RBC # BLD: 5.09 MILL/MM3 (ref 4.2–5.4)
RBC # BLD: 5.21 MILL/MM3 (ref 4.2–5.4)
SEG NEUTROPHILS: 66.1 %
SEG NEUTROPHILS: 67.1 %
SEGMENTED NEUTROPHILS ABSOLUTE COUNT: 5.4 THOU/MM3 (ref 1.8–7.7)
SEGMENTED NEUTROPHILS ABSOLUTE COUNT: 5.8 THOU/MM3 (ref 1.8–7.7)
SODIUM BLD-SCNC: 136 MEQ/L (ref 135–145)
SODIUM BLD-SCNC: 140 MEQ/L (ref 135–145)
TOTAL PROTEIN: 6.5 G/DL (ref 6.1–8)
TROPONIN T: < 0.01 NG/ML
WBC # BLD: 8 THOU/MM3 (ref 4.8–10.8)
WBC # BLD: 8.8 THOU/MM3 (ref 4.8–10.8)

## 2022-08-03 PROCEDURE — 99220 PR INITIAL OBSERVATION CARE/DAY 70 MINUTES: CPT | Performed by: PHYSICIAN ASSISTANT

## 2022-08-03 PROCEDURE — 93005 ELECTROCARDIOGRAM TRACING: CPT | Performed by: STUDENT IN AN ORGANIZED HEALTH CARE EDUCATION/TRAINING PROGRAM

## 2022-08-03 PROCEDURE — 96372 THER/PROPH/DIAG INJ SC/IM: CPT

## 2022-08-03 PROCEDURE — 93000 ELECTROCARDIOGRAM COMPLETE: CPT | Performed by: FAMILY MEDICINE

## 2022-08-03 PROCEDURE — 99285 EMERGENCY DEPT VISIT HI MDM: CPT

## 2022-08-03 PROCEDURE — 96375 TX/PRO/DX INJ NEW DRUG ADDON: CPT

## 2022-08-03 PROCEDURE — 84484 ASSAY OF TROPONIN QUANT: CPT

## 2022-08-03 PROCEDURE — 2580000003 HC RX 258: Performed by: STUDENT IN AN ORGANIZED HEALTH CARE EDUCATION/TRAINING PROGRAM

## 2022-08-03 PROCEDURE — 96361 HYDRATE IV INFUSION ADD-ON: CPT

## 2022-08-03 PROCEDURE — 83880 ASSAY OF NATRIURETIC PEPTIDE: CPT

## 2022-08-03 PROCEDURE — 93010 ELECTROCARDIOGRAM REPORT: CPT | Performed by: INTERNAL MEDICINE

## 2022-08-03 PROCEDURE — G0378 HOSPITAL OBSERVATION PER HR: HCPCS

## 2022-08-03 PROCEDURE — 96360 HYDRATION IV INFUSION INIT: CPT

## 2022-08-03 PROCEDURE — 85025 COMPLETE CBC W/AUTO DIFF WBC: CPT

## 2022-08-03 PROCEDURE — 80053 COMPREHEN METABOLIC PANEL: CPT

## 2022-08-03 PROCEDURE — 6370000000 HC RX 637 (ALT 250 FOR IP): Performed by: PHYSICIAN ASSISTANT

## 2022-08-03 PROCEDURE — 71275 CT ANGIOGRAPHY CHEST: CPT

## 2022-08-03 PROCEDURE — 85379 FIBRIN DEGRADATION QUANT: CPT

## 2022-08-03 PROCEDURE — 6360000002 HC RX W HCPCS: Performed by: PHYSICIAN ASSISTANT

## 2022-08-03 PROCEDURE — 99214 OFFICE O/P EST MOD 30 MIN: CPT | Performed by: FAMILY MEDICINE

## 2022-08-03 PROCEDURE — 2580000003 HC RX 258: Performed by: PHYSICIAN ASSISTANT

## 2022-08-03 PROCEDURE — 6360000004 HC RX CONTRAST MEDICATION: Performed by: STUDENT IN AN ORGANIZED HEALTH CARE EDUCATION/TRAINING PROGRAM

## 2022-08-03 RX ORDER — ONDANSETRON 2 MG/ML
4 INJECTION INTRAMUSCULAR; INTRAVENOUS EVERY 6 HOURS PRN
Status: DISCONTINUED | OUTPATIENT
Start: 2022-08-03 | End: 2022-08-08 | Stop reason: HOSPADM

## 2022-08-03 RX ORDER — SODIUM CHLORIDE, SODIUM LACTATE, POTASSIUM CHLORIDE, AND CALCIUM CHLORIDE .6; .31; .03; .02 G/100ML; G/100ML; G/100ML; G/100ML
1000 INJECTION, SOLUTION INTRAVENOUS ONCE
Status: COMPLETED | OUTPATIENT
Start: 2022-08-03 | End: 2022-08-03

## 2022-08-03 RX ORDER — DIPHENHYDRAMINE HYDROCHLORIDE 50 MG/ML
25 INJECTION INTRAMUSCULAR; INTRAVENOUS NIGHTLY PRN
Status: DISCONTINUED | OUTPATIENT
Start: 2022-08-03 | End: 2022-08-08 | Stop reason: HOSPADM

## 2022-08-03 RX ORDER — ACETAMINOPHEN 325 MG/1
650 TABLET ORAL EVERY 6 HOURS PRN
Status: DISCONTINUED | OUTPATIENT
Start: 2022-08-03 | End: 2022-08-08 | Stop reason: HOSPADM

## 2022-08-03 RX ORDER — FUROSEMIDE 10 MG/ML
20 INJECTION INTRAMUSCULAR; INTRAVENOUS ONCE
Status: DISCONTINUED | OUTPATIENT
Start: 2022-08-03 | End: 2022-08-03

## 2022-08-03 RX ORDER — VENLAFAXINE HYDROCHLORIDE 150 MG/1
150 CAPSULE, EXTENDED RELEASE ORAL
Status: DISCONTINUED | OUTPATIENT
Start: 2022-08-04 | End: 2022-08-08 | Stop reason: HOSPADM

## 2022-08-03 RX ORDER — SODIUM CHLORIDE 0.9 % (FLUSH) 0.9 %
10 SYRINGE (ML) INJECTION EVERY 12 HOURS SCHEDULED
Status: DISCONTINUED | OUTPATIENT
Start: 2022-08-03 | End: 2022-08-08 | Stop reason: HOSPADM

## 2022-08-03 RX ORDER — SODIUM CHLORIDE 9 MG/ML
INJECTION, SOLUTION INTRAVENOUS PRN
Status: DISCONTINUED | OUTPATIENT
Start: 2022-08-03 | End: 2022-08-08 | Stop reason: HOSPADM

## 2022-08-03 RX ORDER — ENOXAPARIN SODIUM 100 MG/ML
40 INJECTION SUBCUTANEOUS EVERY 24 HOURS
Status: DISCONTINUED | OUTPATIENT
Start: 2022-08-03 | End: 2022-08-08 | Stop reason: HOSPADM

## 2022-08-03 RX ORDER — ONDANSETRON 4 MG/1
4 TABLET, ORALLY DISINTEGRATING ORAL EVERY 8 HOURS PRN
Status: DISCONTINUED | OUTPATIENT
Start: 2022-08-03 | End: 2022-08-08 | Stop reason: HOSPADM

## 2022-08-03 RX ORDER — DIPHENHYDRAMINE HYDROCHLORIDE 50 MG/ML
25 INJECTION INTRAMUSCULAR; INTRAVENOUS NIGHTLY PRN
Status: DISCONTINUED | OUTPATIENT
Start: 2022-08-04 | End: 2022-08-03

## 2022-08-03 RX ORDER — POLYETHYLENE GLYCOL 3350 17 G/17G
17 POWDER, FOR SOLUTION ORAL DAILY PRN
Status: DISCONTINUED | OUTPATIENT
Start: 2022-08-03 | End: 2022-08-08 | Stop reason: HOSPADM

## 2022-08-03 RX ORDER — NITROGLYCERIN 0.4 MG/1
0.4 TABLET SUBLINGUAL EVERY 5 MIN PRN
Status: DISCONTINUED | OUTPATIENT
Start: 2022-08-03 | End: 2022-08-08 | Stop reason: HOSPADM

## 2022-08-03 RX ORDER — FUROSEMIDE 10 MG/ML
20 INJECTION INTRAMUSCULAR; INTRAVENOUS 2 TIMES DAILY
Status: COMPLETED | OUTPATIENT
Start: 2022-08-03 | End: 2022-08-04

## 2022-08-03 RX ORDER — LEVOTHYROXINE SODIUM 0.07 MG/1
75 TABLET ORAL DAILY
Status: DISCONTINUED | OUTPATIENT
Start: 2022-08-04 | End: 2022-08-08 | Stop reason: HOSPADM

## 2022-08-03 RX ORDER — SODIUM CHLORIDE 0.9 % (FLUSH) 0.9 %
10 SYRINGE (ML) INJECTION PRN
Status: DISCONTINUED | OUTPATIENT
Start: 2022-08-03 | End: 2022-08-08 | Stop reason: HOSPADM

## 2022-08-03 RX ORDER — ACETAMINOPHEN 650 MG/1
650 SUPPOSITORY RECTAL EVERY 6 HOURS PRN
Status: DISCONTINUED | OUTPATIENT
Start: 2022-08-03 | End: 2022-08-08 | Stop reason: HOSPADM

## 2022-08-03 RX ADMIN — FUROSEMIDE 20 MG: 10 INJECTION, SOLUTION INTRAMUSCULAR; INTRAVENOUS at 21:46

## 2022-08-03 RX ADMIN — METOPROLOL TARTRATE 25 MG: 25 TABLET, FILM COATED ORAL at 23:22

## 2022-08-03 RX ADMIN — ENOXAPARIN SODIUM 40 MG: 100 INJECTION SUBCUTANEOUS at 21:46

## 2022-08-03 RX ADMIN — IOPAMIDOL 80 ML: 755 INJECTION, SOLUTION INTRAVENOUS at 16:38

## 2022-08-03 RX ADMIN — SODIUM CHLORIDE, PRESERVATIVE FREE 10 ML: 5 INJECTION INTRAVENOUS at 21:46

## 2022-08-03 RX ADMIN — SODIUM CHLORIDE, POTASSIUM CHLORIDE, SODIUM LACTATE AND CALCIUM CHLORIDE 1000 ML: 600; 310; 30; 20 INJECTION, SOLUTION INTRAVENOUS at 17:26

## 2022-08-03 SDOH — ECONOMIC STABILITY: FOOD INSECURITY: WITHIN THE PAST 12 MONTHS, YOU WORRIED THAT YOUR FOOD WOULD RUN OUT BEFORE YOU GOT MONEY TO BUY MORE.: NEVER TRUE

## 2022-08-03 SDOH — ECONOMIC STABILITY: FOOD INSECURITY: WITHIN THE PAST 12 MONTHS, THE FOOD YOU BOUGHT JUST DIDN'T LAST AND YOU DIDN'T HAVE MONEY TO GET MORE.: NEVER TRUE

## 2022-08-03 ASSESSMENT — SOCIAL DETERMINANTS OF HEALTH (SDOH): HOW HARD IS IT FOR YOU TO PAY FOR THE VERY BASICS LIKE FOOD, HOUSING, MEDICAL CARE, AND HEATING?: NOT HARD AT ALL

## 2022-08-03 ASSESSMENT — ENCOUNTER SYMPTOMS
VOMITING: 0
SHORTNESS OF BREATH: 1
CHEST TIGHTNESS: 0
EYES NEGATIVE: 1
NAUSEA: 0
DIARRHEA: 0
SHORTNESS OF BREATH: 1
BACK PAIN: 0
APNEA: 1
CONSTIPATION: 0
SORE THROAT: 0
COUGH: 0
ALLERGIC/IMMUNOLOGIC NEGATIVE: 1
ABDOMINAL DISTENTION: 1
ABDOMINAL PAIN: 0
COUGH: 1
EYE PAIN: 0
ABDOMINAL DISTENTION: 1
BLOOD IN STOOL: 0
DIARRHEA: 1
RHINORRHEA: 0
WHEEZING: 0

## 2022-08-03 ASSESSMENT — PAIN - FUNCTIONAL ASSESSMENT
PAIN_FUNCTIONAL_ASSESSMENT: 0-10
PAIN_FUNCTIONAL_ASSESSMENT: ACTIVITIES ARE NOT PREVENTED
PAIN_FUNCTIONAL_ASSESSMENT: ACTIVITIES ARE NOT PREVENTED

## 2022-08-03 ASSESSMENT — PAIN SCALES - GENERAL
PAINLEVEL_OUTOF10: 6
PAINLEVEL_OUTOF10: 7
PAINLEVEL_OUTOF10: 5

## 2022-08-03 ASSESSMENT — PAIN DESCRIPTION - ORIENTATION: ORIENTATION: RIGHT;LEFT

## 2022-08-03 ASSESSMENT — PAIN DESCRIPTION - PAIN TYPE
TYPE: ACUTE PAIN
TYPE: ACUTE PAIN

## 2022-08-03 ASSESSMENT — PAIN DESCRIPTION - ONSET: ONSET: ON-GOING

## 2022-08-03 ASSESSMENT — PAIN DESCRIPTION - FREQUENCY
FREQUENCY: CONTINUOUS
FREQUENCY: CONTINUOUS

## 2022-08-03 ASSESSMENT — PAIN DESCRIPTION - LOCATION
LOCATION: CHEST
LOCATION: CHEST

## 2022-08-03 ASSESSMENT — PAIN DESCRIPTION - DESCRIPTORS: DESCRIPTORS: ACHING

## 2022-08-03 ASSESSMENT — PAIN DESCRIPTION - DIRECTION: RADIATING_TOWARDS: BACK

## 2022-08-03 NOTE — ED NOTES
ED to inpatient nurses report    Chief Complaint   Patient presents with    Chest Pain     Elevated D Dimer    Shortness of Breath             Present to ED from home  LOC: alert and orientated to name, place, date  Vital signs   Vitals:    08/03/22 1549 08/03/22 1612 08/03/22 1727   BP: (!) 137/97 129/87    Pulse: (!) 107 (!) 101 (!) 105   Resp: 16 19 15   Temp: 98.4 °F (36.9 °C)     TempSrc: Oral     SpO2: 97% 96% 98%      Oxygen Baseline room air    Current needs required room air Bipap/Cpap No  LDAs:   Peripheral IV 08/03/22 Left;Upper Arm (Active)   Site Assessment Clean, dry & intact 08/03/22 1600   Line Status Flushed;Brisk blood return 08/03/22 1600     Mobility: Independent  Pending ED orders: still has LR bolus infusing at this time.    Present condition: stable      C-SSRS Risk of Suicide: No Risk  Swallow Screening      Electronically signed by Majo Deluca RN on 8/3/2022 at 6:58 PM       Majo Deluca RN  08/03/22 6522

## 2022-08-03 NOTE — ED PROVIDER NOTES
[facility] [address] [address] EMERGENCY DEPT  EMERGENCY DEPARTMENT     Pt Name: Brittani Ayoub  MRN: [id number]  [place] 1970  Date of evaluation: 8/3/2022  Provider: Deja Blum MD,     [address] [place] [address]       Chief Complaint   Patient presents with    Chest Pain     Elevated D Dimer    Shortness of Breath              HISTORY OF PRESENT ILLNESS    Brittani Ayoub is a 47 y.o. female who presents to the emergency department from home with chief complaint of shortness of breath. Patient reports symptom onset was around 3 to 4 weeks ago. Symptoms are gradually worsened. Her primary care physician ordered a D-dimer that was markedly elevated and sent the patient over for evaluation. The patient reports mild pleuritic pain. She reports weight gain as well as bilateral lower extremity swelling greater than baseline. She reports dyspnea on exertion. She describes weight gain despite several weeks of nonbloody diarrhea. She denies abdominal pain, nausea, vomiting. Triage notes and Nursing notes were reviewed by myself. Any discrepancies are addressed above. PAST MEDICAL HISTORY     Past Medical History:   Diagnosis Date    Depression     Hypercholesterolemia     Hypothyroidism        SURGICAL HISTORY       Past Surgical History:   Procedure Laterality Date    ABLATION OF DYSRHYTHMIC FOCUS      BREAST SURGERY  2007    implant bilateral breast    TONSILLECTOMY      TUBAL LIGATION         CURRENT MEDICATIONS       Previous Medications    ACETAMINOPHEN (TYLENOL) 500 MG TABLET    Take 500 mg by mouth every 6 hours as needed for Pain    AMPHETAMINE-DEXTROAMPHETAMINE (ADDERALL XR) 15 MG EXTENDED RELEASE CAPSULE    Take 1 capsule by mouth every morning for 30 days.     IBUPROFEN (ADVIL;MOTRIN) 200 MG TABLET    Take 200 mg by mouth every 6 hours as needed for Pain    LEVOTHYROXINE (SYNTHROID) 75 MCG TABLET    TAKE 1 TABLET DAILY    VENLAFAXINE (EFFEXOR XR) 150 MG EXTENDED RELEASE CAPSULE    TAKE 1 CAPSULE ONCE DAILY conjunctiva normal   HENT:  Atraumatic, external ears normal, nose normal, Neck-no JVD  Respiratory:  No respiratory distress, normal breath sounds, no rales, no wheezing   Cardiovascular: Tachycardia, normal rhythm, no murmurs, no gallops, no rubs   GI:  Soft, nondistended, normal bowel sounds, nontender, no organomegaly, no mass, no rebound, no guarding   :  No costovertebral angle tenderness   Musculoskeletal:  No edema, no tenderness, no deformities. Back- no tenderness  Integument:  Well hydrated, no rash   Neurologic:  Alert & oriented x 3, no focal deficits noted   Psychiatric:  Speech and behavior appropriate  DIAGNOSTIC RESULTS     EKG:(none if blank)  All EKG's are interpreted by [facility] who either signs or Co-signs this chart in the absence of a cardiologist.    EKG 1536: Sinus tachycardia. Left bundle branch block. Negative sgarossa criteria. Indeterminate axis. Rate 109    RADIOLOGY: (none if blank)   Interpretation per the Radiologistbelow, if available at the time of this note:    CTA [place] 89   Final Result      1. No evidence of pulmonary emboli. 2. Mild cardiomegaly. 3. Moderate right and small left pleural effusions. 4. Areas of atelectasis or infiltrate in the right and left lower lobes. 5. Bilateral breast implants in place. **This report has been created using voice recognition software. It may contain minor errors which are inherent in voice recognition technology. **      Final report electronically signed by DR Tay Garza on 8/3/2022 5:00 PM          LABS:  Labs Reviewed   CBC WITH AUTO DIFFERENTIAL - Abnormal; Notable for the following components:       Result Value    Hematocrit 48.2 (*)     RDW-SD 47.1 (*)     All other components within normal limits   COMPREHENSIVE METABOLIC PANEL W/ REFLEX TO MG FOR LOW K - Abnormal; Notable for the following components:    Glucose 133 (*)     CO2 21 (*)     All other components within normal limits   D-DIMER, QUANTITATIVE - Abnormal; Notable for the following components:    D-Dimer, Quant 1387.00 (*)     All other components within normal limits   BRAIN NATRIURETIC PEPTIDE - Abnormal; Notable for the following components:    Pro-BNP 08805.0 (*)     All other components within normal limits   GLOMERULAR FILTRATION RATE, ESTIMATED - Abnormal; Notable for the following components:    Est, Glom Filt Rate 66 (*)     All other components within normal limits   OSMOLALITY - Abnormal; Notable for the following components:    Osmolality Calc 274.3 (*)     All other components within normal limits   TROPONIN   ANION GAP       All other labs were within normal range or not returned as of this dictation. Please note, any cultures that may have been sent were not resulted at the time of this patient visit. EMERGENCY DEPARTMENT COURSE andMedical Decision Making:     MDM  /   Vital signs unremarkable with exception of mild tachycardia. EKG shows a left bundle branch block that is reportedly new as per the referring primary care physician. Physical examination grossly unremarkable. CT angio chest negative for pulmonary embolism however shows bilateral pleural effusions right greater than left. proBNP is elevated. Suspect the patient may have had an MI that resulted in the EKG changes as well as development of her new onset CHF. Hemodynamically stable. Normal oxygen saturations on room air not requiring additional support. Admit to medicine    Of note, 1 L IV lactated Ringer's ordered initially as complaint of diarrhea and decreased appetite however patient only received 300 cc of the bolus and it was promptly stopped when her laboratory eval was consistent with congestive heart failure.       ED Medications administered this visit:    Medications   lactated ringers bolus (1,000 mLs IntraVENous New Bag 8/3/22 1726)   iopamidol (ISOVUE-370) 76 % injection 80 mL (80 mLs IntraVENous Given 8/3/22 1638) Procedures: (None if blank)       CLINICAL       1. Pleural effusion    2. New onset of congestive heart failure [facility])          DISPOSITION/PLAN   DISPOSITION Admitted 08/03/2022 06:37:16 PM          (Please note that portions of this note were completed with a voice recognition program.  Efforts were made to edit the dictations but occasionallywords are mis-transcribed. )      Kirill Morton MD (electronically signed)  Attending Physician, Emergency Department         Kirill Morton MD  08/03/22 7437

## 2022-08-03 NOTE — ED PROVIDER NOTES
**This is a Medical/PA/APRN Student Note and is charted for educational purposes. The non-physician staff attested note is not to be used for billing purposes, chart documentation or to guide patient care. Please see the supervising physician/PA/APRN modifications/attestation for treatment plan/chart documentation/suggestions. This note has been reviewed and feedback has been provided to the student. **      MEDICAL STUDENT NOTE    Chief Complaint   Patient presents with    Chest Pain     Elevated D Dimer    Shortness of Breath            History obtained from the patient. MARCELINA Sharpe is a 47 y.o. female, with a PMHx of hypothyroidism with c/o elevated D-Dimer per her PCP. Patient has been experiencing dyspnea, dyspepsia, weight gain, and diarrhea \"for a few weeks\". She reports her dyspnea on exertion started on Saturday (7/30/22) accompanied with colicky, pleuritic chest pain. She also states that her sleep is interrupted by sudden onset of dyspnea, she has been having difficulty lying supine, and has complaints of bilateral lower leg edema. Patient visited her PCP today and was advised to visit the ED after resulting an elevated D-Dimer and new changes on her EKG compared to one last year. Review of Systems   Constitutional:  Positive for appetite change (decreased) and unexpected weight change (10 lbs weight gain in 2 weeks). HENT: Negative. Eyes: Negative. Respiratory:  Positive for apnea, cough (dry) and shortness of breath. Cardiovascular:  Positive for chest pain (pleuritic) and leg swelling (bilaterally). Gastrointestinal:  Positive for abdominal distention and diarrhea. Endocrine: Negative. Genitourinary: Negative. Musculoskeletal: Negative. Skin: Negative. Allergic/Immunologic: Negative. Neurological: Negative. Hematological: Negative. Psychiatric/Behavioral: Negative.            Past Medical History:   Diagnosis Date    Depression Hypercholesterolemia     Hypothyroidism           Past Surgical History:   Procedure Laterality Date    ABLATION OF DYSRHYTHMIC FOCUS      BREAST SURGERY  2007    implant bilateral breast    TONSILLECTOMY      TUBAL LIGATION           Current Facility-Administered Medications:     lactated ringers bolus, 1,000 mL, IntraVENous, Once, Rob Ponce MD, Last Rate: 1,000 mL/hr at 08/03/22 1726, 1,000 mL at 08/03/22 1726    Current Outpatient Medications:     amphetamine-dextroamphetamine (ADDERALL XR) 15 MG extended release capsule, Take 1 capsule by mouth every morning for 30 days. , Disp: 30 capsule, Rfl: 0    levothyroxine (SYNTHROID) 75 MCG tablet, TAKE 1 TABLET DAILY, Disp: 90 tablet, Rfl: 3    venlafaxine (EFFEXOR XR) 150 MG extended release capsule, TAKE 1 CAPSULE ONCE DAILY, Disp: 90 capsule, Rfl: 3    ibuprofen (ADVIL;MOTRIN) 200 MG tablet, Take 200 mg by mouth every 6 hours as needed for Pain, Disp: , Rfl:     acetaminophen (TYLENOL) 500 MG tablet, Take 500 mg by mouth every 6 hours as needed for Pain, Disp: , Rfl:     Social History     Social History Narrative    Not on file     Social History     Tobacco Use    Smoking status: Some Days     Packs/day: 0.10     Years: 20.00     Pack years: 2.00     Types: Cigarettes    Smokeless tobacco: Never   Vaping Use    Vaping Use: Never used   Substance Use Topics    Alcohol use: Yes     Comment: occassionally     Drug use: No        No Known Allergies    Family History   Problem Relation Age of Onset    Cancer Father         Lung Cancer         Previous records reviewed: Santiago Andrade Vitals:    08/03/22 1727   BP:    Pulse: (!) 105   Resp: 15   Temp:    SpO2: 98%     Vital signs and nursing assessment reviewed and abnormal from baseline, patient is tachy . Pulsoximetry is normal per my interpretation. Physical Exam  Constitutional:       General: She is not in acute distress. Appearance: She is well-developed.    HENT:      Head: Normocephalic and atraumatic. Eyes:      Pupils: Pupils are equal, round, and reactive to light. Comments: Positive for nystagmus   Cardiovascular:      Rate and Rhythm: Regular rhythm. Tachycardia present. Heart sounds: No murmur heard. No friction rub. No gallop. Pulmonary:      Effort: Tachypnea present. No respiratory distress. Breath sounds: Examination of the right-lower field reveals decreased breath sounds. Decreased breath sounds present. No wheezing, rhonchi or rales. Chest:      Chest wall: No tenderness. Abdominal:      Palpations: Abdomen is soft. Tenderness: There is no abdominal tenderness. There is no guarding or rebound. Musculoskeletal:         General: Normal range of motion. Cervical back: Neck supple. Right lower le+ Pitting Edema present. Left lower le+ Pitting Edema present. Skin:     General: Skin is warm and dry. Neurological:      General: No focal deficit present. Mental Status: She is alert. Psychiatric:         Mood and Affect: Mood normal.           MDM  Initial Assessment: Patient is a 46 y/o female with a 3-4 week history of dyspnea on exertion sent to the ED by her PCP for elevated D-dimer and a new left BBB on EKG when compared to a previous EKG taken performed last year. The patient is not in any distress, is mildly tachypnic, is slightly tachycardic, but shows now apparent signs of hypoxia. Patient's CT angiogram is positive for bilateral pleural effusions, the right being more significant than the left. Patient's trop is negative and has an elevated BNP. It is likely that the patient experienced an MI 3-4 weeks ago which resulted in a LBBB and new onset heart failure. The patient is hemodynamically stable and in no apparent distress. She will be admitted for continuation of medical therapy.      Plan:   EKG  Labs  CBC  BMP  BNP  D-Dimer  Trop  CTA Chest w/wo contrast  Admit   Case and management plan discussed with  Tee. Labs Reviewed   CBC WITH AUTO DIFFERENTIAL - Abnormal; Notable for the following components:       Result Value    Hematocrit 48.2 (*)     RDW-SD 47.1 (*)     All other components within normal limits   COMPREHENSIVE METABOLIC PANEL W/ REFLEX TO MG FOR LOW K - Abnormal; Notable for the following components:    Glucose 133 (*)     CO2 21 (*)     All other components within normal limits   D-DIMER, QUANTITATIVE - Abnormal; Notable for the following components:    D-Dimer, Quant 1387.00 (*)     All other components within normal limits   BRAIN NATRIURETIC PEPTIDE - Abnormal; Notable for the following components:    Pro-BNP 11621.0 (*)     All other components within normal limits   GLOMERULAR FILTRATION RATE, ESTIMATED - Abnormal; Notable for the following components:    Est, Glom Filt Rate 66 (*)     All other components within normal limits   OSMOLALITY - Abnormal; Notable for the following components:    Osmolality Calc 274.3 (*)     All other components within normal limits   TROPONIN   ANION GAP       Medications   lactated ringers bolus (1,000 mLs IntraVENous New Bag 8/3/22 1726)   iopamidol (ISOVUE-370) 76 % injection 80 mL (80 mLs IntraVENous Given 8/3/22 1638)       CTA CHEST W WO CONTRAST   Final Result      1. No evidence of pulmonary emboli. 2. Mild cardiomegaly. 3. Moderate right and small left pleural effusions. 4. Areas of atelectasis or infiltrate in the right and left lower lobes. 5. Bilateral breast implants in place. **This report has been created using voice recognition software. It may contain minor errors which are inherent in voice recognition technology. **      Final report electronically signed by DR Caleb Valdivia on 8/3/2022 5:00 PM          Final diagnoses:   None       New Prescriptions    No medications on file           Condition: condition: fair    Disposition: Admit to telemetry    Electronically signed by Norman Villagran on 8/3/2022 at 5:57 PM       **This is

## 2022-08-03 NOTE — TELEPHONE ENCOUNTER
Patient notified of results and RAR's recommendations. She will go to ED to be evaluated this afternoon.

## 2022-08-03 NOTE — TELEPHONE ENCOUNTER
----- Message from Fredy Goodman MD sent at 8/3/2022  2:54 PM EDT -----  Notify just received the d dimer back, it is high.   Recommend see go to ED for further testing to rule PE.

## 2022-08-03 NOTE — PROGRESS NOTES
Lisa Ivan (:  1970) is a 47 y.o. female,Established patient, here for evaluation of the following chief complaint(s):  Shortness of Breath (Swelling in ankles, heartburn-taking Omeprazole)         ASSESSMENT/PLAN:  1. SOB (shortness of breath) on exertion  -     EKG 12 Lead  -     CBC with Auto Differential; Future  -     Basic Metabolic Panel; Future  -     D-Dimer, Quantitative; Future  -     Cassie Marx MD, Cardiology, [facility]  -ron crockett, EKG reviewed and is abnormal.  Had normal EKG last year at [facility] ED, urgent referral to cardiology, reviewed blood work from 2022, will add above. 2. Peripheral edema  -     CBC with Auto Differential; Future  -     Basic Metabolic Panel; Future  -     Cassie Marx MD, Cardiology, [facility]  -unknown diagnosis/prognosis, possible heart related. 3. Weight gain  -     CBC with Auto Differential; Future  -     Basic Metabolic Panel; Future  -likely due to water retention  4. Abnormal EKG  -     Cassie Marx MD, Cardiology, [facility]  -ron crockett, EKG reviewed and is abnormal.  Had normal EKG last year at [facility] ED, urgent referral to cardiology, reviewed blood work from 2022. No follow-ups on file. Subjective   SUBJECTIVE/OBJECTIVE:  Shortness of Breath  Associated symptoms include leg swelling. Pertinent negatives include no abdominal pain, chest pain, ear pain, fever, headaches, neck pain, rash, rhinorrhea, sore throat, vomiting or wheezing. Patient here today for a check up. Reviewed BMI of 30. Encouraged diet, exercise and weight loss. 1 week of increased SOB. Chest pressure, bloating. Ankle swelling. GERD. Weight gain. No new meds. , current smoker, pmh reviewed. Review of Systems   Constitutional:  Positive for unexpected weight change. Negative for chills, fatigue and fever. HENT:  Negative for congestion, ear pain, rhinorrhea and sore throat. Eyes:  Negative for pain and visual disturbance. Respiratory:  Positive for shortness of breath. Negative for cough, chest tightness and wheezing. Cardiovascular:  Positive for leg swelling. Negative for chest pain and palpitations. Gastrointestinal:  Positive for abdominal distention. Negative for abdominal pain, blood in stool, constipation, diarrhea, nausea and vomiting. Genitourinary:  Negative for difficulty urinating, frequency, hematuria and urgency. Musculoskeletal:  Negative for back pain, joint swelling, myalgias and neck pain. Skin:  Negative for rash. Neurological:  Negative for dizziness and headaches. Hematological:  Negative for adenopathy. Does not bruise/bleed easily. Psychiatric/Behavioral:  Negative for behavioral problems and sleep disturbance. The patient is not nervous/anxious. Objective   Physical Exam  Vitals and nursing note reviewed. Constitutional:       Appearance: She is well-developed. HENT:      Head: Normocephalic and atraumatic. Right Ear: External ear normal.      Left Ear: External ear normal.      Nose: Nose normal.      Mouth/Throat:      Mouth: Mucous membranes are moist.   Eyes:      Pupils: Pupils are equal, round, and reactive to light. Neck:      Thyroid: No thyromegaly. Cardiovascular:      Rate and Rhythm: Normal rate and regular rhythm. Heart sounds: Normal heart sounds. Pulmonary:      Breath sounds: Normal breath sounds. No wheezing or rales. Abdominal:      General: Bowel sounds are normal.      Palpations: Abdomen is soft. Tenderness: There is no abdominal tenderness. There is no guarding or rebound. Musculoskeletal:         General: Normal range of motion. Cervical back: Neck supple. Right lower le+ Pitting Edema present. Left lower le+ Pitting Edema present. Lymphadenopathy:      Cervical: No cervical adenopathy. Skin:     General: Skin is warm and dry. Findings: No rash.    Neurological:      Mental Status: She is alert and oriented to person, place, and time. Cranial Nerves: No cranial nerve deficit. Deep Tendon Reflexes: Reflexes are normal and symmetric. An electronic signature was used to authenticate this note.     --Lucrecia Ryan MD

## 2022-08-04 ENCOUNTER — APPOINTMENT (OUTPATIENT)
Dept: CARDIAC CATH/INVASIVE PROCEDURES | Age: 52
DRG: 286 | End: 2022-08-04
Payer: COMMERCIAL

## 2022-08-04 PROBLEM — I50.9 NEW ONSET OF CONGESTIVE HEART FAILURE (HCC): Status: ACTIVE | Noted: 2022-08-04

## 2022-08-04 PROBLEM — J90 PLEURAL EFFUSION: Status: ACTIVE | Noted: 2022-08-04

## 2022-08-04 LAB
ANION GAP SERPL CALCULATED.3IONS-SCNC: 12 MEQ/L (ref 8–16)
BUN BLDV-MCNC: 14 MG/DL (ref 7–22)
CALCIUM SERPL-MCNC: 9.1 MG/DL (ref 8.5–10.5)
CHLORIDE BLD-SCNC: 101 MEQ/L (ref 98–111)
CO2: 23 MEQ/L (ref 23–33)
COLLECTED BY:: ABNORMAL
COLLECTED BY:: ABNORMAL
CREAT SERPL-MCNC: 1.2 MG/DL (ref 0.4–1.2)
GFR SERPL CREATININE-BSD FRML MDRD: 47 ML/MIN/1.73M2
GLUCOSE BLD-MCNC: 132 MG/DL (ref 70–108)
INR BLD: 1.24 (ref 0.85–1.13)
LV EF: 13 %
LVEF MODALITY: NORMAL
POC O2 SATURATION: 56 % (ref 94–97)
POC O2 SATURATION: 87 % (ref 94–97)
POTASSIUM SERPL-SCNC: 4.5 MEQ/L (ref 3.5–5.2)
SODIUM BLD-SCNC: 136 MEQ/L (ref 135–145)
SOURCE, BLOOD GAS: ABNORMAL
SOURCE, BLOOD GAS: ABNORMAL
TROPONIN T: < 0.01 NG/ML

## 2022-08-04 PROCEDURE — 96372 THER/PROPH/DIAG INJ SC/IM: CPT

## 2022-08-04 PROCEDURE — 6360000004 HC RX CONTRAST MEDICATION: Performed by: INTERNAL MEDICINE

## 2022-08-04 PROCEDURE — 99223 1ST HOSP IP/OBS HIGH 75: CPT | Performed by: INTERNAL MEDICINE

## 2022-08-04 PROCEDURE — 84484 ASSAY OF TROPONIN QUANT: CPT

## 2022-08-04 PROCEDURE — 6370000000 HC RX 637 (ALT 250 FOR IP): Performed by: PHYSICIAN ASSISTANT

## 2022-08-04 PROCEDURE — 85610 PROTHROMBIN TIME: CPT

## 2022-08-04 PROCEDURE — 4A023N8 MEASUREMENT OF CARDIAC SAMPLING AND PRESSURE, BILATERAL, PERCUTANEOUS APPROACH: ICD-10-PCS | Performed by: INTERNAL MEDICINE

## 2022-08-04 PROCEDURE — G0378 HOSPITAL OBSERVATION PER HR: HCPCS

## 2022-08-04 PROCEDURE — 93306 TTE W/DOPPLER COMPLETE: CPT

## 2022-08-04 PROCEDURE — 96376 TX/PRO/DX INJ SAME DRUG ADON: CPT

## 2022-08-04 PROCEDURE — C1769 GUIDE WIRE: HCPCS

## 2022-08-04 PROCEDURE — C1894 INTRO/SHEATH, NON-LASER: HCPCS

## 2022-08-04 PROCEDURE — 96375 TX/PRO/DX INJ NEW DRUG ADDON: CPT

## 2022-08-04 PROCEDURE — 2500000003 HC RX 250 WO HCPCS

## 2022-08-04 PROCEDURE — B2111ZZ FLUOROSCOPY OF MULTIPLE CORONARY ARTERIES USING LOW OSMOLAR CONTRAST: ICD-10-PCS | Performed by: INTERNAL MEDICINE

## 2022-08-04 PROCEDURE — 99226 PR SBSQ OBSERVATION CARE/DAY 35 MINUTES: CPT | Performed by: PHYSICIAN ASSISTANT

## 2022-08-04 PROCEDURE — 82810 BLOOD GASES O2 SAT ONLY: CPT

## 2022-08-04 PROCEDURE — 6360000002 HC RX W HCPCS

## 2022-08-04 PROCEDURE — 80048 BASIC METABOLIC PNL TOTAL CA: CPT

## 2022-08-04 PROCEDURE — C1887 CATHETER, GUIDING: HCPCS

## 2022-08-04 PROCEDURE — 2580000003 HC RX 258: Performed by: PHYSICIAN ASSISTANT

## 2022-08-04 PROCEDURE — 6360000002 HC RX W HCPCS: Performed by: PHYSICIAN ASSISTANT

## 2022-08-04 PROCEDURE — 93460 R&L HRT ART/VENTRICLE ANGIO: CPT

## 2022-08-04 PROCEDURE — 93456 R HRT CORONARY ARTERY ANGIO: CPT | Performed by: INTERNAL MEDICINE

## 2022-08-04 PROCEDURE — 36415 COLL VENOUS BLD VENIPUNCTURE: CPT

## 2022-08-04 RX ORDER — FUROSEMIDE 10 MG/ML
20 INJECTION INTRAMUSCULAR; INTRAVENOUS 2 TIMES DAILY
Status: DISCONTINUED | OUTPATIENT
Start: 2022-08-05 | End: 2022-08-07

## 2022-08-04 RX ADMIN — DIPHENHYDRAMINE HYDROCHLORIDE 25 MG: 50 INJECTION, SOLUTION INTRAMUSCULAR; INTRAVENOUS at 21:41

## 2022-08-04 RX ADMIN — SODIUM CHLORIDE, PRESERVATIVE FREE 10 ML: 5 INJECTION INTRAVENOUS at 08:51

## 2022-08-04 RX ADMIN — LEVOTHYROXINE SODIUM 75 MCG: 0.07 TABLET ORAL at 05:59

## 2022-08-04 RX ADMIN — ENOXAPARIN SODIUM 40 MG: 100 INJECTION SUBCUTANEOUS at 21:41

## 2022-08-04 RX ADMIN — FUROSEMIDE 20 MG: 10 INJECTION, SOLUTION INTRAMUSCULAR; INTRAVENOUS at 18:14

## 2022-08-04 RX ADMIN — SODIUM CHLORIDE, PRESERVATIVE FREE 10 ML: 5 INJECTION INTRAVENOUS at 21:42

## 2022-08-04 RX ADMIN — FUROSEMIDE 20 MG: 10 INJECTION, SOLUTION INTRAMUSCULAR; INTRAVENOUS at 08:51

## 2022-08-04 RX ADMIN — VENLAFAXINE HYDROCHLORIDE 150 MG: 150 CAPSULE, EXTENDED RELEASE ORAL at 08:51

## 2022-08-04 RX ADMIN — IOPAMIDOL 50 ML: 755 INJECTION, SOLUTION INTRAVENOUS at 16:48

## 2022-08-04 RX ADMIN — ONDANSETRON 4 MG: 2 INJECTION INTRAMUSCULAR; INTRAVENOUS at 03:50

## 2022-08-04 ASSESSMENT — PAIN SCALES - GENERAL
PAINLEVEL_OUTOF10: 0

## 2022-08-04 NOTE — CONSULTS
[facility]    Patient's Name/Date of Birth: Neo Peaks / 1970 (37 y.o.)    Date: August 4, 2022     Referring Provider: Matilde Cook PA-C    CHIEF COMPLAINT: SOB, LE      HPI: This is a pleasant 47 y.o. female with a PMHx significant for depression, Hypothyroid and hypercholesterolemia who presents to [address] [place] [address] with complaints of LE, orthopnea, BLE swelling and intermittent sharp epigastric pain x2-3 weeks. Symptoms have been getting progressively worse. She is unable to lay flat due to SOB. She says even minor activity exacerbated her SOB however when she rests the symptoms subside. She has had nausea and diarrhea as well. She denies fever, chills, CP, vomiting. Workup revealed elevated BNP with negative troponin and elevated ddimer. CTA chest was negative for PE but did show muild cardiomegaly, b/l pleural effusions. EKG showed sinus tachy, right axis deviation and intraventricular conduction delay. No acute ischemic changes were identified. Echo showed preliminary EF ~10%. She quit drinking 1 month ago but previously would have 3 drinks per week. Previous smoker of 1 pack per week. She has no other acute complaints    Echo: No results found for this or any previous visit.        All labs, EKG's, diagnostic testing and images as well as cardiac cath, stress testing were reviewed during this encounter    Past Medical History:   Diagnosis Date    Depression     Hypercholesterolemia     Hypothyroidism      Past Surgical History:   Procedure Laterality Date    ABLATION OF DYSRHYTHMIC FOCUS      BREAST SURGERY  2007    implant bilateral breast    TONSILLECTOMY      TUBAL LIGATION       Current Facility-Administered Medications   Medication Dose Route Frequency Provider Last Rate Last Admin    levothyroxine (SYNTHROID) tablet 75 mcg  75 mcg Oral Daily Mariama Alvarez PA-C   75 mcg at 08/04/22 0559    venlafaxine (EFFEXOR XR) extended release capsule 150 mg  150 mg Oral differently: 2 times daily TAKE 1 CAPSULE in morning 1 capsule in the evening 6/13/22   Fredy Goodman MD   ibuprofen (ADVIL;MOTRIN) 200 MG tablet Take 200 mg by mouth every 6 hours as needed for Pain    Historical Provider, MD   acetaminophen (TYLENOL) 500 MG tablet Take 500 mg by mouth every 6 hours as needed for Pain    Historical Provider, MD   Scheduled Meds:   levothyroxine  75 mcg Oral Daily    venlafaxine  150 mg Oral Daily with breakfast    sodium chloride flush  10 mL IntraVENous 2 times per day    enoxaparin  40 mg SubCUTAneous Q24H    furosemide  20 mg IntraVENous BID    [Held by provider] metoprolol tartrate  25 mg Oral BID     Continuous Infusions:   sodium chloride       PRN Meds:.sodium chloride flush, sodium chloride, ondansetron **OR** ondansetron, polyethylene glycol, acetaminophen **OR** acetaminophen, nitroGLYCERIN, diphenhydrAMINE    No Known Allergies  Family History   Problem Relation Age of Onset    Cancer Father         Lung Cancer     Social History     Socioeconomic History    Marital status:      Spouse name: Not on file    Number of children: Not on file    Years of education: Not on file    Highest education level: Not on file   Occupational History    Not on file   Tobacco Use    Smoking status: Some Days     Packs/day: 0.10     Years: 20.00     Pack years: 2.00     Types: Cigarettes    Smokeless tobacco: Never   Vaping Use    Vaping Use: Never used   Substance and Sexual Activity    Alcohol use: Yes     Comment: occassionally     Drug use: No    Sexual activity: Not on file   Other Topics Concern    Not on file   Social History Narrative    Not on file     Social Determinants of Health     Financial Resource Strain: Low Risk     Difficulty of Paying Living Expenses: Not hard at all   Food Insecurity: No Food Insecurity    Worried About Running Out of Food in the Last Year: Never true    Ran Out of Food in the Last Year: Never true   Transportation Needs: Not on file Physical Activity: Not on file   Stress: Not on file   Social Connections: Not on file   Intimate Partner Violence: Not on file   Housing Stability: Not on file     ROS:   Constitutional: Denies any recent wt change. Eyes:  Denies any blurring or double vision, no glaucoma  Ears/Nose/Mouth/Throat:  Denies any chronic sinus/rhinitis, bleeding gums  Cardiovascular:  As described above. Respiratory:  Denies any frequent cough, wheezing or coughing up blood  Genitourinary:  Denies difficulty with urination and kidney stones  Gastrointestinal:  Intermittent epigastric pain, nausea and diarrhea  Musculoskeletal:  Denies any joint pain, back pain, or difficulty walking  Integumentary:  Denies any rash  Neurological:  No numbness or tingling  Endocrine:  Denies any polydipsia. Hematologic/Lymphatic:  Denies any hemorrhage or lymphatic drainage problems. Labs:  CBC:   Recent Labs     08/03/22  1101 08/03/22  1610   WBC 8.8 8.0   HGB 15.8 15.6   HCT 49.3* 48.2*   MCV 94.6 94.7    205     BMP:   Recent Labs     08/03/22  1101 08/03/22  1610 08/04/22  0515    136 136   K 4.7 3.8 4.5    100 101   CO2 19* 21* 23   BUN 14 14 14   CREATININE 1.0 0.9 1.2     Accucheck Glucoses: No results for input(s): POCGLU in the last 72 hours. Cardiac Enzymes:   Recent Labs     08/04/22  0025 08/04/22  0515 08/04/22  0548   TROPONINT < 0.010 < 0.010 < 0.010     PT/INR: No results for input(s): PROTIME, INR in the last 72 hours. APTT: No results for input(s): APTT in the last 72 hours.   Liver Profile:  Lab Results   Component Value Date/Time    AST 32 08/03/2022 04:10 PM    ALT 45 08/03/2022 04:10 PM    BILIDIR 0.2 10/19/2011 08:05 AM    BILITOT 1.1 08/03/2022 04:10 PM    ALKPHOS 118 08/03/2022 04:10 PM     Lab Results   Component Value Date/Time    CHOL 206 06/13/2022 11:57 AM    HDL 62 06/13/2022 11:57 AM    TRIG 116 06/13/2022 11:57 AM     TSH:   Lab Results   Component Value Date/Time    TSH 2.100 06/13/2022 11:57 AM     UA: No results found for: NITRITE, COLORU, PHUR, LABCAST, WBCUA, RBCUA, MUCUS, TRICHOMONAS, YEAST, BACTERIA, CLARITYU, SPECGRAV, LEUKOCYTESUR, UROBILINOGEN, BILIRUBINUR, BLOODU, GLUCOSEU, AMORPHOUS      Physical Exam:  Vitals:    08/04/22 0812   BP: (!) 138/100   Pulse:    Resp:    Temp:    SpO2:       Intake/Output Summary (Last 24 hours) at 8/4/2022 8030  Last data filed at 8/4/2022 0423  Gross per 24 hour   Intake 100 ml   Output 1000 ml   Net -900 ml      General:  No acute distress  Neck: Supple, no JVD, no carotid bruits  Heart: Regular rhythm normal S1 and S2, no rubs, murmurs or gallops  Lungs: clear to ascultation no rales, wheezes, or rhonchi  Abdomen: positive bowel sounds, soft, non-tender, obese, non-distended, no bruits, no masses  Extremities:no clubbing, cyanosis or edema  Neurologic: alert and oriented x 3, cranial nerves 2-12 grossly intact, motor and sensory intact, moving all extremities  Skin: No rashes  Psych: AO x 3, no depression/edil, no pressured speech, normal affect  Lymph: No obvious LAD      Assessment:  Acute systolic heart failure, NYHA Class IV  Hypertension  Hypothyroid    Plan:  New onset HFrEF - Prelim EF ~10% on Echo 8/2022. Responded well to Diuresis. Patient will need ischemic evaluation. Cr 1.2. INR ordered  Keep NPO. Daily wts, Strict I&Os  LHC/RHC for further evaluation  Lifevest on discharge  GDMT prior to discharge with ACE/Arb, BB, Lasix. May consider adding aldactone and SGLT2 if able to tolerate. Further recommendations based on clinical course and findings. HTN - No previous history per review and no home medications. BB was placed on hold due #1. Fairly controlled. Monitor. Thank you for allowing us to participate in the care of this patient. Please do not hesitate to call us with questions.     Case and plan developed in coordination with Dr. Moore Nicely    Electronically signed by Saul Dubose DO on 8/4/2022 at 9:21 AM    Attending Supervising [address]  I performed a history and physical examination on the patient and discussed the management with the resident physician. I reviewed and agree with the findings and plan as documented in the resident's note except for as noted below. New CHF, had GI symptoms coupled with cough, no angina, no TTE showed EF 10-15%. No alcohol use, some smoking, and no major family hx. She has a grand-daughter but she has not been sick. Will need GDMT as above. Plan for cath today. NPO. Will need CMR and Lifevest. Further recommendations based on results and clinical course.       Electronically signed by Sreekanth Kinsey MD on 8/4/22 at 4:14 PM EDT  Interventional Cardiology - [facility]

## 2022-08-04 NOTE — PROGRESS NOTES
Hospitalist Progress Note      Patient:  Anne Farfan    Unit/Bed:8B-25/025-A  YOB: 1970  MRN: [id number]   Acct: [de-identified]   PCP: Rahul Salcido MD  Date of Admission: 8/3/2022    Assessment/Plan:    New onset severe CM / HFrEF: unclear etiology, need to r/o ischemic etiology. Planned LHC/RHC for further evaluation today. Cardiology consulted. GDMT prior to discharge with ACE/Arb, BB, Lasix. May consider adding aldactone and SGLT2 if able to tolerate. ECHO returned with EF 10-15%. Low Na diet / fluid restriction. Strict I/Os. Pt will need LifeVest prior to discharge  Essential HTN: no hx, started on above med regimen, monitor BP closely   Acquired hypothyroidism: Synthroid  Anxiety / depression: Effexor    Chief Complaint: SOB    Initial H and P:-    \"Lisa Palm is a 47 y.o. female with PMHx of depression, hypothyroidism who presented to [facility] with chief complaint of SOB, CP, leg swelling. Patient reports intermittent chest pain and shortness of breath for the past few weeks. Chest pain is described as pressure, worse with exertion, resolves with rest.  Patient also reports shortness of breath with exertion and orthopnea. Patient reports leg swelling that started yesterday. She states she went to her PCP today and had an abnormal EKG and was sent to the ED. Patient reports abdominal pain and nausea which she states are chronic. Patient denies fever/chills, cough, recent illness, vomiting/diarrhea, urinary symptoms. Patient is not having chest pain at this time. Patient admitted to hospitalist service with cardiology consult for further management\"    Subjective (past 24 hours):   8/4: pt sitting up in bed, reports that her b/l LE swelling is all the way up her legs. Denies CP, SOB with exertion only. No abd pain, n/v. Plan Cath today.        Past medical history, family history, social history and allergies reviewed again and is unchanged since admission. ROS (All review of systems completed. Pertinent positives noted. Otherwise All other systems reviewed and negative.)     Medications:  Reviewed    Infusion Medications    sodium chloride       Scheduled Medications    levothyroxine  75 mcg Oral Daily    venlafaxine  150 mg Oral Daily with breakfast    sodium chloride flush  10 mL IntraVENous 2 times per day    enoxaparin  40 mg SubCUTAneous Q24H    furosemide  20 mg IntraVENous BID    [Held by provider] metoprolol tartrate  25 mg Oral BID     PRN Meds: sodium chloride flush, sodium chloride, ondansetron **OR** ondansetron, polyethylene glycol, acetaminophen **OR** acetaminophen, nitroGLYCERIN, diphenhydrAMINE      Intake/Output Summary (Last 24 hours) at 8/4/2022 1055  Last data filed at 8/4/2022 0957  Gross per 24 hour   Intake 100 ml   Output 1900 ml   Net -1800 ml       Diet:  Diet NPO    Exam:  BP (!) 138/100   Pulse 88   Temp 98.5 °F (36.9 °C) (Oral)   Resp 18   Ht 5' 2.7\" (1.593 m)   Wt 169 lb 4.8 oz (76.8 kg)   SpO2 93%   BMI 30.28 kg/m²   General appearance: pleasant, no apparent distress, appears stated age and cooperative. HEENT: Pupils equal, round, and reactive to light. Conjunctivae/corneas clear. Neck: Supple, with full range of motion. No jugular venous distention. Trachea midline. Respiratory:  Normal respiratory effort. Clear to auscultation, bilaterally without Rales/Wheezes/Rhonchi. Cardiovascular: Regular rate and rhythm with normal S1/S2 without murmurs, rubs or gallops. Abdomen: Soft, non-tender, non-distended with normal bowel sounds. Musculoskeletal: passive and active ROM x 4 extremities. + 4 non-pitting edema in b/l LE. Skin: Skin color, texture, turgor normal.  No rashes or lesions. Neurologic:  Neurovascularly intact without any focal sensory/motor deficits.  Cranial nerves: II-XII intact, grossly non-focal.  Psychiatric: Alert and oriented x4, thought content appropriate, normal insight  Capillary Refill: Brisk,< 3 seconds   Peripheral Pulses: +2 palpable, equal bilaterally     Labs:   Recent Labs     08/03/22  1101 08/03/22  1610   WBC 8.8 8.0   HGB 15.8 15.6   HCT 49.3* 48.2*    205     Recent Labs     08/03/22  1101 08/03/22  1610 08/04/22  0515    136 136   K 4.7 3.8 4.5    100 101   CO2 19* 21* 23   BUN 14 14 14   CREATININE 1.0 0.9 1.2   CALCIUM 9.9 9.4 9.1     Recent Labs     08/03/22  1610   AST 32   ALT 45   BILITOT 1.1   ALKPHOS 118     No results for input(s): INR in the last 72 hours. No results for input(s): Adonica Hoose in the last 72 hours. Microbiology:    Blood culture #1: No results found for: BC    Blood culture #2:No results found for: Peder Partida    Organism:No results found for: ORG    No results found for: LABGRAM    MRSA culture only:No results found for: [facility]    Urine culture: No results found for: LABURIN    Respiratory culture: No results found for: CULTRESP    Aerobic and Anaerobic :  No results found for: LABAERO  No results found for: LABANAE    Urinalysis:    No results found for: Saralyn Katz, BACTERIA, RBCUA, BLOODU, [address], GLUCOSEU    Radiology:  CTA [address]   Final Result      1. No evidence of pulmonary emboli. 2. Mild cardiomegaly. 3. Moderate right and small left pleural effusions. 4. Areas of atelectasis or infiltrate in the right and left lower lobes. 5. Bilateral breast implants in place. **This report has been created using voice recognition software. It may contain minor errors which are inherent in voice recognition technology. **      Final report electronically signed by DR Olegario Hernandez on 8/3/2022 5:00 PM        CTA CHEST W WO CONTRAST    Result Date: 8/3/2022  PROCEDURE: CTA CHEST W WO CONTRAST CLINICAL INFORMATION: pulmonary embolism. COMPARISON: No prior study.  TECHNIQUE: 3 mm axial images were obtained through the chest after the administration of IV contrast.  A non-contrast localizer was obtained. 3D reconstructions were performed on the scanner to include MIP coronal and sagittal images through the chest. Isovue was the intravenous contrast utilized. All CT scans at this facility use dose modulation, iterative reconstruction, and/or weight-based dosing when appropriate to reduce radiation dose to as low as reasonably achievable. FINDINGS: There is adequate opacification of the pulmonary arterial system. No pulmonary emboli are present. The aorta is within acceptable limits. There is mild cardiomegaly. . There is no pericardial  effusion. There is a moderate right and small left pleural effusion present. There is no mediastinal, axillary or hilar adenopathy. . There are areas of atelectasis or infiltrate in the right and left lower lobes. No suspicious osseous lesions are present. There are bilateral breast implants present. There are no suspicious findings in the imaged aspects of the upper abdomen. 1. No evidence of pulmonary emboli. 2. Mild cardiomegaly. 3. Moderate right and small left pleural effusions. 4. Areas of atelectasis or infiltrate in the right and left lower lobes. 5. Bilateral breast implants in place. **This report has been created using voice recognition software. It may contain minor errors which are inherent in voice recognition technology. ** Final report electronically signed by DR Luther Jara on 8/3/2022 5:00 PM      Electronically signed by He Whitten PA-C on 8/4/2022 at 10:55 AM

## 2022-08-04 NOTE — H&P
Hospitalist History & Physical    Patient:  Jolynn You    Unit/Bed:8B-25/025-A  YOB: 1970  MRN: [id number]   Acct: [de-identified]   PCP: Yovany Asher MD  Code Status: No Order    Date of Service: Pt seen/examined on 08/03/22 and admitted to Observation with expected LOS less than two midnights due to medical therapy. Chief Complaint: SOB    Assessment/Plan:    Stable angina, LE: Suspect acute CHF. Patient presents with LE, orthopnea, lower extremity edema, chest pain on exertion. BNP elevated, CTA chest shows mild cardiomegaly, bilateral effusions. Troponin is negative, no acute ischemic changes on EKG. Will start patient on IV Lasix. Echo ordered, trend troponin. Consult cardiology for consideration of ischemic workup. Strict I's and O's. N.p.o. at midnight    Hypertension: Not on any home medications. Will add metoprolol for OMT and BP control given #1. Hypothyroidism: Resume home Synthroid, check TSH    Depression: Resume home meds    Obesity: BMI 30.26      History of Present Illness:  Jolynn You is a 47 y.o. female with PMHx of depression, hypothyroidism who presented to [facility] with chief complaint of SOB, CP, leg swelling. Patient reports intermittent chest pain and shortness of breath for the past few weeks. Chest pain is described as pressure, worse with exertion, resolves with rest.  Patient also reports shortness of breath with exertion and orthopnea. Patient reports leg swelling that started yesterday. She states she went to her PCP today and had an abnormal EKG and was sent to the ED. Patient reports abdominal pain and nausea which she states are chronic. Patient denies fever/chills, cough, recent illness, vomiting/diarrhea, urinary symptoms. Patient is not having chest pain at this time. Patient admitted to hospitalist service with cardiology consult for further management      Review of Systems: Pertinent positives as noted in the HPI.  All other systems reviewed and negative. Past Medical History:        Diagnosis Date    Depression     Hypercholesterolemia     Hypothyroidism        Past Surgical History:        Procedure Laterality Date    ABLATION OF DYSRHYTHMIC FOCUS      BREAST SURGERY  2007    implant bilateral breast    TONSILLECTOMY      TUBAL LIGATION         Home Medications:   No current facility-administered medications on file prior to encounter. Current Outpatient Medications on File Prior to Encounter   Medication Sig Dispense Refill    amphetamine-dextroamphetamine (ADDERALL XR) 15 MG extended release capsule Take 1 capsule by mouth every morning for 30 days. 30 capsule 0    levothyroxine (SYNTHROID) 75 MCG tablet TAKE 1 TABLET DAILY 90 tablet 3    venlafaxine (EFFEXOR XR) 150 MG extended release capsule TAKE 1 CAPSULE ONCE DAILY 90 capsule 3    ibuprofen (ADVIL;MOTRIN) 200 MG tablet Take 200 mg by mouth every 6 hours as needed for Pain      acetaminophen (TYLENOL) 500 MG tablet Take 500 mg by mouth every 6 hours as needed for Pain         Allergies:    Patient has no known allergies. Social History:    reports that she has been smoking cigarettes. She has a 2.00 pack-year smoking history. She has never used smokeless tobacco. She reports current alcohol use. She reports that she does not use drugs. Family History:       Problem Relation Age of Onset    Cancer Father         Lung Cancer       Diet:  ADULT DIET; Regular; Low Fat/Low Chol/High Fiber/2 gm Na      Physical Exam:  BP (!) 150/117   Pulse 90   Temp 98.2 °F (36.8 °C) (Oral)   Resp 16   SpO2 98%   General:   Pleasant female. NAD. HEENT:  normocephalic and atraumatic. No scleral icterus. PERR. Neck: supple. No JVD. No thyromegaly. Lungs: clear to auscultation. No retractions  Cardiac: RRR without murmur. Abdomen: soft. Nontender. Bowel sounds positive. Extremities:  No clubbing, cyanosis, or edema x 4. Vasculature: capillary refill < 3 seconds.  Palpable LE pulses bilaterally. Skin:  warm and dry. No rashes or lesions. Psych:  Alert and oriented x3. Affect appropriate  Lymph:  No supraclavicular adenopathy. Neurologic:  No focal deficit. No seizures. Data: (All radiographs, tracings, PFTs, and imaging are personally viewed and interpreted unless otherwise noted)  Labs:   Recent Labs     08/03/22  1101 08/03/22  1610   WBC 8.8 8.0   HGB 15.8 15.6   HCT 49.3* 48.2*    205     Recent Labs     08/03/22  1101 08/03/22  1610    136   K 4.7 3.8    100   CO2 19* 21*   BUN 14 14   CREATININE 1.0 0.9   CALCIUM 9.9 9.4     Recent Labs     08/03/22  1610   AST 32   ALT 45   BILITOT 1.1   ALKPHOS 118     No results for input(s): INR in the last 72 hours. No results for input(s): Noe Shorts in the last 72 hours. Urinalysis:   No results found for: NITRU, WBCUA, BACTERIA, RBCUA, BLOODU, SPECGRAV, GLUCOSEU    EKG:  NSR, left atrial enlargement, intraventricular conduction delay    Radiology:  CTA CHEST W WO CONTRAST   Final Result      1. No evidence of pulmonary emboli. 2. Mild cardiomegaly. 3. Moderate right and small left pleural effusions. 4. Areas of atelectasis or infiltrate in the right and left lower lobes. 5. Bilateral breast implants in place. **This report has been created using voice recognition software. It may contain minor errors which are inherent in voice recognition technology. **      Final report electronically signed by DR Rey Quesada on 8/3/2022 5:00 PM        CTA CHEST W WO CONTRAST    Result Date: 8/3/2022  PROCEDURE: CTA CHEST W WO CONTRAST CLINICAL INFORMATION: pulmonary embolism. COMPARISON: No prior study. TECHNIQUE: 3 mm axial images were obtained through the chest after the administration of IV contrast.  A non-contrast localizer was obtained. 3D reconstructions were performed on the scanner to include MIP coronal and sagittal images through the chest. Isovue was the intravenous contrast utilized. All CT scans at this facility use dose modulation, iterative reconstruction, and/or weight-based dosing when appropriate to reduce radiation dose to as low as reasonably achievable. FINDINGS: There is adequate opacification of the pulmonary arterial system. No pulmonary emboli are present. The aorta is within acceptable limits. There is mild cardiomegaly. . There is no pericardial  effusion. There is a moderate right and small left pleural effusion present. There is no mediastinal, axillary or hilar adenopathy. . There are areas of atelectasis or infiltrate in the right and left lower lobes. No suspicious osseous lesions are present. There are bilateral breast implants present. There are no suspicious findings in the imaged aspects of the upper abdomen. 1. No evidence of pulmonary emboli. 2. Mild cardiomegaly. 3. Moderate right and small left pleural effusions. 4. Areas of atelectasis or infiltrate in the right and left lower lobes. 5. Bilateral breast implants in place. **This report has been created using voice recognition software. It may contain minor errors which are inherent in voice recognition technology. ** Final report electronically signed by DR Bella Lu on 8/3/2022 5:00 PM        Tele:   [x] yes             [] no      Thank you Yakelin Bush MD for the opportunity to be involved in this patient's care.     Electronically signed by Edilberto Lyon PA-C on 8/3/2022 at 8:23 PM

## 2022-08-04 NOTE — CARE COORDINATION
8/4/22, 7:26 AM EDT  DISCHARGE PLANNING EVALUATION:    Lisa Ivan       Admitted: 8/3/2022/ [place] day: 0   Location: 8B-25/025-A Reason for admit: SOB (shortness of breath) [R06.02]   PMH:  has a past medical history of Depression, Hypercholesterolemia, and Hypothyroidism. Procedure: 8-3-22 CTA CHest w/o Contrast.   1. No evidence of pulmonary emboli. 2. Mild cardiomegaly. 3. Moderate right and small left pleural effusions. 4. Areas of atelectasis or infiltrate in the right and left lower lobes. 5. Bilateral breast implants in place. Barriers to Discharge: To ER with SOB. Elevated D-Dimer per PCP. Pt reports wt gain and swelling. Weeks of diarrhea. CTA of chest neg for PE. Note rt pleural effusion and small left pl effusions. Pro BNP 15,987. Cardiology consulted. Echo ordered and completed. IV Lasix. Cardiac Cath planned for today. PCP: Victoria Hodge MD    Patient's Healthcare Decision Maker: Legal Next of Kin    Patient Goals/Plan/Treatment Preferences: Met with Lisa and her spouse this morning. She is from home, she is independent and she works outside the home. She has no home services or DME. She has a PCP, she drives, she is insured and no issues obtaining medications. CM to follow for possible needs. Transportation/Food Security/Housekeeping Addressed:  No issues identified.

## 2022-08-04 NOTE — PROCEDURES
[address] [place] [address]                            CARDIAC CATHETERIZATION    PATIENT NAME: KIKI Harkins                       :        1970  MED REC NO:   [id number]                           ROOM:       0025  ACCOUNT NO:   [de-identified]                           ADMIT DATE: 2022  PROVIDER:     Ld Ansari MD    DATE OF PROCEDURE:  2022    PROCEDURES PERFORMED:  Coronary angiogram, right heart catheterization. INDICATION FOR STUDY:  New-onset LV dysfunction, systolic heart failure. DESCRIPTION OF PROCEDURE:  After written informed consent was obtained,  the patient was brought to the cardiac catheterization laboratory in a  fasting, nonsedated state. Preprocedure timeout was performed. 2%  lidocaine was used to anesthetize the subcutaneous tissue overlying the  right radial artery and right brachial vein. Using ultrasound, we were  able to place a 5-Cymraes sheath in the right brachial vein and then a  6-Cymraes Slender arterial sheath in the right radial artery. Once the  sheath was in place, the radial cocktail was given in the right radial  artery. EQUIPMENT USED:  Standard 5-Cymraes Hernandez catheter, JR4, JL3.5  diagnostic catheters. ESTIMATED BLOOD LOSS:  Less than 30 mL. MEDICATIONS:  Please see EMR. CORONARY ANGIOGRAM:  1. Right coronary artery is a dominant vessel. There is no significant  disease in the proximal, mid, and distal portions of the vessel. 2.  Left main is patent, gives rise to LAD and left circumflex arteries. 3.  Left circumflex artery is patent with no significant disease in the  proximal, mid, and distal portions of the vessel. 4. LAD is patent with no significant disease in the proximal, mid, and  distal portions of the vessel.     RIGHT HEART PRESSURES:  1.  RA is 16.  2.  RV is 55/9, 22.  3.  PA is 53/32, 41.  4.  Wedge pressure is 29.  5.  LVEDP was measured to be 37 mmHg. 6.  AO sat was 87.  7.  PA is 57.  8.  Cardiac output was 2.88 and cardiac index was calculated at 1.6. The patient tolerated the procedure well. There were no immediate  complications. We gave IV Lasix on the table due to elevated LVEDP. She was transferred to her room in stable condition. SUMMARY:  1. Elevated right-sided filling pressures, elevated wedge pressure. 2.  Patent coronaries. RECOMMENDATIONS:  1. Continue IV diuresis. 2.  Monitor hemodynamics closely. 3.  Optimize heart failure therapies. 4. Lifevest on discharge  5.  CHF clinic referral        Aaron Willis MD    D: 08/04/2022 16:57:09       T: 08/04/2022 18:15:17     [id number]  Job#: [id number]     Doc#: [id number]    CC:

## 2022-08-04 NOTE — PROGRESS NOTES
[address] [address]  Sedation/Analgesia History & Physical    Pt Name: Keshawn Nunn  Account number: [de-identified]  MRN: [id number]  YOB: 1970  Provider Performing Procedure: Lynda Ovalles MD MD [facility]  Primary Care Physician: Tyrone Sheikh MD  Date: 8/4/2022    PRE-PROCEDURE    Code Status: FULL CODE  Brief History/Pre-Procedure Diagnosis: LV dysfunction     Consent: : I have discussed with the patient risks, benefits, and alternatives (and relevant risks, benefits, and side effects related to alternatives or not receiving care), and likelihood of the success. The patient and/or representative appear to understand and agree to proceed. The discussion encompasses risks, benefits, and side effects related to the alternatives and the risks related to not receiving the proposed care, treatment, and services. PLANNED PROCEDURE   [x]Cath  [x]PCI                []Pacemaker/AICD  []JUAN             []Cardioversion []Peripheral angiography/PTA  []Other:      VITAL SIGNS   Vitals:    08/04/22 1122   BP: (!) 132/101   Pulse: 83   Resp: 18   Temp: 98.1 °F (36.7 °C)   SpO2: 96%       PHYSICAL:   General: No acute distress  HEENT:  Unremarkable for age  Neck: without increased JVD, carotid pulses 2+ bilaterally without bruits  Heart: RRR, S1 & S2 WNL   Lungs: Clear to auscultation    Abdomen: BS present, without HSM, masses, or tenderness    Extremities: without C,C,E.  Pulses 2+ bilaterally  Mental Status: Alert & Oriented    SEDATION  Planned agent:[x]Midazolam []Meperidine []Sublimaze []Morphine  []Diazepam  [x]Other: fentanyl      ASA Classification:  []1 []2 [x]3 []4 []5  Class 1: A normal healthy patient  Class 2: Pt with mild to moderate systemic disease  Class 3: Severe systemic disease or disturbance  Class 4: Severe systemic disorders that are already life threatening. Class 5: Moribund pt with little chances of survival, for more than 24 hours.   Mallampati I Airway Classification:   []1 []2 [x]3 []4          MEDICAL HISTORY   has a past medical history of Depression, Hypercholesterolemia, and Hypothyroidism. []Additional information:          SURGICAL HISTORY   has a past surgical history that includes Tubal ligation; Breast surgery (2007); ablation of dysrhythmic focus; and Tonsillectomy.   Additional information:       ALLERGIES   Allergies as of 08/03/2022    (No Known Allergies)     Additional information:       MEDICATIONS     Current Facility-Administered Medications:     levothyroxine (SYNTHROID) tablet 75 mcg, 75 mcg, Oral, Daily, Mariama Maritzaggercris, PA-C, 75 mcg at 08/04/22 0559    venlafaxine (EFFEXOR XR) extended release capsule 150 mg, 150 mg, Oral, Daily with breakfast, Mariama Alvarez, PA-C, 150 mg at 08/04/22 0851    sodium chloride flush 0.9 % injection 10 mL, 10 mL, IntraVENous, 2 times per day, [place] [employer], PA-C, 10 mL at 08/04/22 0851    sodium chloride flush 0.9 % injection 10 mL, 10 mL, IntraVENous, PRN, Mariama Dwyerercris, PA-C    0.9 % sodium chloride infusion, , IntraVENous, PRN, Mariama Salasggern, PA-C    enoxaparin (LOVENOX) injection 40 mg, 40 mg, SubCUTAneous, Q24H, Mariama Alvarez, PA-C, 40 mg at 08/03/22 2146    ondansetron (ZOFRAN-ODT) disintegrating tablet 4 mg, 4 mg, Oral, Q8H PRN **OR** ondansetron (ZOFRAN) injection 4 mg, 4 mg, IntraVENous, Q6H PRN, Mariama Dwyerern, PA-C, 4 mg at 08/04/22 0350    polyethylene glycol (GLYCOLAX) packet 17 g, 17 g, Oral, Daily PRN, Mariama Alvarez, PA-C    acetaminophen (TYLENOL) tablet 650 mg, 650 mg, Oral, Q6H PRN **OR** acetaminophen (TYLENOL) suppository 650 mg, 650 mg, Rectal, Q6H PRN, Mariama Alvarez, PA-C    furosemide (LASIX) injection 20 mg, 20 mg, IntraVENous, BID, Mariama Alvarez PA-C, 20 mg at 08/04/22 0851    [Held by provider] metoprolol tartrate (LOPRESSOR) tablet 25 mg, 25 mg, Oral, BID, Mariama Alvarez PA-C, 25 mg at 08/03/22 5472    nitroGLYCERIN (NITROSTAT) SL tablet 0.4 mg, 0.4 mg, SubLINGual, Q5 Min PRN, Tomás Gray PA-C    diphenhydrAMINE (BENADRYL) injection 25 mg, 25 mg, IntraVENous, Nightly PRN, Tomás Gray PA-C  Prior to Admission medications    Medication Sig Start Date End Date Taking? Authorizing Provider   amphetamine-dextroamphetamine (ADDERALL XR) 15 MG extended release capsule Take 1 capsule by mouth every morning for 30 days.   Patient not taking: Reported on 8/3/2022 7/29/22 8/28/22  Nneka Fine MD   levothyroxine (SYNTHROID) 75 MCG tablet TAKE 1 TABLET DAILY 6/13/22   Nneka Fine MD   venlafaxine (EFFEXOR XR) 150 MG extended release capsule TAKE 1 CAPSULE ONCE DAILY  Patient taking differently: 2 times daily TAKE 1 CAPSULE in morning 1 capsule in the evening 6/13/22   Nneka Fine MD   ibuprofen (ADVIL;MOTRIN) 200 MG tablet Take 200 mg by mouth every 6 hours as needed for Pain    Historical Provider, MD   acetaminophen (TYLENOL) 500 MG tablet Take 500 mg by mouth every 6 hours as needed for Pain    Historical Provider, MD     Additional information:                 Hafsa Velázquez MD MD [facility]  Electronically signed 8/4/2022 at 4:10 PM

## 2022-08-04 NOTE — PLAN OF CARE
Problem: Discharge Planning  Goal: Discharge to home or other facility with appropriate resources  Outcome: Progressing  Flowsheets (Taken 8/3/2022 2016)  Discharge to home or other facility with appropriate resources: Identify barriers to discharge with mahendra  Problem: Pain  Goal: Verbalizes/displays adequate comfort level or baseline comfort level  Outcome: Progressing     Problem: Safety - Adult  Goal: Free from fall injury  Outcome: Progressing     Problem: Cardiovascular - Adult  Goal: Maintains optimal cardiac output and hemodynamic stability  Outcome: Progressing  Goal: Absence of cardiac dysrhythmias or at baseline  Outcome: Progressing   ent and caregiver

## 2022-08-04 NOTE — PROGRESS NOTES
Notified Morenita MILIAN concerning patient elevated blood pressure of 138/100 manually. Orders to monitor.

## 2022-08-05 LAB
ANION GAP SERPL CALCULATED.3IONS-SCNC: 14 MEQ/L (ref 8–16)
BUN BLDV-MCNC: 16 MG/DL (ref 7–22)
C-REACTIVE PROTEIN: 3.01 MG/DL (ref 0–1)
CALCIUM SERPL-MCNC: 9.1 MG/DL (ref 8.5–10.5)
CHLORIDE BLD-SCNC: 100 MEQ/L (ref 98–111)
CO2: 21 MEQ/L (ref 23–33)
CREAT SERPL-MCNC: 1 MG/DL (ref 0.4–1.2)
ERYTHROCYTE [DISTWIDTH] IN BLOOD BY AUTOMATED COUNT: 13.7 % (ref 11.5–14.5)
ERYTHROCYTE [DISTWIDTH] IN BLOOD BY AUTOMATED COUNT: 47 FL (ref 35–45)
GFR SERPL CREATININE-BSD FRML MDRD: 58 ML/MIN/1.73M2
GLUCOSE BLD-MCNC: 113 MG/DL (ref 70–108)
HCT VFR BLD CALC: 49.5 % (ref 37–47)
HEMOGLOBIN: 15.9 GM/DL (ref 12–16)
MCH RBC QN AUTO: 30.3 PG (ref 26–33)
MCHC RBC AUTO-ENTMCNC: 32.1 GM/DL (ref 32.2–35.5)
MCV RBC AUTO: 94.5 FL (ref 81–99)
PLATELET # BLD: 204 THOU/MM3 (ref 130–400)
PMV BLD AUTO: 10.9 FL (ref 9.4–12.4)
POTASSIUM SERPL-SCNC: 3.6 MEQ/L (ref 3.5–5.2)
RBC # BLD: 5.24 MILL/MM3 (ref 4.2–5.4)
SEDIMENTATION RATE, ERYTHROCYTE: 11 MM/HR (ref 0–20)
SODIUM BLD-SCNC: 135 MEQ/L (ref 135–145)
WBC # BLD: 6.7 THOU/MM3 (ref 4.8–10.8)

## 2022-08-05 PROCEDURE — 6370000000 HC RX 637 (ALT 250 FOR IP): Performed by: PHYSICIAN ASSISTANT

## 2022-08-05 PROCEDURE — 6360000002 HC RX W HCPCS: Performed by: PHYSICIAN ASSISTANT

## 2022-08-05 PROCEDURE — 96375 TX/PRO/DX INJ NEW DRUG ADDON: CPT

## 2022-08-05 PROCEDURE — 80048 BASIC METABOLIC PNL TOTAL CA: CPT

## 2022-08-05 PROCEDURE — 2500000003 HC RX 250 WO HCPCS: Performed by: STUDENT IN AN ORGANIZED HEALTH CARE EDUCATION/TRAINING PROGRAM

## 2022-08-05 PROCEDURE — 36415 COLL VENOUS BLD VENIPUNCTURE: CPT

## 2022-08-05 PROCEDURE — 1200000003 HC TELEMETRY R&B

## 2022-08-05 PROCEDURE — 96365 THER/PROPH/DIAG IV INF INIT: CPT

## 2022-08-05 PROCEDURE — 85027 COMPLETE CBC AUTOMATED: CPT

## 2022-08-05 PROCEDURE — 99232 SBSQ HOSP IP/OBS MODERATE 35: CPT | Performed by: STUDENT IN AN ORGANIZED HEALTH CARE EDUCATION/TRAINING PROGRAM

## 2022-08-05 PROCEDURE — 2580000003 HC RX 258: Performed by: PHYSICIAN ASSISTANT

## 2022-08-05 PROCEDURE — 86140 C-REACTIVE PROTEIN: CPT

## 2022-08-05 PROCEDURE — 6370000000 HC RX 637 (ALT 250 FOR IP): Performed by: STUDENT IN AN ORGANIZED HEALTH CARE EDUCATION/TRAINING PROGRAM

## 2022-08-05 PROCEDURE — 85651 RBC SED RATE NONAUTOMATED: CPT

## 2022-08-05 PROCEDURE — 6360000002 HC RX W HCPCS: Performed by: STUDENT IN AN ORGANIZED HEALTH CARE EDUCATION/TRAINING PROGRAM

## 2022-08-05 PROCEDURE — 96366 THER/PROPH/DIAG IV INF ADDON: CPT

## 2022-08-05 PROCEDURE — 96376 TX/PRO/DX INJ SAME DRUG ADON: CPT

## 2022-08-05 RX ORDER — DIGOXIN 0.25 MG/ML
125 INJECTION INTRAMUSCULAR; INTRAVENOUS DAILY
Status: DISCONTINUED | OUTPATIENT
Start: 2022-08-05 | End: 2022-08-06

## 2022-08-05 RX ORDER — METOPROLOL SUCCINATE 25 MG/1
25 TABLET, EXTENDED RELEASE ORAL DAILY
Status: DISCONTINUED | OUTPATIENT
Start: 2022-08-05 | End: 2022-08-07

## 2022-08-05 RX ORDER — NITROGLYCERIN 20 MG/100ML
5-200 INJECTION INTRAVENOUS CONTINUOUS
Status: DISCONTINUED | OUTPATIENT
Start: 2022-08-05 | End: 2022-08-08 | Stop reason: HOSPADM

## 2022-08-05 RX ADMIN — FUROSEMIDE 20 MG: 10 INJECTION, SOLUTION INTRAMUSCULAR; INTRAVENOUS at 18:05

## 2022-08-05 RX ADMIN — METOPROLOL SUCCINATE 25 MG: 25 TABLET, EXTENDED RELEASE ORAL at 10:21

## 2022-08-05 RX ADMIN — FUROSEMIDE 20 MG: 10 INJECTION, SOLUTION INTRAMUSCULAR; INTRAVENOUS at 09:09

## 2022-08-05 RX ADMIN — NITROGLYCERIN 5 MCG/MIN: 20 INJECTION INTRAVENOUS at 04:21

## 2022-08-05 RX ADMIN — NITROGLYCERIN 0.4 MG: 0.4 TABLET, ORALLY DISINTEGRATING SUBLINGUAL at 03:53

## 2022-08-05 RX ADMIN — DIPHENHYDRAMINE HYDROCHLORIDE 25 MG: 50 INJECTION, SOLUTION INTRAMUSCULAR; INTRAVENOUS at 21:32

## 2022-08-05 RX ADMIN — SACUBITRIL AND VALSARTAN 1 TABLET: 24; 26 TABLET, FILM COATED ORAL at 21:21

## 2022-08-05 RX ADMIN — ENOXAPARIN SODIUM 40 MG: 100 INJECTION SUBCUTANEOUS at 21:20

## 2022-08-05 RX ADMIN — SACUBITRIL AND VALSARTAN 1 TABLET: 24; 26 TABLET, FILM COATED ORAL at 16:20

## 2022-08-05 RX ADMIN — SODIUM CHLORIDE, PRESERVATIVE FREE 10 ML: 5 INJECTION INTRAVENOUS at 21:21

## 2022-08-05 RX ADMIN — DIGOXIN 125 MCG: 0.25 INJECTION INTRAMUSCULAR; INTRAVENOUS at 10:22

## 2022-08-05 RX ADMIN — NITROGLYCERIN 0.4 MG: 0.4 TABLET, ORALLY DISINTEGRATING SUBLINGUAL at 04:01

## 2022-08-05 RX ADMIN — LEVOTHYROXINE SODIUM 75 MCG: 0.07 TABLET ORAL at 05:21

## 2022-08-05 RX ADMIN — VENLAFAXINE HYDROCHLORIDE 150 MG: 150 CAPSULE, EXTENDED RELEASE ORAL at 10:21

## 2022-08-05 ASSESSMENT — PAIN - FUNCTIONAL ASSESSMENT
PAIN_FUNCTIONAL_ASSESSMENT: ACTIVITIES ARE NOT PREVENTED

## 2022-08-05 ASSESSMENT — PAIN SCALES - GENERAL
PAINLEVEL_OUTOF10: 0
PAINLEVEL_OUTOF10: 7
PAINLEVEL_OUTOF10: 0
PAINLEVEL_OUTOF10: 2
PAINLEVEL_OUTOF10: 0
PAINLEVEL_OUTOF10: 0
PAINLEVEL_OUTOF10: 7
PAINLEVEL_OUTOF10: 2

## 2022-08-05 ASSESSMENT — PAIN DESCRIPTION - DESCRIPTORS
DESCRIPTORS: ACHING

## 2022-08-05 ASSESSMENT — PAIN DESCRIPTION - FREQUENCY
FREQUENCY: CONTINUOUS
FREQUENCY: CONTINUOUS

## 2022-08-05 ASSESSMENT — PAIN DESCRIPTION - LOCATION
LOCATION: CHEST

## 2022-08-05 ASSESSMENT — PAIN DESCRIPTION - ONSET
ONSET: SUDDEN
ONSET: SUDDEN

## 2022-08-05 ASSESSMENT — PAIN DESCRIPTION - ORIENTATION
ORIENTATION: LEFT

## 2022-08-05 ASSESSMENT — PAIN DESCRIPTION - PAIN TYPE
TYPE: ACUTE PAIN
TYPE: ACUTE PAIN

## 2022-08-05 NOTE — PLAN OF CARE
Problem: Safety - Adult  Goal: Free from fall injury  8/5/2022 0032 by Chadd Bradley RN  Outcome: Progressing    Pt remains free from fall or injury this shift. Pt instructed to use call light when needing assistance. Will continue to monitor.    8/4/2022 1615 by Cookie Gramajo RN  Outcome: Progressing  Flowsheets (Taken 8/4/2022 0532)  Free From Fall Injury: Instruct family/caregiver on patient safety

## 2022-08-05 NOTE — FLOWSHEET NOTE
[place] [address] PROGRESS NOTE      Patient: Anne Farfan  Room #: 8B-25/025-A            YOB: 1970  Age: 47 y.o. Gender: female            Admit Date & Time: 8/3/2022  3:39 PM    Assessment:  Lisa is sitting up in her bed on 8B. Her  is by her side and is her supported and her son walked in during this spiritual care contact. She is Taoism and has been anointed. She talked about her understanding of what is happening with her heart. She stated, Edith Adrian are hoping that medication can strengthen my heart. \"  But she also stated she worries about the outcome and having to wear a vest and her work. Interventions: Active listening, prayer, words of encouragement and humor. Outcomes: Thankful for the spiritual care contact    Plan:     Going home soon. Follow up if she is still here. Care Plan:  Continue spiritual and emotional care for patient and family. Including prayers.       Electronically signed by Myrtle Alexander on 8/5/2022 at 2:03 PM.  [address] [facility]  [phone]

## 2022-08-05 NOTE — PROGRESS NOTES
Cardiology Progress Note      Patient:  Neo Jacobson  YOB: 1970  MRN: [id number]   Acct: [de-identified]  Admit Date:  8/3/2022  Primary Cardiologist:  none  Note per Dr Lena Soto:  \"CHIEF COMPLAINT: SOB, LE        HPI: This is a pleasant 47 y.o. female with a PMHx significant for depression, Hypothyroid and hypercholesterolemia who presents to [facility] with complaints of LE, orthopnea, BLE swelling and intermittent sharp epigastric pain x2-3 weeks. Symptoms have been getting progressively worse. She is unable to lay flat due to SOB. She says even minor activity exacerbated her SOB however when she rests the symptoms subside. She has had nausea and diarrhea as well. She denies fever, chills, CP, vomiting. Workup revealed elevated BNP with negative troponin and elevated ddimer. CTA chest was negative for PE but did show muild cardiomegaly, b/l pleural effusions. EKG showed sinus tachy, right axis deviation and intraventricular conduction delay. No acute ischemic changes were identified. Echo showed preliminary EF ~10%. She quit drinking 1 month ago but previously would have 3 drinks per week. Previous smoker of 1 pack per week. She has no other acute complaints\"    Subjective (Events in last 24 hours):   Pt awake, alert. NAD. Denies cp, sob currently. Feels okay today. Previously she had significant increase in weight despite limited food intake and felt her swelling and chest/abdomen were tighter than normal. D/w patietn about cath precautions including no lifting. D/w patient about cath results and echo. We will order lifevest which she is agreeable to to reduce risk of SCD. We will add medications as tolerated, monitor BP and symptoms while doing so. She will need CHF referral and close follow up.      Objective:   BP (!) 142/100   Pulse 86   Temp 97.8 °F (36.6 °C) (Oral)   Resp 16   Ht 5' 2.7\" (1.593 m)   Wt 160 lb 6.4 oz (72.8 kg)   SpO2 94%   BMI 28.69 kg/m²      Vss, bp is elevated but stable  TELEMETRY: sinus tachy    Physical Exam:  General Appearance: alert and oriented to person, place and time, in no acute distress  Cardiovascular: borderline tachy rate, regular rhythm, normal S1 and S2, no murmurs, rubs, clicks, or gallops, distal pulses intact, no carotid bruits, no JVD  Pulmonary/Chest: clear to auscultation bilaterally- no wheezes, rales or rhonchi, normal air movement, no respiratory distress  Abdomen: soft, non-tender, non-distended, normal bowel sounds, no masses   Extremities: no cyanosis, clubbing, mild bilat LE edema, pulse   Skin: warm and dry, no rash or erythema  Neurological: alert, oriented, normal speech, no focal findings or movement disorder noted  Right radial-no ecchymosis, nontender, NVI   Medications:    digoxin  125 mcg IntraVENous Daily    metoprolol succinate  25 mg Oral Daily    furosemide  20 mg IntraVENous BID    levothyroxine  75 mcg Oral Daily    venlafaxine  150 mg Oral Daily with breakfast    sodium chloride flush  10 mL IntraVENous 2 times per day    enoxaparin  40 mg SubCUTAneous Q24H      nitroGLYCERIN 5 mcg/min (08/05/22 0421)    sodium chloride       sodium chloride flush, 10 mL, PRN  sodium chloride, , PRN  ondansetron, 4 mg, Q8H PRN   Or  ondansetron, 4 mg, Q6H PRN  polyethylene glycol, 17 g, Daily PRN  acetaminophen, 650 mg, Q6H PRN   Or  acetaminophen, 650 mg, Q6H PRN  nitroGLYCERIN, 0.4 mg, Q5 Min PRN  diphenhydrAMINE, 25 mg, Nightly PRN        Diagnostics:  EKG:     Echo:   Conclusions      Summary   Moderately dilated left ventricular size and severely reduce systolic   function. There was severe global hypokinesis. Wall thickness was within normal limits. Ejection fraction was estimated at 10-15%. The right ventricular size was normal with moderately reduced systolic   function and normal wall thickness. There was trace mitral regurgitation. There was mild tricuspid regurgitation.  Assuming RAP = 10 mmHg, the   estimated RVSP = 29 mmHg.   IVC size is dilated with reduced respiratory phasic changes (CVP~5-10   mmHg)      Signature      ----------------------------------------------------------------   Electronically signed by Bev Santoro MD (Interpreting   physician) on 08/04/2022  Stress:     Left Heart Cath:   CORONARY ANGIOGRAM:  1. Right coronary artery is a dominant vessel. There is no significant  disease in the proximal, mid, and distal portions of the vessel. 2.  Left main is patent, gives rise to LAD and left circumflex arteries. 3.  Left circumflex artery is patent with no significant disease in the  proximal, mid, and distal portions of the vessel. 4. LAD is patent with no significant disease in the proximal, mid, and  distal portions of the vessel. RIGHT HEART PRESSURES:  1.  RA is 16.  2.  RV is 55/9, 22.  3.  PA is 53/32, 41.  4.  Wedge pressure is 29.  5.  LVEDP was measured to be 37 mmHg. 6.  AO sat was 87.  7.  PA is 57.  8.  Cardiac output was 2.88 and cardiac index was calculated at 1.6. The patient tolerated the procedure well. There were no immediate  complications. We gave IV Lasix on the table due to elevated LVEDP. She was transferred to her room in stable condition. SUMMARY:  1. Elevated right-sided filling pressures, elevated wedge pressure. 2.  Patent coronaries. RECOMMENDATIONS:  1. Continue IV diuresis. 2.  Monitor hemodynamics closely. 3.  Optimize heart failure therapies. 4.  Continue rest of the management. Concetta Giles MD     D: 08/04/2022  Lab Data:    Cardiac Enzymes:  No results for input(s): CKTOTAL, CKMB, CKMBINDEX, TROPONINI in the last 72 hours.     CBC:   Lab Results   Component Value Date/Time    WBC 6.7 08/05/2022 07:06 AM    RBC 5.24 08/05/2022 07:06 AM    RBC 5.26 04/30/2018 09:52 AM    HGB 15.9 08/05/2022 07:06 AM    HCT 49.5 08/05/2022 07:06 AM     08/05/2022 07:06 AM       CMP:    Lab Results   Component Value Date/Time     08/05/2022 07:06 AM    K 3.6 08/05/2022 07:06 AM    K 3.8 08/03/2022 04:10 PM     08/05/2022 07:06 AM    CO2 21 08/05/2022 07:06 AM    BUN 16 08/05/2022 07:06 AM    CREATININE 1.0 08/05/2022 07:06 AM    LABGLOM 58 08/05/2022 07:06 AM    GLUCOSE 113 08/05/2022 07:06 AM    GLUCOSE 90 04/30/2018 09:52 AM    CALCIUM 9.1 08/05/2022 07:06 AM       Hepatic Function Panel:    Lab Results   Component Value Date/Time    ALKPHOS 118 08/03/2022 04:10 PM    ALT 45 08/03/2022 04:10 PM    AST 32 08/03/2022 04:10 PM    PROT 6.5 08/03/2022 04:10 PM    BILITOT 1.1 08/03/2022 04:10 PM    BILIDIR 0.2 10/19/2011 08:05 AM    LABALBU 4.1 08/03/2022 04:10 PM    LABALBU 4.5 10/19/2011 08:05 AM       Magnesium:  No results found for: MG    PT/INR:    Lab Results   Component Value Date/Time    INR 1.24 08/04/2022 11:09 AM       HgBA1c:  No results found for: LABA1C    FLP:    Lab Results   Component Value Date/Time    TRIG 116 06/13/2022 11:57 AM    HDL 62 06/13/2022 11:57 AM    LDLCALC 121 06/13/2022 11:57 AM    LABVLDL 18 04/30/2018 09:52 AM       TSH:    Lab Results   Component Value Date/Time    TSH 2.100 06/13/2022 11:57 AM         Assessment:  Acute systolic/diastolic CHF/NICMP  EF 02-59%  HTN- running 140/110   Patent coronaries per cath 08/04    D/w Dr Marco Antonio Ramos:  Currently, this 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 Check sed rate and CRP. Daily BMP. Her baseline weight is around 150lbs. She was as high as 165-170 recently. Currently 160, her swelling and breathing are better. Daily weights  2 g sodium restriction daily  2 L fluid restriction daily  Keep K>4, Mg>2    CHF Guidelines  BB-yes. Toprol 25 mg daily. ACE/ARB/Entresto- if BP tolerates addition of toprol, will look to add entresto today. Diuretics-yes. Lasix 20 mg IV BID   Aldactone-no. Consider later  Farxiga-no. Depends on insurance. Add Digoxin . 125 mcg/min    Stop nitro gtt when giving toprol and digoxin.      We will add and titrate CMP meds as able over the next day or 2 with diuresis as tolerated. Recommend cardiac MRI. Can be done at [facility] (closest facility). Cardiology will follow.    Electronically signed by Guillermina Montemayor PA-C on 8/5/2022 at 10:08 AM

## 2022-08-05 NOTE — CARE COORDINATION
8/5/22, 3:33 PM EDT    DISCHARGE PLANNING EVALUATION    SW consult for \"financial\". Briefly spoke with patient and spouse, referred to paperwork that will come with bill. Also phone number will be included for patient accounts.

## 2022-08-05 NOTE — CARE COORDINATION
8/5/22, 10:51 AM EDT    DISCHARGE ON GOING EVALUATION    Lisa [address] day: 0  Location: 8B-25/025-A Reason for admit: SOB (shortness of breath) [R06.02]   Procedure: 8-3-22 CTA CHest w/o Contrast.   1. No evidence of pulmonary emboli. 2. Mild cardiomegaly. 3. Moderate right and small left pleural effusions. 4. Areas of atelectasis or infiltrate in the right and left lower lobes. 5. Bilateral breast implants in place. 8-4-22 Echo with EF 10-15%. Cardiac Cath performed. Barriers to Discharge: Cath performed yesterday. Life Vest order has been placed per provider. Continues on IV Lasix BID. PCP: Nneka Fine MD    Patient Goals/Plan/Treatment Preferences: Plans return home with spouse and new Life Vest.       10:55 Documents including order for Life Vest faxed to [employer]. Phone call placed to Radha Huff advising her of order and forthcoming fax. Vale Ness will be back in touch with this CM later today with status. Transmission of fax confirmed. 1300 Received call from Vale Ness with [employer] stating BC/BS has denied the vest but they do have 72 hours to review data and make further decision. Notified Rima Francois and Tamiko Cavazos. Informed pt as well.

## 2022-08-05 NOTE — PLAN OF CARE
Problem: Discharge Planning  Goal: Discharge to home or other facility with appropriate resources  Outcome: Progressing  Flowsheets (Taken 8/5/2022 0904)  Discharge to home or other facility with appropriate resources: Identify barriers to discharge with patient and caregiver     Problem: Pain  Goal: Verbalizes/displays adequate comfort level or baseline comfort level  Outcome: Progressing  Flowsheets (Taken 8/5/2022 0746)  Verbalizes/displays adequate comfort level or baseline comfort level:   Encourage patient to monitor pain and request assistance   Assess pain using appropriate pain scale     Problem: Safety - Adult  Goal: Free from fall injury  8/5/2022 1139 by Saralee Bence, RN  Outcome: Progressing  Flowsheets (Taken 8/5/2022 0904)  Free From Fall Injury: Instruct family/caregiver on patient safety  8/5/2022 0032 by Letitia Fried RN  Outcome: Progressing     Problem: Cardiovascular - Adult  Goal: Maintains optimal cardiac output and hemodynamic stability  Outcome: Progressing  Flowsheets (Taken 8/5/2022 0904)  Maintains optimal cardiac output and hemodynamic stability:   Monitor blood pressure and heart rate   Monitor urine output and notify Licensed Independent Practitioner for values outside of normal range   Assess for signs of decreased cardiac output  Goal: Absence of cardiac dysrhythmias or at baseline  Outcome: Progressing  Flowsheets (Taken 8/5/2022 0904)  Absence of cardiac dysrhythmias or at baseline:   Monitor cardiac rate and rhythm   Assess for signs of decreased cardiac output

## 2022-08-05 NOTE — DISCHARGE INSTRUCTIONS
F/u with CHF clinic per their schedule (They will call you). F/u with Dr mcneil's office in 3-4 weeks. Our office will call you with cardiac MRI appointment         Continue:  Daily weights and record  Fluid restriction of 2 Liters per day  Limit sodium in diet to around 8094-9427 mg/day  Monitor BP  Activity as tolerated     Call the [facility] for any of the following symptoms: [phone]  Weight gain of 2-3 pounds in 1 day or 5 pounds in 1 week  Increased shortness of breath  Shortness of breath while laying down  Cough  Chest pain  Swelling in feet, ankles or legs  Tenderness or bloating in the abdomen  Fatigue   Decreased appetite or nausea   Confusion     Discharge Instructions for Radial Heart Catherization    1. Take it easy for 3-4 days. 2.  No driving for 2 days. 3.  No lifting of 5 lbs or more for 5 days with the affected arm. 4.  Can shower after 24 hours. 5.  Remove arm board after 24 hours. 6.  Apply a band aid to the insertion site daily for 5 days. May apply antibiotic ointment if desired, but not necessary. Wash site daily with soap and water. 7.  No creams, ointments, or powders near the insertion site. 8.  No tub baths, swimming, hot tubs, or hand washing dishes for 1 week. 9.  Watch for signs of infection (redness, warmth, swelling, or pus drainage) or coolness of extremity and call physician if this occurs  10. If bleeding occurs from insertion site, apply pressure and call 911. Heart Failure: Care Instructions  Your Care Instructions     Heart failure occurs when your heart does not pump as much blood as the body needs. Failure does not mean that the heart has stopped pumping but rather that it is not pumping as well as it should. Over time, this causes fluid buildup in your lungs and other parts of your body. Fluid buildup can cause shortness of breath, fatigue, swollen ankles, and other problems.  By taking medicines regularly, reducing sodium (salt) in your diet, checking your weight every day,and making lifestyle changes, you can feel better and live longer. Follow-up care is a key part of your treatment and safety. Be sure to make and go to all appointments, and call your doctor if you are having problems. It's also a good idea to know your test results and keep alist of the medicines you take. How can you care for yourself at home? Medicines    Be safe with medicines. Take your medicines exactly as prescribed. Call your doctor if you think you are having a problem with your medicine. Do not take any vitamins, over-the-counter medicine, or herbal products without talking to your doctor first. Aide [place] not take ibuprofen (Advil or Motrin) and naproxen (Aleve) without talking to your doctor first. They could make your heart failure worse. You may take some of the following medicine. Angiotensin-converting enzyme inhibitors (ACEIs) or angiotensin II receptor blockers (ARBs) reduce the heart's workload, lower blood pressure, and reduce swelling. Taking an ACEI or ARB may lower your chance of needing to be hospitalized. Beta-blockers can slow heart rate, decrease blood pressure, and improve your condition. Taking a beta-blocker may lower your chance of needing to be hospitalized. Diuretics, also called water pills, reduce swelling. You will get more details on the specific medicines your doctor prescribes. Diet    Your doctor may suggest that you limit sodium. Your doctor can tell you how much sodium is right for you. An example is less than 3,000 mg a day. This includes all the salt you eat in cooking or in packaged foods. People get most of their sodium from processed foods. Fast food and restaurant meals also tend to be very high in sodium. Ask your doctor how much liquid you can drink each day. You may have to limit liquids. Weight    Weigh yourself without clothing at the same time each day. Record your weight.  Call your doctor if you have a sudden weight gain, such as more than 2 to 3 pounds in a day or 5 pounds in a week. (Your doctor may suggest a different range of weight gain.) A sudden weight gain may mean that your heart failure is getting worse. Activity level    Start light exercise (if your doctor says it is okay). Even if you can only do a small amount, exercise will help you get stronger, have more energy, and manage your weight and your stress. Walking is an easy way to get exercise. Start out by walking a little more than you did before. Bit by bit, increase the amount you walk. When you exercise, watch for signs that your heart is working too hard. You are pushing yourself too hard if you cannot talk while you are exercising. If you become short of breath or dizzy or have chest pain, stop, sit down, and rest.     If you feel \"wiped out\" the day after you exercise, walk slower or for a shorter distance until you can work up to a better pace. Get enough rest at night. Sleeping with 1 or 2 pillows under your upper body and head may help you breathe easier. Lifestyle changes    Do not smoke. Smoking can make a heart condition worse. If you need help quitting, talk to your doctor about stop-smoking programs and medicines. These can increase your chances of quitting for good. Quitting smoking may be the most important step you can take to protect your heart. Limit alcohol to 2 drinks a day for men and 1 drink a day for women. Too much alcohol can cause health problems. Avoid getting sick from colds and the flu. Get a pneumococcal vaccine shot. If you have had one before, ask your doctor whether you need another dose. Get a flu shot each year. If you must be around people with colds or the flu, wash your hands often. When should you call for help? Call 911  if you have symptoms of sudden heart failure such as: You have severe trouble breathing. You cough up pink, foamy mucus.      You have a new irregular or rapid heartbeat. Call your doctor now or seek immediate medical care if:    You have new or increased shortness of breath. You are dizzy or lightheaded, or you feel like you may faint. You have sudden weight gain, such as more than 2 to 3 pounds in a day or 5 pounds in a week. (Your doctor may suggest a different range of weight gain.)     You have increased swelling in your legs, ankles, or feet. You are suddenly so tired or weak that you cannot do your usual activities. Watch closely for changes in your health, and be sure to contact your doctor ifyou develop new symptoms. Where can you learn more? Go to [email or web address] org and sign in to your [employer] account. Enter F048 in the tibdit box to learn more about \"Heart Failure: Care Instructions. \"     If you do not have an account, please click on the \"Sign Up Now\" link. Current as of: January 10, 2022               Content Version: 13.3  © 2006-2022 [employer]. Care instructions adapted under license by [facility] ([facility]). If you have questions about a medical condition or this instruction, always ask your healthcare professional. [place] [address] any warranty or liability for your use of this information. sacubitril and valsartan  Pronunciation: paulina maier and corrine ART jon  Brand: Entresto  What is the most important information I should know about sacubitril and valsartan? Do not use if you are pregnant, and tell your doctor right away if you become pregnant. If you have diabetes, do not use sacubitril and valsartan together with any medication that containsaliskiren (a blood pressure medicine). What is sacubitril and valsartan? Sacubitril and valsartan are blood pressure medicines. Valsartan is anangiotensin II receptor blocker (sometimes called an ARB). Sacubitril and valsartan is a combination medicine that is used in adults with chronic heart failure.  This medicine helps lower the risk of needing to be hospitalized when symptoms get worse, and helps lower the risk of death fromheart failure. Sacubitril and valsartan is also used to treat heart failure in children whoare at least 3year old. Sacubitril and valsartan is usually given together with other heart medications. Sacubitril and valsartan may also be used for purposes not listed in thismedication guide. What should I discuss with my healthcare provider before taking sacubitril and valsartan? You should not use this medicine if you are allergic to sacubitril or valsartan (Diovan), or if you have ever had a severe allergic reaction to a bloodpressure medication such as:  an ACE inhibitor--benazepril, captopril, enalapril, fosinopril, lisinopril, moexipril, perindopril, quinapril, ramipril, trandolapril (Lotensin, Vasotec, Prinivil, Accupril, Mavik, and others); or  an ARB--azilsartan, candesartan, eprosartan, irbesartan, losartan, olmesartan, telmisartan, valsartan (Atacand, Avapro, Benicar, Diovan, Edarbi, Micardis, Teveten, and others). You should not take sacubitril and valsartan within 36 hours before or after you have taken any ACE inhibitor medication. If you have diabetes, do not use sacubitril and valsartan together with any medication that containsaliskiren (a blood pressure medicine). You may also need to avoid taking sacubitril and valsartan with aliskiren if you have kidney disease. Tell your doctor if you have ever had:  liver disease;  hereditary angioedema; or  if you are on a low-salt-diet. Do not use if you are pregnant, and tell your doctor right away if you become pregnant. Sacubitril and valsartan can cause injury or death to the unborn baby if youtake the medicine during your second or third trimester. You should not breastfeed while using this medicine. How should I take sacubitril and valsartan?   Follow all directions on your prescription label and read all medication guides or instruction sheets. Your doctor may occasionally change your dose. Use themedicine exactly as directed. You may take this medicine with or without food. Sacubitril and valsartan doses are based on weight in children. Your child'sdose needs may change if the child gains or loses weight. If you cannot swallow a tablet whole, a pharmacist can make an oral suspension (liquid). Tell the doctor if theperson taking this medicine has trouble swallowing the tablet. Shake the oral suspension (liquid) before you measure a dose. Use the dosing syringe provided, or use amedicine dose-measuring device (not a kitchen spoon). Your blood pressure will need to be checked often. Your kidney function mayalso need to be checked. Store at room temperature away from moisture and heat. Throw away any oral suspension not used within 15 days after it was mixed. Do not keep the oral suspension melinda refrigerator. What happens if I miss a dose? Take the medicine as soon as you can, but skip the missed dose if it is almost time for your next dose. Do not take two doses at one time. What happens if I overdose? Seek emergency medical attention or call the Poison Help line at 1-956.887.7062. What should I avoid while taking sacubitril and valsartan? Do not use potassium supplements or salt substitutes, unless your doctor hastold you to. Avoid getting up too fast from a sitting or lying position, or you may feeldizzy. What are the possible side effects of sacubitril and valsartan? Get emergency medical help if you have signs of an allergic reaction: hives; difficulty breathing; swelling of your face, lips, tongue, or throat. You may be more likely to have an allergic reaction if you are -American.   Also call your doctor at once if you have:  a light-headed feeling, like you might pass out;  extreme tiredness;  high potassium --slow heart rate, weak pulse, muscle weakness, tingly feeling; or  kidney problems --little or no urination, rapid weight gain, painful or difficult urination, swelling in your hands, feet, or ankles. Common side effects may include:  kidney problems;  high potassium;  dizziness, feeling light-headed; or  cough. This is not a complete list of side effects and others may occur. Call your doctor for medical advice about side effects. You may report side effects toFDA at 1-477-MGZ-0930. What other drugs will affect sacubitril and valsartan? Tell your doctor about all your other medicines, especially:  aliskiren;  lithium;  any other heart or blood pressure medicines;  a diuretic or \"water pill\";  medicine or mineral supplements that contain potassium; or  NSAIDs (nonsteroidal anti-inflammatory drugs) --aspirin, ibuprofen (Advil, Motrin), naproxen (Aleve), celecoxib, diclofenac, indomethacin, meloxicam, and others. This list is not complete. Other drugs may affect sacubitril and valsartan, including prescription and over-the-counter medicines, vitamins, and herbalproducts. Not all possible drug interactions are listed here. Where can I get more information? Your pharmacist can provide more information about sacubitril and valsartan. Remember, keep this and all other medicines out of the reach of children, never share your medicines with others, and use this medication only for the indication prescribed. Every effort has been made to ensure that the information provided by [address] is accurate, up-to-date, and complete, but no guarantee is made to that effect. Drug information contained herein may be time sensitive. [facility] information has been compiled for use by healthcare practitioners and consumers in the [place] and therefore [facility] does not warrant that uses outside of the [place] are appropriate, unless specifically indicated otherwise. [facility]'s drug information does not endorse drugs, diagnose patients or recommend therapy.  [facility]'s drug information is an informational resource designed to assist licensed healthcare practitioners in caring for their patients and/or to serve consumers viewing this service as a supplement to, and not a substitute for, the expertise, skill, knowledge and judgment of healthcare practitioners. The absence of a warning for a given drug or drug combination in no way should be construed to indicate that the drug or drug combination is safe, effective or appropriate for any given patient. [facility] does not assume any responsibility for any aspect of healthcare administered with the aid of information [facility] provides. The information contained herein is not intended to cover all possible uses, directions, precautions, warnings, drug interactions, allergic reactions, or adverse effects. If you have questions about the drugs you are taking, check with yourdoctor, nurse or pharmacist.  Copyright 4092-0195 [address]. Version: 5.01. Revision date: 4/12/2021. Care instructions adapted under license by [facility] ([facility]). If you have questions about a medical condition or this instruction, always ask your healthcare professional. [place] [address] any warranty or liability for your use of this information. metoprolol (oral/injection)  Pronunciation: me TOE pro lol  Brand:  Kapspargo Sprinkle, Lopressor, Metoprolol Succinate ER, Metoprolol Tartrate,Toprol-XL  What is the most important information I should know about metoprolol? You should not use this medicine if you have a serious heart problem (heart block, sick sinus syndrome, slow heart rate), severe circulation problems,severe heart failure, or a history of slow heart beats that caused fainting. What is metoprolol? Metoprolol is a beta-blocker that affects the heart and circulation (blood flowthrough arteries and veins). Metoprolol is used to treat angina (chest pain) and hypertension (high blood pressure).  It is also used to lower your risk of death or needing to behospitalized for heart failure. Metoprolol injection is used during the early phase of a heart attack to lower the risk of death. Metoprolol may also be used for other purposes not listed in this medicationguide. What should I discuss with my healthcare provider before taking metoprolol? You should not use this medicine if you are allergic to metoprolol, or other beta-blockers (atenolol, carvedilol, labetalol, nadolol, nebivolol,propranolol, sotalol, and others), or if you have:  a serious heart problem such as heart block, sick sinus syndrome, or slow heart rate;  severe circulation problems;  severe heart failure (that required you to be in the hospital); or  a history of slow heart beats that have caused you to faint. Tell your doctor if you have ever had:  asthma, chronic obstructive pulmonary disease (COPD), sleep apnea, or other breathing disorder;  diabetes (taking metoprolol may make it harder for you to tell when you have low blood sugar);  liver disease;  congestive heart failure;  problems with circulation (such as Raynaud's syndrome);  a thyroid disorder; or  pheochromocytoma (tumor of the adrenal gland). Do not give this medicine to a child without medical advice. Tell your doctor if you are pregnant or plan to become pregnant. It is not known whether metoprolol will harm an unborn baby. However, having high blood pressure during pregnancy may cause complications such as diabetes or eclampsia (dangerously high blood pressure that can lead to medical problems in both mother and baby). The benefit of treating hypertension may outweigh any risksto the baby. Ask a doctor before using this medicine if you are breast-feeding. Metoprolol can pass into breast milk and may cause dry skin, dry mouth,diarrhea, constipation, or slow heartbeats in your baby. How should I take metoprolol? Follow all directions on your prescription label and read all medication guides or instruction sheets.  Your doctor may occasionally change your dose. Use themedicine exactly as directed. Metoprolol should be taken with a meal or just after a meal.  Take the medicine at the same time each day. Swallow the capsule whole and do not crush, chew, break, or open it. A Toprol XL  tablet can be divided in half if your doctor has told you to do so. Swallowthe half-tablet whole, without chewing or crushing. Measure liquid medicine carefully. Use the dosing syringe provided, or use a medicine dose-measuringdevice (not a kitchen spoon). You will need frequent medical tests, and your blood pressure will need to bechecked often. If you need surgery, tell the surgeon ahead of time that you are usingmetoprolol. You should not stop using metoprolol suddenly. Stopping suddenly may make your condition worse. If you have high blood pressure, keep using this medicine even if you feel well. High blood pressure often has no symptoms. You may need to use metoprolol forthe rest of your life. Store at room temperature away from moisture and heat. Metoprolol injection is given as an infusion into a vein. A healthcare provider will give you this injection in a medical setting where your heart and blood pressure can be monitored. Metoprolol injections are given for only a short time before switching you to the oral form of this medicine. What happens if I miss a dose? Skip the missed dose and use your next dose at the regular time. Do not use two doses at one time. What happens if I overdose? Seek emergency medical attention or call the Poison Help line at 1-412.481.3536. What should I avoid while taking metoprolol? Avoid driving or hazardous activity until you know how this medicine willaffect you. Your reactions could be impaired. Drinking alcohol can increase certain side effects of metoprolol. What are the possible side effects of metoprolol?   Get emergency medical help if you have signs of an allergic reaction: hives; difficulty [place] are appropriate, unless specifically indicated otherwise. [employer]s drug information does not endorse drugs, diagnose patients or recommend therapy. [employer]s drug information is an informational resource designed to assist licensed healthcare practitioners in caring for their patients and/or to serve consumers viewing this service as a supplement to, and not a substitute for, the expertise, skill, knowledge and judgment of healthcare practitioners. The absence of a warning for a given drug or drug combination in no way should be construed to indicate that the drug or drug combination is safe, effective or appropriate for any given patient. [employer] does not assume any responsibility for any aspect of healthcare administered with the aid of information [employer] provides. The information contained herein is not intended to cover all possible uses, directions, precautions, warnings, drug interactions, allergic reactions, or adverse effects. If you have questions about the drugs you are taking, check with yourdoctor, nurse or pharmacist.  Copyright 8443-4115 [employer] [employer]. Version: 17.03. Revision date:5/29/2019. Care instructions adapted under license by [facility] ([facility]). If you have questions about a medical condition or this instruction, always ask your healthcare professional. [place] [address] any warranty or liability for your use of this information. furosemide (oral/injection)  Pronunciation: jaime elizabeth  Brand: Lasix  What is the most important information I should know about furosemide? You should not use this medicine if you are unable to urinate. Do not take more than your recommended dose. High doses of furosemide may cause irreversible hearing loss. What is furosemide? Furosemide is a loop diuretic (water pill) that prevents your body from absorbing too much salt. This allows the salt to instead be passed in yoururine.   Furosemide is used to treat fluid retention (edema) in people with congestiveheart failure, liver disease, or a kidney disorder such as nephrotic syndrome. Furosemide is also used to treat high blood pressure (hypertension). Furosemide may also be used for purposes not listed in this medication guide. What should I discuss with my healthcare provider before taking furosemide? You should not use furosemide if you are allergic to it, or if you are unableto urinate. Tell your doctor if you have ever had:  kidney disease;  enlarged prostate, bladder obstruction, urination problems;  cirrhosis or other liver disease;  an electrolyte imbalance (such as low levels of potassium or magnesium in your blood);  gout;  lupus;  diabetes; or  a sulfa drug allergy. Tell your doctor if you have an MRI (magnetic resonance imaging) or any type of scan using a radioactive dye that is injected into your veins. Both contrastdyes and furosemide can harm your kidneys. It is not known whether this medicine will harm an unborn baby. Tell yourdoctor if you are pregnant or plan to become pregnant. It may not be safe to breastfeed while using this medicine. Ask your doctorabout any risk. Furosemide may slow breast milk production. How should I take furosemide? Follow all directions on your prescription label and read all medication guides or instruction sheets. Your doctor may occasionally change your dose. Use themedicine exactly as directed. Furosemide oral is taken by mouth. Furosemide injection is injected into a muscle or given as an infusion into a vein. A healthcare provider will give you this injection if you are unable to take the medicine bymouth. You may receive your first dose in a hospital or clinic setting if you havesevere liver disease. Do not take more than your recommended dose. High doses of furosemide may cause irreversible hearing loss. Measure liquid medicine carefully.  Use the dosing syringe provided, or use a medicine dose-measuringdevice (not a kitchen spoon). Furosemide doses are based on weight in children. Your child's dose needs maychange if the child gains or loses weight. Furosemide will make you urinate more often and you may get dehydrated easily. Follow your doctor's instructions about using potassium supplements or gettingenough salt and potassium in your diet. Your blood pressure will need to be checked often and you may need othermedical tests. If you have high blood pressure, keep using this medicine even if you feel well. High blood pressure often has no symptoms. You may need to use blood pressuremedicine for the rest of your life. If you need surgery, tell the surgeon ahead of time that you are usingfurosemide. Store at room temperature away from moisture, heat, and light. Throw away any unused oral liquid after 90 days. What happens if I miss a dose? Furosemide is sometimes used only once, so you may not be on a dosing schedule. If you are using the medication regularly, take the medicine as soon as you can, but skip the missed dose if it is almost time for your next dose. Do not take two doses at one time. What happens if I overdose? Seek emergency medical attention or call the Poison Help line at 1-975.863.8321. Overdose symptoms may include feeling very thirsty or hot, heavy sweating, hotand dry skin, extreme weakness, or fainting. What should I avoid while taking furosemide? Avoid getting up too fast from a sitting or lying position, or you may feeldizzy. Avoid becoming dehydrated. Follow your doctor's instructions about the type andamount of liquids you should drink while you are taking furosemide. Drinking alcohol with this medicine can cause side effects. If you have high blood pressure, ask a doctor or pharmacist before taking any medicines that can raise your blood pressure, such as diet pills orcough-and-cold medicine. What are the possible side effects of furosemide?   Get emergency medical help if you have signs of an allergic reaction (hives, difficult breathing, swelling in your face or throat) or a severe skin reaction (fever, sore throat, burning in your eyes, skin pain, red or purple skin rashthat spreads and causes blistering and peeling). Call your doctor at once if you have:  a light-headed feeling, like you might pass out;  ringing in your ears, hearing loss;  muscle spasms or contractions;  pale skin, easy bruising, unusual bleeding;  high blood sugar --increased thirst, increased urination, dry mouth, fruity breath odor;  kidney problems --little or no urination, swelling in your feet or ankles, feeling tired or short of breath;  signs of liver or pancreas problems --loss of appetite, upper stomach pain (that may spread to your back), nausea or vomiting, dark urine, jaundice (yellowing of the skin or eyes); or  signs of an electrolyte imbalance --dry mouth, thirst, weakness, drowsiness, feeling jittery or unsteady, vomiting, irregular heartbeats, fluttering in your chest, numbness or tingling, muscle cramps, muscle weakness or limp feeling. Common side effects may include:  diarrhea, constipation, loss of appetite;  numbness or tingling;  headache, dizziness; or  blurred vision. This is not a complete list of side effects and others may occur. Call your doctor for medical advice about side effects. You may report side effects toFDA at 5-138-DYE-9607. What other drugs will affect furosemide? Sometimes it is not safe to use certain medications at the same time. Some drugs can affect your blood levels of other drugs you take, which mayincrease side effects or make the medications less effective. If you also take sucralfate, take your furosemide dose 2 hours before or 2 hours after you take sucralfate.   Tell your doctor about all your other medicines, especially:  another diuretic, especially ethacrynic acid;  chloral hydrate;  lithium;  phenytoin;  an injected antibiotic;  cancer medicine, such as cisplatin;  heart or blood pressure medicine; or  salicylates such as aspirin, Nuprin Backache Caplet, Kaopectate, KneeRelief, Pamprin Cramp Formula, Pepto-Bismol, Tricosal, Trilisate, and others. This list is not complete. Other drugs may affect furosemide, including prescription and over-the-counter medicines, vitamins, and herbal products. Notall possible drug interactions are listed here. Where can I get more information? Your pharmacist can provide more information about furosemide. Remember, keep this and all other medicines out of the reach of children, never share your medicines with others, and use this medication only for the indication prescribed. Every effort has been made to ensure that the information provided by [facility] is accurate, up-to-date, and complete, but no guarantee is made to that effect. Drug information contained herein may be time sensitive. [facility] information has been compiled for use by healthcare practitioners and consumers in the [facility] and therefore [facility] does not warrant that uses outside of the [facility] are appropriate, unless specifically indicated otherwise. [facility]'s drug information does not endorse drugs, diagnose patients or recommend therapy. [facility] drug information is an informational resource designed to assist licensed healthcare practitioners in caring for their patients and/or to serve consumers viewing this service as a supplement to, and not a substitute for, the expertise, skill, knowledge and judgment of healthcare practitioners. The absence of a warning for a given drug or drug combination in no way should be construed to indicate that the drug or drug combination is safe, effective or appropriate for any given patient. [facility] does not assume any responsibility for any aspect of healthcare administered with the aid of information [facility] provides.  The information contained herein is not intended to cover all possible uses, directions, precautions, warnings, drug interactions, allergic reactions, or adverse effects. If you have questions about the drugs you are taking, check with yourdoctor, nurse or pharmacist.  Copyright 1842-4170 [address]. Version: 16.01. Revision date:5/22/2019. Care instructions adapted under license by [facility] ([facility]). If you have questions about a medical condition or this instruction, always ask your healthcare professional. [place] [address] any warranty or liability for your use of this information. digoxin (oral/injection)  Pronunciation: brittny Kim in  Brand: [address] [facility], Digox, Lanoxin  What is the most important information I should know about digoxin? You should not use digoxin if you have a heart rhythm disorder calledventricular fibrillation. What is digoxin? Digoxin is derived from the leaves of a digitalis plant and is used to treatheart failure. Digoxin is also used to treat atrial fibrillation, a heart rhythm disorder of the atrium (the upper chambers of the heart that allow blood to flow into theheart). Digoxin may also be used for purposes not listed in this medication guide. What should I discuss with my healthcare provider before using digoxin? You should not use digoxin if you are allergic to it, or if you have ventricular fibrillation (a heart rhythm disorder of the ventricles, or lowerchambers of the heart that allow blood to flow out of the heart). Tell your doctor if you have ever had:  a serious heart condition such as \"sick sinus syndrome\" or \"AV block\" (unless you have a pacemaker);  a heart attack;  slow heartbeats that have caused you to faint; Anaid-Parkinson-White Syndrome (sudden fast heartbeats);  kidney disease;  an electrolyte imbalance (such as low levels of calcium, potassium, or magnesium in your blood);  a thyroid disorder; or  if you have recently been sick with vomiting or diarrhea. Tell your doctor if you are pregnant.  It is not known whether digoxin will harm an unborn baby. However, having heart failure or atrial fibrillation during pregnancy may cause complications such as premature birth or low birth weight, or risk of death in both mother and baby. The benefit of treating heartproblems with digoxin may outweigh any risks to the baby. It may not be safe to breast-feed while using this medicine. Ask your doctorabout any risk. How should I use digoxin? Follow all directions on your prescription label and read all medication guidesor instruction sheets. Use the medicine exactly as directed. Try to take oral digoxin at the same time every day. Measure liquid medicine carefully. Use the dosing syringe provided, or use a medicine dose-measuringdevice (not a kitchen spoon). Take digoxin regularly even if you feel fine or have no symptoms. Get yourprescription refilled before you run out of medicine completely. Digoxin injection is given as a shot into a muscle, or as an infusion into a vein. A healthcare provider will give you this injection if you are unable to take the medicine bymouth. Your blood pressure and heart rate will need to be checked daily. You may need frequent blood tests. Your kidney function may also need to bechecked. You should not stop taking digoxin suddenly. Stopping suddenly may make your condition worse. Store at room temperature away from moisture and heat. What happens if I miss a dose? Take the medicine as soon as you can, but skip the missed dose if your next dose is due in less than 12 hours. Do not take two doses at one time. What happens if I overdose? Seek emergency medical attention or call the Poison Help line at 1-661.969.3976. An overdose of digoxin can be fatal.  Overdose symptoms may include nausea, vomiting, loss of appetite, and feelingtired. What should I avoid while using digoxin? Avoid becoming overheated or dehydrated during exercise, in hot weather, or by not drinking enough fluids. Digoxin overdose can occur more easily if you aredehydrated. What are the possible side effects of digoxin? Get emergency medical help if you have signs of an allergic reaction: hives; difficulty breathing; swelling of your face, lips, tongue, or throat. Call your doctor at once if you have:  nausea, vomiting, diarrhea, stomach pain;  fast, slow, or uneven heart rate;  a light-headed feeling, like you might pass out;  bloody or black, tarry stools;  confusion, weakness, hallucinations, unusual thoughts or behavior;  breast swelling or tenderness;  blurred vision, yellowed vision; or  (in babies or children) stomach pain, weight loss, growth delay, behavior changes. Serious side effects may be more likely in older adults and those who are illor debilitated. Common side effects may include:  nausea, diarrhea;  feeling weak or dizzy;  headache, weakness, anxiety, depression; or  rash. This is not a complete list of side effects and others may occur. Call your doctor for medical advice about side effects. You may report side effects toFDA at 5-849-YTM-1303. What other drugs will affect digoxin? Sometimes it is not safe to use certain medications at the same time. Some drugs can affect your blood levels of other drugs you take, which mayincrease side effects or make the medications less effective. Many drugs can affect digoxin. This includes prescription and over-the-counter medicines, vitamins, and herbal products. Not all possible interactions are listed here. Tell your doctor about all your current medicines and any medicine you startor stop using. Where can I get more information? Your pharmacist can provide more information about digoxin. Remember, keep this and all other medicines out of the reach of children, never share your medicines with others, and use this medication only for the indication prescribed.    Every effort has been made to ensure that the information provided by [address] [place] [address] Dr BETTENCOURT ('[employer]') is accurate, up-to-date, and complete, but no guarantee is made to that effect. Drug information contained herein may be time sensitive. [employer] information has been compiled for use by healthcare practitioners and consumers in the [place] and therefore [employer] does not warrant that uses outside of the [place] are appropriate, unless specifically indicated otherwise. [employer]'s drug information does not endorse drugs, diagnose patients or recommend therapy. [employer] drug information is an informational resource designed to assist licensed healthcare practitioners in caring for their patients and/or to serve consumers viewing this service as a supplement to, and not a substitute for, the expertise, skill, knowledge and judgment of healthcare practitioners. The absence of a warning for a given drug or drug combination in no way should be construed to indicate that the drug or drug combination is safe, effective or appropriate for any given patient. [facility] does not assume any responsibility for any aspect of healthcare administered with the aid of information [employer] provides. The information contained herein is not intended to cover all possible uses, directions, precautions, warnings, drug interactions, allergic reactions, or adverse effects. If you have questions about the drugs you are taking, check with yourdoctor, nurse or pharmacist.  Copyright 4534-8987 [address]. Version: 9.01. Revision date:10/23/2018. Care instructions adapted under license by [facility] [address] If you have questions about a medical condition or this instruction, always ask your healthcare professional. [place] [address] any warranty or liability for your use of this information.

## 2022-08-05 NOTE — PROGRESS NOTES
Hospitalist Progress Note      Patient:  Jolynn You    Unit/Bed:8B-25/025-A  YOB: 1970  MRN: [id number]   Acct: [de-identified]   PCP: Yovany Asher MD  Date of Admission: 8/3/2022    Assessment/Plan:    New onset severe CMP / HFrEF: Cardiology consulted. Unclear etiology, ruled out ischemic etiology with LHC/RHC for further evaluation today. Suspect potential viral etiology. CRP / ESR sent. ECHO returned with EF 10-15%. Digoxin 125 mcg started. GDMT with BB / Lasix. If BP tolerates will start Entresto later 8/5. May consider adding aldactone and SGLT2 if able to tolerate later in clinical course. Low Na diet / fluid restriction. Strict I/Os. Daily weights. Pt will need LifeVest prior to discharge  Will need f/u for cardiac  Essential HTN: no hx, started on above med regimen, monitor BP closely   Acquired hypothyroidism: Synthroid  Anxiety / depression: Effexor    Chief Complaint: SOB    Initial H and P:-    \"Lisa Vick is a 47 y.o. female with PMHx of depression, hypothyroidism who presented to [facility] with chief complaint of SOB, CP, leg swelling. Patient reports intermittent chest pain and shortness of breath for the past few weeks. Chest pain is described as pressure, worse with exertion, resolves with rest.  Patient also reports shortness of breath with exertion and orthopnea. Patient reports leg swelling that started yesterday. She states she went to her PCP today and had an abnormal EKG and was sent to the ED. Patient reports abdominal pain and nausea which she states are chronic. Patient denies fever/chills, cough, recent illness, vomiting/diarrhea, urinary symptoms. Patient is not having chest pain at this time. Patient admitted to hospitalist service with cardiology consult for further management\"    8/4: pt sitting up in bed, reports that her b/l LE swelling is all the way up her legs. Denies CP, SOB with exertion only.  No abd pain, n/v. Plan Cath today. Subjective (past 24 hours):   8/5: pt doing alright, she is trying to process what has been happening to her and admits to feeling overwhelmed. Denies any CP/palpitations. Denies SOB currently and reports that her b/l LE swelling is improving. Continue diuresis and hopefully discharge in the next 1-2 days. Past medical history, family history, social history and allergies reviewed again and is unchanged since admission. ROS (All review of systems completed. Pertinent positives noted. Otherwise All other systems reviewed and negative.)     Medications:  Reviewed    Infusion Medications    nitroGLYCERIN Stopped (08/05/22 1025)    sodium chloride       Scheduled Medications    digoxin  125 mcg IntraVENous Daily    metoprolol succinate  25 mg Oral Daily    furosemide  20 mg IntraVENous BID    levothyroxine  75 mcg Oral Daily    venlafaxine  150 mg Oral Daily with breakfast    sodium chloride flush  10 mL IntraVENous 2 times per day    enoxaparin  40 mg SubCUTAneous Q24H     PRN Meds: sodium chloride flush, sodium chloride, ondansetron **OR** ondansetron, polyethylene glycol, acetaminophen **OR** acetaminophen, nitroGLYCERIN, diphenhydrAMINE    No intake or output data in the 24 hours ending 08/05/22 1038      Diet:  ADULT DIET; Regular; Low Fat/Low Chol/High Fiber/2 gm Na; Low Sodium (2 gm); 2000 ml    Exam:  BP (!) 142/100   Pulse 86   Temp 97.8 °F (36.6 °C) (Oral)   Resp 16   Ht 5' 2.7\" (1.593 m)   Wt 160 lb 6.4 oz (72.8 kg)   SpO2 94%   BMI 28.69 kg/m²   General appearance: pleasant, no apparent distress, appears stated age and cooperative. HEENT: Pupils equal, round, and reactive to light. Conjunctivae/corneas clear. Neck: Supple, with full range of motion. No jugular venous distention. Trachea midline. Respiratory:  Normal respiratory effort. Clear to auscultation, bilaterally without Rales/Wheezes/Rhonchi.   Cardiovascular: Regular rate and rhythm with normal S1/S2 without murmurs, rubs or gallops. Abdomen: Soft, non-tender, non-distended with normal bowel sounds. Musculoskeletal: passive and active ROM x 4 extremities. + 3 non-pitting edema in b/l LE. Skin: Skin color, texture, turgor normal.  No rashes or lesions. Neurologic:  Neurovascularly intact without any focal sensory/motor deficits. Cranial nerves: II-XII intact, grossly non-focal.  Psychiatric: Alert and oriented x4, thought content appropriate, normal insight  Capillary Refill: Brisk,< 3 seconds   Peripheral Pulses: +2 palpable, equal bilaterally     Labs:   Recent Labs     08/03/22  1101 08/03/22  1610 08/05/22  0706   WBC 8.8 8.0 6.7   HGB 15.8 15.6 15.9   HCT 49.3* 48.2* 49.5*    205 204     Recent Labs     08/03/22  1610 08/04/22  0515 08/05/22  0706    136 135   K 3.8 4.5 3.6    101 100   CO2 21* 23 21*   BUN 14 14 16   CREATININE 0.9 1.2 1.0   CALCIUM 9.4 9.1 9.1     Recent Labs     08/03/22  1610   AST 32   ALT 45   BILITOT 1.1   ALKPHOS 118     Recent Labs     08/04/22  1109   INR 1.24*     No results for input(s): Arden Robin in the last 72 hours. Microbiology:    Blood culture #1: No results found for: BC    Blood culture #2:No results found for: Runell Clutter    Organism:No results found for: ORG    No results found for: LABGRAM    MRSA culture only:No results found for: [facility]    Urine culture: No results found for: LABURIN    Respiratory culture: No results found for: CULTRESP    Aerobic and Anaerobic :  No results found for: LABAERO  No results found for: LABANAE    Urinalysis:    No results found for: Acmeron Drone, BACTERIA, RBCUA, BLOODU, [address], GLUCOSEU    Radiology:  CTA [address]   Final Result      1. No evidence of pulmonary emboli. 2. Mild cardiomegaly. 3. Moderate right and small left pleural effusions. 4. Areas of atelectasis or infiltrate in the right and left lower lobes. 5. Bilateral breast implants in place.          **This report has been created using voice recognition software. It may contain minor errors which are inherent in voice recognition technology. **      Final report electronically signed by DR Inocencio Goins on 8/3/2022 5:00 PM        CTA CHEST W WO CONTRAST    Result Date: 8/3/2022  PROCEDURE: CTA CHEST W WO CONTRAST CLINICAL INFORMATION: pulmonary embolism. COMPARISON: No prior study. TECHNIQUE: 3 mm axial images were obtained through the chest after the administration of IV contrast.  A non-contrast localizer was obtained. 3D reconstructions were performed on the scanner to include MIP coronal and sagittal images through the chest. Isovue was the intravenous contrast utilized. All CT scans at this facility use dose modulation, iterative reconstruction, and/or weight-based dosing when appropriate to reduce radiation dose to as low as reasonably achievable. FINDINGS: There is adequate opacification of the pulmonary arterial system. No pulmonary emboli are present. The aorta is within acceptable limits. There is mild cardiomegaly. . There is no pericardial  effusion. There is a moderate right and small left pleural effusion present. There is no mediastinal, axillary or hilar adenopathy. . There are areas of atelectasis or infiltrate in the right and left lower lobes. No suspicious osseous lesions are present. There are bilateral breast implants present. There are no suspicious findings in the imaged aspects of the upper abdomen. 1. No evidence of pulmonary emboli. 2. Mild cardiomegaly. 3. Moderate right and small left pleural effusions. 4. Areas of atelectasis or infiltrate in the right and left lower lobes. 5. Bilateral breast implants in place. **This report has been created using voice recognition software. It may contain minor errors which are inherent in voice recognition technology. ** Final report electronically signed by DR Inocencio Goins on 8/3/2022 5:00 PM      Electronically signed by Tessie Dubois PA-C on 8/5/2022 at 10:38 AM

## 2022-08-06 LAB
ANION GAP SERPL CALCULATED.3IONS-SCNC: 12 MEQ/L (ref 8–16)
BUN BLDV-MCNC: 15 MG/DL (ref 7–22)
CALCIUM SERPL-MCNC: 9 MG/DL (ref 8.5–10.5)
CHLORIDE BLD-SCNC: 101 MEQ/L (ref 98–111)
CO2: 26 MEQ/L (ref 23–33)
CREAT SERPL-MCNC: 1 MG/DL (ref 0.4–1.2)
GFR SERPL CREATININE-BSD FRML MDRD: 58 ML/MIN/1.73M2
GLUCOSE BLD-MCNC: 113 MG/DL (ref 70–108)
POTASSIUM SERPL-SCNC: 3.8 MEQ/L (ref 3.5–5.2)
SODIUM BLD-SCNC: 139 MEQ/L (ref 135–145)

## 2022-08-06 PROCEDURE — 2580000003 HC RX 258: Performed by: PHYSICIAN ASSISTANT

## 2022-08-06 PROCEDURE — 6370000000 HC RX 637 (ALT 250 FOR IP): Performed by: PHYSICIAN ASSISTANT

## 2022-08-06 PROCEDURE — 99232 SBSQ HOSP IP/OBS MODERATE 35: CPT | Performed by: STUDENT IN AN ORGANIZED HEALTH CARE EDUCATION/TRAINING PROGRAM

## 2022-08-06 PROCEDURE — 6360000002 HC RX W HCPCS: Performed by: PHYSICIAN ASSISTANT

## 2022-08-06 PROCEDURE — 80048 BASIC METABOLIC PNL TOTAL CA: CPT

## 2022-08-06 PROCEDURE — 36415 COLL VENOUS BLD VENIPUNCTURE: CPT

## 2022-08-06 PROCEDURE — 99232 SBSQ HOSP IP/OBS MODERATE 35: CPT | Performed by: PHYSICIAN ASSISTANT

## 2022-08-06 PROCEDURE — 6360000002 HC RX W HCPCS: Performed by: STUDENT IN AN ORGANIZED HEALTH CARE EDUCATION/TRAINING PROGRAM

## 2022-08-06 PROCEDURE — 1200000003 HC TELEMETRY R&B

## 2022-08-06 PROCEDURE — 6370000000 HC RX 637 (ALT 250 FOR IP): Performed by: STUDENT IN AN ORGANIZED HEALTH CARE EDUCATION/TRAINING PROGRAM

## 2022-08-06 RX ORDER — GINSENG 100 MG
CAPSULE ORAL PRN
Status: DISCONTINUED | OUTPATIENT
Start: 2022-08-06 | End: 2022-08-08 | Stop reason: HOSPADM

## 2022-08-06 RX ORDER — DIGOXIN 125 MCG
125 TABLET ORAL DAILY
Status: DISCONTINUED | OUTPATIENT
Start: 2022-08-07 | End: 2022-08-08 | Stop reason: HOSPADM

## 2022-08-06 RX ADMIN — VENLAFAXINE HYDROCHLORIDE 150 MG: 150 CAPSULE, EXTENDED RELEASE ORAL at 08:40

## 2022-08-06 RX ADMIN — FUROSEMIDE 20 MG: 10 INJECTION, SOLUTION INTRAMUSCULAR; INTRAVENOUS at 18:07

## 2022-08-06 RX ADMIN — ENOXAPARIN SODIUM 40 MG: 100 INJECTION SUBCUTANEOUS at 21:10

## 2022-08-06 RX ADMIN — SACUBITRIL AND VALSARTAN 1 TABLET: 24; 26 TABLET, FILM COATED ORAL at 21:10

## 2022-08-06 RX ADMIN — DIGOXIN 125 MCG: 0.25 INJECTION INTRAMUSCULAR; INTRAVENOUS at 08:43

## 2022-08-06 RX ADMIN — DIPHENHYDRAMINE HYDROCHLORIDE 25 MG: 50 INJECTION, SOLUTION INTRAMUSCULAR; INTRAVENOUS at 21:16

## 2022-08-06 RX ADMIN — SODIUM CHLORIDE, PRESERVATIVE FREE 10 ML: 5 INJECTION INTRAVENOUS at 08:40

## 2022-08-06 RX ADMIN — FUROSEMIDE 20 MG: 10 INJECTION, SOLUTION INTRAMUSCULAR; INTRAVENOUS at 08:41

## 2022-08-06 RX ADMIN — METOPROLOL SUCCINATE 25 MG: 25 TABLET, EXTENDED RELEASE ORAL at 08:46

## 2022-08-06 RX ADMIN — SODIUM CHLORIDE, PRESERVATIVE FREE 10 ML: 5 INJECTION INTRAVENOUS at 21:10

## 2022-08-06 RX ADMIN — LEVOTHYROXINE SODIUM 75 MCG: 0.07 TABLET ORAL at 05:56

## 2022-08-06 RX ADMIN — SACUBITRIL AND VALSARTAN 1 TABLET: 24; 26 TABLET, FILM COATED ORAL at 08:40

## 2022-08-06 ASSESSMENT — PAIN SCALES - GENERAL: PAINLEVEL_OUTOF10: 0

## 2022-08-06 NOTE — PROGRESS NOTES
Cardiology Progress Note      Patient:  Anne Farfan  YOB: 1970  MRN: [id number]   Acct: [de-identified]  Admit Date:  8/3/2022  Primary Cardiologist:  none  Note per Dr Peter Quant:  \"CHIEF COMPLAINT: SOB, LE        HPI: This is a pleasant 47 y.o. female with a PMHx significant for depression, Hypothyroid and hypercholesterolemia who presents to [facility] with complaints of LE, orthopnea, BLE swelling and intermittent sharp epigastric pain x2-3 weeks. Symptoms have been getting progressively worse. She is unable to lay flat due to SOB. She says even minor activity exacerbated her SOB however when she rests the symptoms subside. She has had nausea and diarrhea as well. She denies fever, chills, CP, vomiting. Workup revealed elevated BNP with negative troponin and elevated ddimer. CTA chest was negative for PE but did show muild cardiomegaly, b/l pleural effusions. EKG showed sinus tachy, right axis deviation and intraventricular conduction delay. No acute ischemic changes were identified. Echo showed preliminary EF ~10%. She quit drinking 1 month ago but previously would have 3 drinks per week. Previous smoker of 1 pack per week. She has no other acute complaints\"    Subjective (Events in last 24 hours)  Patient is aware about the lifevest not being approved. It was fitted and is awaiting appeal. Understands the cost if not approved and will address if needed. She feels okay, has not obvious chest pain or SOB today. Swelling is better. Overall, she is feeling better. D/w patient about lab work and medication adjustments including how her BP has been. She expressed understanding and agrees with current plan. Willing to see as needed for lifevest approval.     08/05/2022:   Pt awake, alert. NAD. Denies cp, sob currently. Feels okay today.  Previously she had significant increase in weight despite limited food intake and felt her swelling and chest/abdomen were tighter than normal. D/w patietn about cath precautions including no lifting. D/w patient about cath results and echo. We will order lifevest which she is agreeable to to reduce risk of SCD. We will add medications as tolerated, monitor BP and symptoms while doing so. She will need CHF referral and close follow up.      Objective:   BP (!) 124/92   Pulse 82   Temp 98.4 °F (36.9 °C) (Oral)   Resp 16   Ht 5' 2.7\" (1.593 m)   Wt 169 lb 3.2 oz (76.7 kg)   SpO2 94%   BMI 30.26 kg/m²      Vss, bp is elevated but stable  TELEMETRY: sinus tachy    Physical Exam:  General Appearance: alert and oriented to person, place and time, in no acute distress  Cardiovascular: borderline tachy rate, regular rhythm, normal S1 and S2, no murmurs, rubs, clicks, or gallops, distal pulses intact, no carotid bruits, no JVD  Pulmonary/Chest: clear to auscultation bilaterally- no wheezes, rales or rhonchi, normal air movement, no respiratory distress  Abdomen: soft, non-tender, non-distended, normal bowel sounds, no masses   Extremities: no cyanosis, clubbing, mild bilat LE edema, pulse   Skin: warm and dry, no rash or erythema  Neurological: alert, oriented, normal speech, no focal findings or movement disorder noted  Right radial-no ecchymosis, nontender, NVI   Medications:    digoxin  125 mcg IntraVENous Daily    metoprolol succinate  25 mg Oral Daily    sacubitril-valsartan  1 tablet Oral BID    furosemide  20 mg IntraVENous BID    levothyroxine  75 mcg Oral Daily    venlafaxine  150 mg Oral Daily with breakfast    sodium chloride flush  10 mL IntraVENous 2 times per day    enoxaparin  40 mg SubCUTAneous Q24H      nitroGLYCERIN Stopped (08/05/22 1025)    sodium chloride       sodium chloride flush, 10 mL, PRN  sodium chloride, , PRN  ondansetron, 4 mg, Q8H PRN   Or  ondansetron, 4 mg, Q6H PRN  polyethylene glycol, 17 g, Daily PRN  acetaminophen, 650 mg, Q6H PRN   Or  acetaminophen, 650 mg, Q6H PRN  nitroGLYCERIN, 0.4 mg, Q5 Min PRN  diphenhydrAMINE, 25 mg, Nightly PRN      Diagnostics:  EKG:     Echo:   Conclusions      Summary   Moderately dilated left ventricular size and severely reduce systolic   function. There was severe global hypokinesis. Wall thickness was within normal limits. Ejection fraction was estimated at 10-15%. The right ventricular size was normal with moderately reduced systolic   function and normal wall thickness. There was trace mitral regurgitation. There was mild tricuspid regurgitation. Assuming RAP = 10 mmHg, the   estimated RVSP = 29 mmHg. IVC size is dilated with reduced respiratory phasic changes (CVP~5-10   mmHg)      Signature      ----------------------------------------------------------------   Electronically signed by oWng Hebert MD (Interpreting   physician) on 08/04/2022  Stress:     Left Heart Cath:   CORONARY ANGIOGRAM:  1. Right coronary artery is a dominant vessel. There is no significant  disease in the proximal, mid, and distal portions of the vessel. 2.  Left main is patent, gives rise to LAD and left circumflex arteries. 3.  Left circumflex artery is patent with no significant disease in the  proximal, mid, and distal portions of the vessel. 4. LAD is patent with no significant disease in the proximal, mid, and  distal portions of the vessel. RIGHT HEART PRESSURES:  1.  RA is 16.  2.  RV is 55/9, 22.  3.  PA is 53/32, 41.  4.  Wedge pressure is 29.  5.  LVEDP was measured to be 37 mmHg. 6.  AO sat was 87.  7.  PA is 57.  8.  Cardiac output was 2.88 and cardiac index was calculated at 1.6. The patient tolerated the procedure well. There were no immediate  complications. We gave IV Lasix on the table due to elevated LVEDP. She was transferred to her room in stable condition. SUMMARY:  1. Elevated right-sided filling pressures, elevated wedge pressure. 2.  Patent coronaries. RECOMMENDATIONS:  1. Continue IV diuresis. 2.  Monitor hemodynamics closely.   3.  Optimize heart failure swelling and breathing are better. Daily weights  2 g sodium restriction daily  2 L fluid restriction daily  Keep K>4, Mg>2    CHF Guidelines  BB-yes. Toprol 25 mg daily. ACE/ARB/Entresto- entresto 24/26 mg BID. Diuretics-yes. Lasix 20 mg IV BID   Aldactone-no. Consider later  Farxiga-no. Depends on insurance. Start digoxin 125 mcg PO tomorrow. BP looks better now after addition of toprol and entresto. Will continue to monitor for titration. Will continue with IV lasix today. Likely switch to PO dosing tomorrow. Recommend cardiac MRI upon discharge. Can be done at [facility] (closest facility). Recommended to stay until lifevest is sorted out. Patient is agreeable. Udnerstands risk of SCD without it. Cardiology will follow.    Electronically signed by Lakeshia Ho PA-C on 8/6/2022 at 12:57 PM

## 2022-08-06 NOTE — PROGRESS NOTES
Hospitalist Progress Note      Patient:  Lazarus Pickle    Unit/Bed:8B-25/025-A  YOB: 1970  MRN: [id number]   Acct: [de-identified]   PCP: Abdias Hunt MD  Date of Admission: 8/3/2022    Assessment/Plan:    New onset severe CMP / HFrEF: Cardiology consulted. Unclear etiology, ruled out ischemic etiology with LHC/RHC for further evaluation today. Suspect potential viral etiology. CRP / ESR sent. ECHO returned with EF 10-15%. Digoxin 125 mcg started. GDMT with BB / Lasix. If BP tolerates will start Entresto later 8/5. May consider adding aldactone and SGLT2 if able to tolerate later in clinical course. Low Na diet / fluid restriction. Strict I/Os. Daily weights. Pt will need LifeVest prior to discharge  Will need f/u for cardiac    8/6: given insurance issues, pt denied LifeVest, they have 72 hours to review data and make a further decision. Discharge will be delayed until mid-next week at the earliest.     Essential HTN: no hx, started on above med regimen, monitor BP closely   Acquired hypothyroidism: Synthroid  Anxiety / depression: Effexor    Chief Complaint: SOB    Initial H and P:-    \"Lisa Masters is a 47 y.o. female with PMHx of depression, hypothyroidism who presented to [facility] with chief complaint of SOB, CP, leg swelling. Patient reports intermittent chest pain and shortness of breath for the past few weeks. Chest pain is described as pressure, worse with exertion, resolves with rest.  Patient also reports shortness of breath with exertion and orthopnea. Patient reports leg swelling that started yesterday. She states she went to her PCP today and had an abnormal EKG and was sent to the ED. Patient reports abdominal pain and nausea which she states are chronic. Patient denies fever/chills, cough, recent illness, vomiting/diarrhea, urinary symptoms. Patient is not having chest pain at this time.   Patient admitted to hospitalist service with cardiology consult for further management\"    8/4: pt sitting up in bed, reports that her b/l LE swelling is all the way up her legs. Denies CP, SOB with exertion only. No abd pain, n/v. Plan Cath today. 8/5: pt doing alright, she is trying to process what has been happening to her and admits to feeling overwhelmed. Denies any CP/palpitations. Denies SOB currently and reports that her b/l LE swelling is improving. Continue diuresis and hopefully discharge in the next 1-2 days. Subjective (past 24 hours):   8/6: no new complaints, denies CP/SOB. Delay in discharge due to insurance denying LifeVest. BP has remained stable with addition of Entresto. Past medical history, family history, social history and allergies reviewed again and is unchanged since admission. ROS (All review of systems completed. Pertinent positives noted. Otherwise All other systems reviewed and negative.)     Medications:  Reviewed    Infusion Medications    nitroGLYCERIN Stopped (08/05/22 1025)    sodium chloride       Scheduled Medications    [START ON 8/7/2022] digoxin  125 mcg Oral Daily    metoprolol succinate  25 mg Oral Daily    sacubitril-valsartan  1 tablet Oral BID    furosemide  20 mg IntraVENous BID    levothyroxine  75 mcg Oral Daily    venlafaxine  150 mg Oral Daily with breakfast    sodium chloride flush  10 mL IntraVENous 2 times per day    enoxaparin  40 mg SubCUTAneous Q24H     PRN Meds: sodium chloride flush, sodium chloride, ondansetron **OR** ondansetron, polyethylene glycol, acetaminophen **OR** acetaminophen, nitroGLYCERIN, diphenhydrAMINE      Intake/Output Summary (Last 24 hours) at 8/6/2022 1446  Last data filed at 8/6/2022 4600  Gross per 24 hour   Intake 500 ml   Output 900 ml   Net -400 ml         Diet:  ADULT DIET; Regular; Low Fat/Low Chol/High Fiber/2 gm Na;  Low Sodium (2 gm); 2000 ml    Exam:  BP (!) 124/92   Pulse 82   Temp 98.4 °F (36.9 °C) (Oral)   Resp 16   Ht 5' 2.7\" (1.593 m) Wt 169 lb 3.2 oz (76.7 kg)   SpO2 94%   BMI 30.26 kg/m²   General appearance: pleasant, no apparent distress, appears stated age and cooperative. HEENT: Pupils equal, round, and reactive to light. Conjunctivae/corneas clear. Neck: Supple, with full range of motion. No jugular venous distention. Trachea midline. Respiratory:  Normal respiratory effort. Clear to auscultation, bilaterally without Rales/Wheezes/Rhonchi. Cardiovascular: Regular rate and rhythm with normal S1/S2 without murmurs, rubs or gallops. Abdomen: Soft, non-tender, non-distended with normal bowel sounds. Musculoskeletal: passive and active ROM x 4 extremities. + 3 non-pitting edema in b/l LE. Skin: Skin color, texture, turgor normal.  No rashes or lesions. Neurologic:  Neurovascularly intact without any focal sensory/motor deficits. Cranial nerves: II-XII intact, grossly non-focal.  Psychiatric: Alert and oriented x4, thought content appropriate, normal insight  Capillary Refill: Brisk,< 3 seconds   Peripheral Pulses: +2 palpable, equal bilaterally     Labs:   Recent Labs     08/03/22  1610 08/05/22  0706   WBC 8.0 6.7   HGB 15.6 15.9   HCT 48.2* 49.5*    204     Recent Labs     08/04/22  0515 08/05/22  0706 08/06/22  0605    135 139   K 4.5 3.6 3.8    100 101   CO2 23 21* 26   BUN 14 16 15   CREATININE 1.2 1.0 1.0   CALCIUM 9.1 9.1 9.0     Recent Labs     08/03/22  1610   AST 32   ALT 45   BILITOT 1.1   ALKPHOS 118     Recent Labs     08/04/22  1109   INR 1.24*     No results for input(s): Michelle Aden in the last 72 hours.     Microbiology:    Blood culture #1: No results found for: BC    Blood culture #2:No results found for: Ashish Hodgson    Organism:No results found for: ORG    No results found for: LABGRAM    MRSA culture only:No results found for: [facility]    Urine culture: No results found for: LABURIN    Respiratory culture: No results found for: CULTRESP    Aerobic and Anaerobic :  No results found for: LABAERO  No results found for: LABANAE    Urinalysis:    No results found for: Jacky Maria G, BACTERIA, RBCUA, BLOODU, [address], [address] [place] [address]    Radiology:  CTA CHEST W WO CONTRAST   Final Result      1. No evidence of pulmonary emboli. 2. Mild cardiomegaly. 3. Moderate right and small left pleural effusions. 4. Areas of atelectasis or infiltrate in the right and left lower lobes. 5. Bilateral breast implants in place. **This report has been created using voice recognition software. It may contain minor errors which are inherent in voice recognition technology. **      Final report electronically signed by DR Caleb Valdivia on 8/3/2022 5:00 PM        CTA CHEST W WO CONTRAST    Result Date: 8/3/2022  PROCEDURE: CTA CHEST W WO CONTRAST CLINICAL INFORMATION: pulmonary embolism. COMPARISON: No prior study. TECHNIQUE: 3 mm axial images were obtained through the chest after the administration of IV contrast.  A non-contrast localizer was obtained. 3D reconstructions were performed on the scanner to include MIP coronal and sagittal images through the chest. Isovue was the intravenous contrast utilized. All CT scans at this facility use dose modulation, iterative reconstruction, and/or weight-based dosing when appropriate to reduce radiation dose to as low as reasonably achievable. FINDINGS: There is adequate opacification of the pulmonary arterial system. No pulmonary emboli are present. The aorta is within acceptable limits. There is mild cardiomegaly. . There is no pericardial  effusion. There is a moderate right and small left pleural effusion present. There is no mediastinal, axillary or hilar adenopathy. . There are areas of atelectasis or infiltrate in the right and left lower lobes. No suspicious osseous lesions are present. There are bilateral breast implants present. There are no suspicious findings in the imaged aspects of the upper abdomen. 1. No evidence of pulmonary emboli. 2. Mild cardiomegaly. 3. Moderate right and small left pleural effusions. 4. Areas of atelectasis or infiltrate in the right and left lower lobes. 5. Bilateral breast implants in place. **This report has been created using voice recognition software. It may contain minor errors which are inherent in voice recognition technology. ** Final report electronically signed by DR Eric Brush on 8/3/2022 5:00 PM      Electronically signed by Heena Vazquez PA-C on 8/6/2022 at 2:47 PM

## 2022-08-06 NOTE — PLAN OF CARE
Problem: Discharge Planning  Goal: Discharge to home or other facility with appropriate resources  8/5/2022 2244 by Polly Mcburney, RN  Outcome: Progressing  Educated pt on the use of life vest when leaving the hospital. Pt educated on discharge needs like new medications, weighing self daily, low sodium diet. Will continue to monitor.      8/5/2022 1139 by Colin Ellison RN  Outcome: Progressing  Flowsheets (Taken 8/5/2022 0895)  Discharge to home or other facility with appropriate resources: Identify barriers to discharge with patient and caregiver

## 2022-08-07 LAB
ANION GAP SERPL CALCULATED.3IONS-SCNC: 11 MEQ/L (ref 8–16)
BUN BLDV-MCNC: 14 MG/DL (ref 7–22)
CALCIUM SERPL-MCNC: 9.4 MG/DL (ref 8.5–10.5)
CHLORIDE BLD-SCNC: 102 MEQ/L (ref 98–111)
CO2: 29 MEQ/L (ref 23–33)
CREAT SERPL-MCNC: 1 MG/DL (ref 0.4–1.2)
GFR SERPL CREATININE-BSD FRML MDRD: 58 ML/MIN/1.73M2
GLUCOSE BLD-MCNC: 119 MG/DL (ref 70–108)
POTASSIUM SERPL-SCNC: 5.1 MEQ/L (ref 3.5–5.2)
SODIUM BLD-SCNC: 142 MEQ/L (ref 135–145)

## 2022-08-07 PROCEDURE — 6360000002 HC RX W HCPCS: Performed by: PHYSICIAN ASSISTANT

## 2022-08-07 PROCEDURE — 6370000000 HC RX 637 (ALT 250 FOR IP): Performed by: STUDENT IN AN ORGANIZED HEALTH CARE EDUCATION/TRAINING PROGRAM

## 2022-08-07 PROCEDURE — 6370000000 HC RX 637 (ALT 250 FOR IP): Performed by: PHYSICIAN ASSISTANT

## 2022-08-07 PROCEDURE — 2580000003 HC RX 258: Performed by: PHYSICIAN ASSISTANT

## 2022-08-07 PROCEDURE — 1200000003 HC TELEMETRY R&B

## 2022-08-07 PROCEDURE — 6360000002 HC RX W HCPCS: Performed by: STUDENT IN AN ORGANIZED HEALTH CARE EDUCATION/TRAINING PROGRAM

## 2022-08-07 PROCEDURE — 80048 BASIC METABOLIC PNL TOTAL CA: CPT

## 2022-08-07 PROCEDURE — 36415 COLL VENOUS BLD VENIPUNCTURE: CPT

## 2022-08-07 PROCEDURE — 99232 SBSQ HOSP IP/OBS MODERATE 35: CPT | Performed by: STUDENT IN AN ORGANIZED HEALTH CARE EDUCATION/TRAINING PROGRAM

## 2022-08-07 PROCEDURE — 99231 SBSQ HOSP IP/OBS SF/LOW 25: CPT | Performed by: PHYSICIAN ASSISTANT

## 2022-08-07 RX ORDER — DOCUSATE SODIUM 100 MG/1
100 CAPSULE, LIQUID FILLED ORAL DAILY
Status: DISCONTINUED | OUTPATIENT
Start: 2022-08-07 | End: 2022-08-08 | Stop reason: HOSPADM

## 2022-08-07 RX ORDER — METOPROLOL SUCCINATE 50 MG/1
50 TABLET, EXTENDED RELEASE ORAL DAILY
Status: DISCONTINUED | OUTPATIENT
Start: 2022-08-08 | End: 2022-08-08 | Stop reason: HOSPADM

## 2022-08-07 RX ORDER — FUROSEMIDE 20 MG/1
20 TABLET ORAL 2 TIMES DAILY
Status: DISCONTINUED | OUTPATIENT
Start: 2022-08-07 | End: 2022-08-08 | Stop reason: HOSPADM

## 2022-08-07 RX ORDER — METOPROLOL SUCCINATE 25 MG/1
25 TABLET, EXTENDED RELEASE ORAL ONCE
Status: COMPLETED | OUTPATIENT
Start: 2022-08-07 | End: 2022-08-07

## 2022-08-07 RX ADMIN — METOPROLOL SUCCINATE 25 MG: 25 TABLET, EXTENDED RELEASE ORAL at 08:29

## 2022-08-07 RX ADMIN — SODIUM CHLORIDE, PRESERVATIVE FREE 10 ML: 5 INJECTION INTRAVENOUS at 21:29

## 2022-08-07 RX ADMIN — METOPROLOL SUCCINATE 25 MG: 25 TABLET, EXTENDED RELEASE ORAL at 12:22

## 2022-08-07 RX ADMIN — VENLAFAXINE HYDROCHLORIDE 150 MG: 150 CAPSULE, EXTENDED RELEASE ORAL at 08:28

## 2022-08-07 RX ADMIN — LEVOTHYROXINE SODIUM 75 MCG: 0.07 TABLET ORAL at 05:08

## 2022-08-07 RX ADMIN — SACUBITRIL AND VALSARTAN 1 TABLET: 24; 26 TABLET, FILM COATED ORAL at 08:28

## 2022-08-07 RX ADMIN — DIGOXIN 125 MCG: 125 TABLET ORAL at 08:29

## 2022-08-07 RX ADMIN — ENOXAPARIN SODIUM 40 MG: 100 INJECTION SUBCUTANEOUS at 21:29

## 2022-08-07 RX ADMIN — SODIUM CHLORIDE, PRESERVATIVE FREE 10 ML: 5 INJECTION INTRAVENOUS at 08:29

## 2022-08-07 RX ADMIN — SACUBITRIL AND VALSARTAN 1 TABLET: 24; 26 TABLET, FILM COATED ORAL at 21:28

## 2022-08-07 RX ADMIN — ACETAMINOPHEN 650 MG: 325 TABLET ORAL at 05:08

## 2022-08-07 RX ADMIN — FUROSEMIDE 20 MG: 10 INJECTION, SOLUTION INTRAMUSCULAR; INTRAVENOUS at 08:29

## 2022-08-07 RX ADMIN — FUROSEMIDE 20 MG: 20 TABLET ORAL at 18:42

## 2022-08-07 NOTE — PROGRESS NOTES
Cardiology Progress Note      Patient:  Nadja Luque  YOB: 1970  MRN: [id number]   Acct: [de-identified]  Admit Date:  8/3/2022  Primary Cardiologist:  none  Note per Dr Loera Lay:  \"CHIEF COMPLAINT: SOB, LE        HPI: This is a pleasant 47 y.o. female with a PMHx significant for depression, Hypothyroid and hypercholesterolemia who presents to [facility] with complaints of LE, orthopnea, BLE swelling and intermittent sharp epigastric pain x2-3 weeks. Symptoms have been getting progressively worse. She is unable to lay flat due to SOB. She says even minor activity exacerbated her SOB however when she rests the symptoms subside. She has had nausea and diarrhea as well. She denies fever, chills, CP, vomiting. Workup revealed elevated BNP with negative troponin and elevated ddimer. CTA chest was negative for PE but did show muild cardiomegaly, b/l pleural effusions. EKG showed sinus tachy, right axis deviation and intraventricular conduction delay. No acute ischemic changes were identified. Echo showed preliminary EF ~10%. She quit drinking 1 month ago but previously would have 3 drinks per week. Previous smoker of 1 pack per week. She has no other acute complaints\"    Subjective (Events in last 24 hours)  Pt awake, alert. Would like to take a shower. D/w patient about medication adjustments including toprol to 50 daily and switching lasix to 20 PO BID. K is 5.1 today, need to monitor closely. Overall, she is feeling okay. BP is better, diastolic still higher typically. 08/06/2022  Patient is aware about the lifevest not being approved. It was fitted and is awaiting appeal. Understands the cost if not approved and will address if needed. She feels okay, has not obvious chest pain or SOB today. Swelling is better. Overall, she is feeling better. D/w patient about lab work and medication adjustments including how her BP has been. She expressed understanding and agrees with current plan.  Willing to see as needed for lifevest approval.     08/05/2022:   Pt awake, alert. NAD. Denies cp, sob currently. Feels okay today. Previously she had significant increase in weight despite limited food intake and felt her swelling and chest/abdomen were tighter than normal. D/w patietn about cath precautions including no lifting. D/w patient about cath results and echo. We will order lifevest which she is agreeable to to reduce risk of SCD. We will add medications as tolerated, monitor BP and symptoms while doing so. She will need CHF referral and close follow up.      Objective:   BP (!) 124/98   Pulse 80   Temp 97.8 °F (36.6 °C) (Oral)   Resp 16   Ht 5' 2.7\" (1.593 m)   Wt 169 lb 3.2 oz (76.7 kg)   SpO2 96%   BMI 30.26 kg/m²      Vss,   TELEMETRY: nsr    Physical Exam:  General Appearance: alert and oriented to person, place and time, in no acute distress  Cardiovascular: borderline tachy rate, regular rhythm, normal S1 and S2, no murmurs, rubs, clicks, or gallops, distal pulses intact, no carotid bruits, no JVD  Pulmonary/Chest: clear to auscultation bilaterally- no wheezes, rales or rhonchi, normal air movement, no respiratory distress  Abdomen: soft, non-tender, non-distended, normal bowel sounds, no masses   Extremities: no cyanosis, clubbing, mild bilat LE edema, pulse   Skin: warm and dry, no rash or erythema  Neurological: alert, oriented, normal speech, no focal findings or movement disorder noted  Right radial-no ecchymosis, nontender, NVI   Medications:    [START ON 8/8/2022] metoprolol succinate  50 mg Oral Daily    metoprolol succinate  25 mg Oral Once    furosemide  20 mg Oral BID    digoxin  125 mcg Oral Daily    sacubitril-valsartan  1 tablet Oral BID    levothyroxine  75 mcg Oral Daily    venlafaxine  150 mg Oral Daily with breakfast    sodium chloride flush  10 mL IntraVENous 2 times per day    enoxaparin  40 mg SubCUTAneous Q24H      nitroGLYCERIN Stopped (08/05/22 1025)    sodium chloride       bacitracin, , PRN  sodium chloride flush, 10 mL, PRN  sodium chloride, , PRN  ondansetron, 4 mg, Q8H PRN   Or  ondansetron, 4 mg, Q6H PRN  polyethylene glycol, 17 g, Daily PRN  acetaminophen, 650 mg, Q6H PRN   Or  acetaminophen, 650 mg, Q6H PRN  nitroGLYCERIN, 0.4 mg, Q5 Min PRN  diphenhydrAMINE, 25 mg, Nightly PRN      Diagnostics:  EKG:     Echo:   Conclusions      Summary   Moderately dilated left ventricular size and severely reduce systolic   function. There was severe global hypokinesis. Wall thickness was within normal limits. Ejection fraction was estimated at 10-15%. The right ventricular size was normal with moderately reduced systolic   function and normal wall thickness. There was trace mitral regurgitation. There was mild tricuspid regurgitation. Assuming RAP = 10 mmHg, the   estimated RVSP = 29 mmHg. IVC size is dilated with reduced respiratory phasic changes (CVP~5-10   mmHg)      Signature      ----------------------------------------------------------------   Electronically signed by Marely Barnes MD (Interpreting   physician) on 08/04/2022  Stress:     Left Heart Cath:   CORONARY ANGIOGRAM:  1. Right coronary artery is a dominant vessel. There is no significant  disease in the proximal, mid, and distal portions of the vessel. 2.  Left main is patent, gives rise to LAD and left circumflex arteries. 3.  Left circumflex artery is patent with no significant disease in the  proximal, mid, and distal portions of the vessel. 4. LAD is patent with no significant disease in the proximal, mid, and  distal portions of the vessel. RIGHT HEART PRESSURES:  1.  RA is 16.  2.  RV is 55/9, 22.  3.  PA is 53/32, 41.  4.  Wedge pressure is 29.  5.  LVEDP was measured to be 37 mmHg. 6.  AO sat was 87.  7.  PA is 57.  8.  Cardiac output was 2.88 and cardiac index was calculated at 1.6. The patient tolerated the procedure well. There were no immediate  complications.   We gave IV Lasix on the table due to elevated LVEDP. She was transferred to her room in stable condition. SUMMARY:  1. Elevated right-sided filling pressures, elevated wedge pressure. 2.  Patent coronaries. RECOMMENDATIONS:  1. Continue IV diuresis. 2.  Monitor hemodynamics closely. 3.  Optimize heart failure therapies. 4.  Continue rest of the management. Mercy Wooten MD     D: 08/04/2022  Lab Data:    Cardiac Enzymes:  No results for input(s): CKTOTAL, CKMB, CKMBINDEX, TROPONINI in the last 72 hours.     CBC:   Lab Results   Component Value Date/Time    WBC 6.7 08/05/2022 07:06 AM    RBC 5.24 08/05/2022 07:06 AM    RBC 5.26 04/30/2018 09:52 AM    HGB 15.9 08/05/2022 07:06 AM    HCT 49.5 08/05/2022 07:06 AM     08/05/2022 07:06 AM       CMP:    Lab Results   Component Value Date/Time     08/07/2022 05:17 AM    K 5.1 08/07/2022 05:17 AM    K 3.8 08/03/2022 04:10 PM     08/07/2022 05:17 AM    CO2 29 08/07/2022 05:17 AM    BUN 14 08/07/2022 05:17 AM    CREATININE 1.0 08/07/2022 05:17 AM    LABGLOM 58 08/07/2022 05:17 AM    GLUCOSE 119 08/07/2022 05:17 AM    GLUCOSE 90 04/30/2018 09:52 AM    CALCIUM 9.4 08/07/2022 05:17 AM       Hepatic Function Panel:    Lab Results   Component Value Date/Time    ALKPHOS 118 08/03/2022 04:10 PM    ALT 45 08/03/2022 04:10 PM    AST 32 08/03/2022 04:10 PM    PROT 6.5 08/03/2022 04:10 PM    BILITOT 1.1 08/03/2022 04:10 PM    BILIDIR 0.2 10/19/2011 08:05 AM    LABALBU 4.1 08/03/2022 04:10 PM    LABALBU 4.5 10/19/2011 08:05 AM       Magnesium:  No results found for: MG    PT/INR:    Lab Results   Component Value Date/Time    INR 1.24 08/04/2022 11:09 AM       HgBA1c:  No results found for: LABA1C    FLP:    Lab Results   Component Value Date/Time    TRIG 116 06/13/2022 11:57 AM    HDL 62 06/13/2022 11:57 AM    LDLCALC 121 06/13/2022 11:57 AM    LABVLDL 18 04/30/2018 09:52 AM       TSH:    Lab Results   Component Value Date/Time    TSH 2.100 06/13/2022 11:57 AM Assessment:  Acute systolic/diastolic CHF/NICMP  EF 07-78%  HTN- improving  Patent coronaries per cath 08/04    D/w Dr Lori Hussein:  Currently, this 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 Daily BMP. Daily weights  2 g sodium restriction daily  2 L fluid restriction daily  Keep K>4, Mg>2    CHF Guidelines  BB-yes. Toprol 50 mg daily. ACE/ARB/Entresto- entresto 24/26 mg BID. Diuretics-yes. Switch to 20 mg PO BID  Aldactone-no. Consider later  Farxiga-no. Depends on insurance. Start digoxin 125 mcg PO tomorrow. BP looks better now after addition of toprol and entresto. Will continue to monitor for titration. Recommend cardiac MRI upon discharge. Can be done at [facility] (closest facility). Recommended to stay until lifevest is sorted out. Patient is agreeable. Udnerstands risk of SCD without it. Cardiology will follow.    Electronically signed by Tesha Reeves PA-C on 8/7/2022 at 8:57 AM

## 2022-08-07 NOTE — PROGRESS NOTES
Hospitalist Progress Note      Patient:  aMrgi Anthony    Unit/Bed:8B-25/025-A  YOB: 1970  MRN: [id number]   Acct: [de-identified]   PCP: Tomás Trejo MD  Date of Admission: 8/3/2022    Assessment/Plan:    New onset severe CMP / HFrEF: Cardiology consulted. Unclear etiology, ruled out ischemic etiology with LHC/RHC for further evaluation today. Suspect potential viral etiology. CRP / ESR sent. ECHO returned with EF 10-15%. Digoxin 125 mcg started. GDMT with BB / Lasix. If BP tolerates will start Entresto later 8/5. May consider adding aldactone and SGLT2 if able to tolerate later in clinical course. Low Na diet / fluid restriction. Strict I/Os. Daily weights. Pt will need LifeVest prior to discharge  Will need f/u for cardiac    8/6: given insurance issues, pt denied LifeVest, they have 72 hours to review data and make a further decision. Discharge will be delayed until mid-next week at the earliest.     Essential HTN: no hx, started on above med regimen, monitor BP closely   Acquired hypothyroidism: Synthroid  Anxiety / depression: Effexor    Chief Complaint: SOB    Initial H and P:-    \"Lisa Valle is a 47 y.o. female with PMHx of depression, hypothyroidism who presented to [facility] with chief complaint of SOB, CP, leg swelling. Patient reports intermittent chest pain and shortness of breath for the past few weeks. Chest pain is described as pressure, worse with exertion, resolves with rest.  Patient also reports shortness of breath with exertion and orthopnea. Patient reports leg swelling that started yesterday. She states she went to her PCP today and had an abnormal EKG and was sent to the ED. Patient reports abdominal pain and nausea which she states are chronic. Patient denies fever/chills, cough, recent illness, vomiting/diarrhea, urinary symptoms. Patient is not having chest pain at this time.   Patient admitted to hospitalist service with cardiology consult for further management\"    8/4: pt sitting up in bed, reports that her b/l LE swelling is all the way up her legs. Denies CP, SOB with exertion only. No abd pain, n/v. Plan Cath today. 8/5: pt doing alright, she is trying to process what has been happening to her and admits to feeling overwhelmed. Denies any CP/palpitations. Denies SOB currently and reports that her b/l LE swelling is improving. Continue diuresis and hopefully discharge in the next 1-2 days. 8/6: no new complaints, denies CP/SOB. Delay in discharge due to insurance denying LifeVest. BP has remained stable with addition of Entresto. Subjective (past 24 hours):   8/7: pt reports feeling very tired today, no other changes. No CP/SOB. Past medical history, family history, social history and allergies reviewed again and is unchanged since admission. ROS (All review of systems completed. Pertinent positives noted. Otherwise All other systems reviewed and negative.)     Medications:  Reviewed    Infusion Medications    nitroGLYCERIN Stopped (08/05/22 1025)    sodium chloride       Scheduled Medications    [START ON 8/8/2022] metoprolol succinate  50 mg Oral Daily    furosemide  20 mg Oral BID    docusate sodium  100 mg Oral Daily    digoxin  125 mcg Oral Daily    sacubitril-valsartan  1 tablet Oral BID    levothyroxine  75 mcg Oral Daily    venlafaxine  150 mg Oral Daily with breakfast    sodium chloride flush  10 mL IntraVENous 2 times per day    enoxaparin  40 mg SubCUTAneous Q24H     PRN Meds: bacitracin, sodium chloride flush, sodium chloride, ondansetron **OR** ondansetron, polyethylene glycol, acetaminophen **OR** acetaminophen, nitroGLYCERIN, diphenhydrAMINE      Intake/Output Summary (Last 24 hours) at 8/7/2022 1308  Last data filed at 8/7/2022 1033  Gross per 24 hour   Intake 240 ml   Output 250 ml   Net -10 ml         Diet:  ADULT DIET; Regular; Low Fat/Low Chol/High Fiber/2 gm Na;  Low Sodium (2 gm); 2000 ml    Exam:  /77   Pulse 77   Temp 97.9 °F (36.6 °C) (Oral)   Resp 18   Ht 5' 2.7\" (1.593 m)   Wt 165 lb 11.2 oz (75.2 kg)   SpO2 99%   BMI 29.63 kg/m²   General appearance: pleasant, no apparent distress, appears stated age and cooperative. HEENT: Pupils equal, round, and reactive to light. Conjunctivae/corneas clear. Neck: Supple, with full range of motion. No jugular venous distention. Trachea midline. Respiratory:  Normal respiratory effort. Clear to auscultation, bilaterally without Rales/Wheezes/Rhonchi. Cardiovascular: Regular rate and rhythm with normal S1/S2 without murmurs, rubs or gallops. Abdomen: Soft, non-tender, non-distended with normal bowel sounds. Musculoskeletal: passive and active ROM x 4 extremities. + 3 non-pitting edema in b/l LE. Skin: Skin color, texture, turgor normal.  No rashes or lesions. Neurologic:  Neurovascularly intact without any focal sensory/motor deficits. Cranial nerves: II-XII intact, grossly non-focal.  Psychiatric: Alert and oriented x4, thought content appropriate, normal insight  Capillary Refill: Brisk,< 3 seconds   Peripheral Pulses: +2 palpable, equal bilaterally     Labs:   Recent Labs     08/05/22  0706   WBC 6.7   HGB 15.9   HCT 49.5*        Recent Labs     08/05/22  0706 08/06/22  0605 08/07/22  0517    139 142   K 3.6 3.8 5.1    101 102   CO2 21* 26 29   BUN 16 15 14   CREATININE 1.0 1.0 1.0   CALCIUM 9.1 9.0 9.4     No results for input(s): AST, ALT, BILIDIR, BILITOT, ALKPHOS in the last 72 hours. No results for input(s): INR in the last 72 hours. No results for input(s): Adonica Hoose in the last 72 hours.     Microbiology:    Blood culture #1: No results found for: BC    Blood culture #2:No results found for: Peder Partida    Organism:No results found for: ORG    No results found for: LABGRAM    MRSA culture only:No results found for: [facility]    Urine culture: No results found for: LABURIN    Respiratory culture: No results found for: CULTRESP    Aerobic and Anaerobic :  No results found for: LABAERO  No results found for: LABANAE    Urinalysis:    No results found for: Mone Castellani, BACTERIA, RBCUA, BLOODU, SPECGRAV, GLUCOSEU    Radiology:  CTA [address]   Final Result      1. No evidence of pulmonary emboli. 2. Mild cardiomegaly. 3. Moderate right and small left pleural effusions. 4. Areas of atelectasis or infiltrate in the right and left lower lobes. 5. Bilateral breast implants in place. **This report has been created using voice recognition software. It may contain minor errors which are inherent in voice recognition technology. **      Final report electronically signed by DR Caitlin Ewing on 8/3/2022 5:00 PM        CTA CHEST W WO CONTRAST    Result Date: 8/3/2022  PROCEDURE: CTA CHEST W WO CONTRAST CLINICAL INFORMATION: pulmonary embolism. COMPARISON: No prior study. TECHNIQUE: 3 mm axial images were obtained through the chest after the administration of IV contrast.  A non-contrast localizer was obtained. 3D reconstructions were performed on the scanner to include MIP coronal and sagittal images through the chest. Isovue was the intravenous contrast utilized. All CT scans at this facility use dose modulation, iterative reconstruction, and/or weight-based dosing when appropriate to reduce radiation dose to as low as reasonably achievable. FINDINGS: There is adequate opacification of the pulmonary arterial system. No pulmonary emboli are present. The aorta is within acceptable limits. There is mild cardiomegaly. . There is no pericardial  effusion. There is a moderate right and small left pleural effusion present. There is no mediastinal, axillary or hilar adenopathy. . There are areas of atelectasis or infiltrate in the right and left lower lobes. No suspicious osseous lesions are present. There are bilateral breast implants present.  There are no suspicious findings in the imaged aspects of the upper abdomen. 1. No evidence of pulmonary emboli. 2. Mild cardiomegaly. 3. Moderate right and small left pleural effusions. 4. Areas of atelectasis or infiltrate in the right and left lower lobes. 5. Bilateral breast implants in place. **This report has been created using voice recognition software. It may contain minor errors which are inherent in voice recognition technology. ** Final report electronically signed by DR Brenda Salcedo on 8/3/2022 5:00 PM      Electronically signed by Evgeny Castrejon PA-C on 8/7/2022 at 1:08 PM

## 2022-08-07 NOTE — PLAN OF CARE
Problem: Discharge Planning  Goal: Discharge to home or other facility with appropriate resources  Outcome: Progressing  Note: Pt will be discharged home when ready. Problem: Pain  Goal: Verbalizes/displays adequate comfort level or baseline comfort level  Outcome: Progressing  Note: Pt showing improvement. Problem: Safety - Adult  Goal: Free from fall injury  Outcome: Progressing  Note: Patient uses call light for all needs. Overbed table within reach. Call light within reach. Hourly rounds complete and needs are made known during rounds. Problem: Cardiovascular - Adult  Goal: Maintains optimal cardiac output and hemodynamic stability  Outcome: Progressing  Note: VSS. Goal: Absence of cardiac dysrhythmias or at baseline  Outcome: Progressing  Note: No issues noted so far this shift. Problem: Chronic Conditions and Co-morbidities  Goal: Patient's chronic conditions and co-morbidity symptoms are monitored and maintained or improved  Outcome: Progressing  Note: Showing improvement. Care plan reviewed with patient. Patient verbalize understanding of the plan of care and contribute to goal setting.

## 2022-08-08 VITALS
BODY MASS INDEX: 29.36 KG/M2 | OXYGEN SATURATION: 97 % | HEIGHT: 63 IN | HEART RATE: 84 BPM | SYSTOLIC BLOOD PRESSURE: 118 MMHG | DIASTOLIC BLOOD PRESSURE: 85 MMHG | RESPIRATION RATE: 20 BRPM | TEMPERATURE: 97.6 F | WEIGHT: 165.7 LBS

## 2022-08-08 LAB
ANION GAP SERPL CALCULATED.3IONS-SCNC: 10 MEQ/L (ref 8–16)
BUN BLDV-MCNC: 12 MG/DL (ref 7–22)
CALCIUM SERPL-MCNC: 9.6 MG/DL (ref 8.5–10.5)
CHLORIDE BLD-SCNC: 100 MEQ/L (ref 98–111)
CO2: 31 MEQ/L (ref 23–33)
CREAT SERPL-MCNC: 1 MG/DL (ref 0.4–1.2)
GFR SERPL CREATININE-BSD FRML MDRD: 58 ML/MIN/1.73M2
GLUCOSE BLD-MCNC: 109 MG/DL (ref 70–108)
POTASSIUM SERPL-SCNC: 4.7 MEQ/L (ref 3.5–5.2)
SODIUM BLD-SCNC: 141 MEQ/L (ref 135–145)

## 2022-08-08 PROCEDURE — 6370000000 HC RX 637 (ALT 250 FOR IP): Performed by: PHYSICIAN ASSISTANT

## 2022-08-08 PROCEDURE — 80048 BASIC METABOLIC PNL TOTAL CA: CPT

## 2022-08-08 PROCEDURE — 99239 HOSP IP/OBS DSCHRG MGMT >30: CPT | Performed by: PHYSICIAN ASSISTANT

## 2022-08-08 PROCEDURE — 93005 ELECTROCARDIOGRAM TRACING: CPT | Performed by: PHYSICIAN ASSISTANT

## 2022-08-08 PROCEDURE — 6370000000 HC RX 637 (ALT 250 FOR IP): Performed by: STUDENT IN AN ORGANIZED HEALTH CARE EDUCATION/TRAINING PROGRAM

## 2022-08-08 PROCEDURE — 36415 COLL VENOUS BLD VENIPUNCTURE: CPT

## 2022-08-08 PROCEDURE — 2580000003 HC RX 258: Performed by: PHYSICIAN ASSISTANT

## 2022-08-08 RX ORDER — DIGOXIN 125 MCG
125 TABLET ORAL DAILY
Qty: 30 TABLET | Refills: 3 | Status: SHIPPED | OUTPATIENT
Start: 2022-08-09

## 2022-08-08 RX ORDER — METOPROLOL SUCCINATE 50 MG/1
50 TABLET, EXTENDED RELEASE ORAL DAILY
Qty: 30 TABLET | Refills: 3 | Status: SHIPPED | OUTPATIENT
Start: 2022-08-09 | End: 2022-10-03

## 2022-08-08 RX ORDER — FUROSEMIDE 20 MG/1
20 TABLET ORAL 2 TIMES DAILY
Qty: 60 TABLET | Refills: 3 | Status: SHIPPED | OUTPATIENT
Start: 2022-08-08 | End: 2022-08-15

## 2022-08-08 RX ADMIN — SODIUM CHLORIDE, PRESERVATIVE FREE 10 ML: 5 INJECTION INTRAVENOUS at 08:25

## 2022-08-08 RX ADMIN — FUROSEMIDE 20 MG: 20 TABLET ORAL at 08:25

## 2022-08-08 RX ADMIN — VENLAFAXINE HYDROCHLORIDE 150 MG: 150 CAPSULE, EXTENDED RELEASE ORAL at 08:25

## 2022-08-08 RX ADMIN — METOPROLOL SUCCINATE 50 MG: 50 TABLET, EXTENDED RELEASE ORAL at 08:25

## 2022-08-08 RX ADMIN — LEVOTHYROXINE SODIUM 75 MCG: 0.07 TABLET ORAL at 05:48

## 2022-08-08 RX ADMIN — SACUBITRIL AND VALSARTAN 1 TABLET: 24; 26 TABLET, FILM COATED ORAL at 08:24

## 2022-08-08 RX ADMIN — DIGOXIN 125 MCG: 125 TABLET ORAL at 08:25

## 2022-08-08 NOTE — DISCHARGE SUMMARY
Hospitalist Discharge Summary        Patient: Constantino Wang  YOB: 1970  MRN: [id number]   Acct: [de-identified]    Primary Care Physician: Clover Santiago MD    Admit date  8/3/2022    Discharge date: 8/8/2022  5:19 PM    Chief Complaint on presentation :-  SOB    Discharge Assessment and Plan:-   New onset severe CMP / HFrEF: Cardiology consulted. Unclear etiology, ruled out ischemic etiology with LHC/RHC for further evaluation today. Suspect potential viral etiology. CRP / ESR sent. ECHO returned with EF 10-15%. Digoxin 125 mcg started. GDMT with BB / Lasix. If BP tolerates will start Entresto later 8/5. May consider adding aldactone and SGLT2 if able to tolerate later in clinical course. Low Na diet / fluid restriction. Strict I/Os. Daily weights. Pt will need LifeVest prior to discharge  Will need f/u for cardiac     8/6: given insurance issues, pt denied LifeVest, they have 72 hours to review data and make a further decision. Discharge will be delayed until mid-next week at the earliest.     Essential HTN: no hx, started on above med regimen, monitor BP closely  Acquired hypothyroidism: Synthroid  Anxiety / depression: Effexor       Initial H and P and Hospital course:-  Yvette Johnston is a 47 y.o. female with PMHx of depression, hypothyroidism who presented to [facility] with chief complaint of SOB, CP, leg swelling. Patient reports intermittent chest pain and shortness of breath for the past few weeks. Chest pain is described as pressure, worse with exertion, resolves with rest.  Patient also reports shortness of breath with exertion and orthopnea. Patient reports leg swelling that started yesterday. She states she went to her PCP today and had an abnormal EKG and was sent to the ED. Patient reports abdominal pain and nausea which she states are chronic. Patient denies fever/chills, cough, recent illness, vomiting/diarrhea, urinary symptoms.   Patient is not having chest pain at this time.  Patient admitted to hospitalist service with cardiology consult for further management\"     8/4: pt sitting up in bed, reports that her b/l LE swelling is all the way up her legs. Denies CP, SOB with exertion only. No abd pain, n/v. Plan Cath today. 8/5: pt doing alright, she is trying to process what has been happening to her and admits to feeling overwhelmed. Denies any CP/palpitations. Denies SOB currently and reports that her b/l LE swelling is improving. Continue diuresis and hopefully discharge in the next 1-2 days. 8/6: no new complaints, denies CP/SOB. Delay in discharge due to insurance denying LifeVest. BP has remained stable with addition of Entresto. 8/7: pt reports feeling very tired today, no other changes. No CP/SOB. Patient was admitted secondary to shortness of breath and increased bilateral lower extremity swelling. Patient had an echocardiogram done which returned with an EF of 10 to 15%. Patient is diagnosed with new onset severe CMP/HFrEF. Cardiology was consulted and following patient did undergo a left heart cath which showed patent coronaries 8/4. It was felt that patient's presentation was likely from a viral etiology. She was educated regarding sodium restriction daily along with a 2 L fluid restriction. Patient was fitted for a LifeVest, there was some apprehension the patient's insurance would not cover this and so discharge has been delayed. Patient was discharged with Toprol 50 mg daily and on Entresto twice daily. She additionally was continued on digoxin and Lasix 20 mg twice daily. BP was monitored with the addition of these medications and remained stable patient will need to follow-up with cardiology at which time we will consider Aldactone and Farxiga. Patient will need follow-up in the CHF clinic. Also, recommended that she have a cardiac MRI after discharge. This can be done at [facility]. Will defer to cardiology to get this set up.   At time of discharge, patient's bilateral lower extremity swelling was much improved. Clinically she was much improved and she felt comfortable returning home. All VSS and suitable for discharge home. Physical Exam:-  Vitals:   Patient Vitals for the past 24 hrs:   BP Temp Temp src Pulse Resp SpO2   08/08/22 1158 125/86 97.8 °F (36.6 °C) Oral 89 18 98 %   08/08/22 0815 125/86 97.9 °F (36.6 °C) Oral 89 16 96 %   08/08/22 0400 119/88 97.9 °F (36.6 °C) Oral 79 16 100 %   08/07/22 2115 (!) 120/90 98.2 °F (36.8 °C) Oral 80 18 100 %     Weight:   Weight: 165 lb 11.2 oz (75.2 kg)   24 hour intake/output:     Intake/Output Summary (Last 24 hours) at 8/8/2022 1343  Last data filed at 8/8/2022 5737  Gross per 24 hour   Intake 860 ml   Output 800 ml   Net 60 ml       General appearance: Pleasant, no apparent distress, appears stated age and cooperative. HEENT: Normal cephalic, atraumatic without obvious deformity. Pupils equal, round, and reactive to light. Extra ocular muscles intact. Conjunctivae/corneas clear. Neck: Supple, with full range of motion. No jugular venous distention. Trachea midline. Respiratory:  Normal respiratory effort. Clear to auscultation, bilaterally without Rales/Wheezes/Rhonchi. Cardiovascular: Regular rate and rhythm with normal S1/S2 without murmurs, rubs or gallops. Abdomen: Soft, non-tender, non-distended with normal bowel sounds. Musculoskeletal:  No clubbing, cyanosis, improving +1 non-pitting edema bilaterally. Skin: Skin color, texture, turgor normal.  No rashes or lesions. Neurologic:  Neurovascularly intact without any focal sensory/motor deficits.  Cranial nerves: II-XII intact, grossly non-focal.  Psychiatric: Alert and oriented x4, thought content appropriate, normal insight  Capillary Refill: Brisk,< 3 seconds   Peripheral Pulses: +2 palpable, equal bilaterally       Discharge Medications:-      Medication List        START taking these medications      digoxin 125 MCG tablet  Commonly known as: LANOXIN  Take 1 tablet by mouth in the morning. Start taking on: August 9, 2022     furosemide 20 MG tablet  Commonly known as: LASIX  Take 1 tablet by mouth in the morning and 1 tablet in the evening. metoprolol succinate 50 MG extended release tablet  Commonly known as: TOPROL XL  Take 1 tablet by mouth in the morning. Start taking on: August 9, 2022     sacubitril-valsartan 24-26 MG per tablet  Commonly known as: ENTRESTO  Take 1 tablet by mouth in the morning and 1 tablet before bedtime. CONTINUE taking these medications      acetaminophen 500 MG tablet  Commonly known as: TYLENOL     ibuprofen 200 MG tablet  Commonly known as: ADVIL;MOTRIN     levothyroxine 75 MCG tablet  Commonly known as: Synthroid  TAKE 1 TABLET DAILY            ASK your doctor about these medications      amphetamine-dextroamphetamine 15 MG extended release capsule  Commonly known as: Adderall XR  Take 1 capsule by mouth every morning for 30 days.      venlafaxine 150 MG extended release capsule  Commonly known as: EFFEXOR XR  TAKE 1 CAPSULE ONCE DAILY               Where to Get Your Medications        These medications were sent to [facility] [address], [address]  [facility] Floor, [address] [facility] [address]      Phone: [phone]   digoxin 125 MCG tablet  furosemide 20 MG tablet  metoprolol succinate 50 MG extended release tablet  sacubitril-valsartan 24-26 MG per tablet          Labs :-  Recent Results (from the past 72 hour(s))   Basic Metabolic Panel    Collection Time: 08/06/22  6:05 AM   Result Value Ref Range    Sodium 139 135 - 145 meq/L    Potassium 3.8 3.5 - 5.2 meq/L    Chloride 101 98 - 111 meq/L    CO2 26 23 - 33 meq/L    Glucose 113 (H) 70 - 108 mg/dL    BUN 15 7 - 22 mg/dL    Creatinine 1.0 0.4 - 1.2 mg/dL    Calcium 9.0 8.5 - 10.5 mg/dL   Anion Gap    Collection Time: 08/06/22  6:05 AM   Result Value Ref Range    Anion Gap 12.0 8.0 - 16.0 meq/L   Glomerular Filtration Rate, Estimated    Collection Time: 08/06/22  6:05 AM   Result Value Ref Range    Est, Glom Filt Rate 58 (A) VN/[place]/4.14D3   Basic Metabolic Panel    Collection Time: 08/07/22  5:17 AM   Result Value Ref Range    Sodium 142 135 - 145 meq/L    Potassium 5.1 3.5 - 5.2 meq/L    Chloride 102 98 - 111 meq/L    CO2 29 23 - 33 meq/L    Glucose 119 (H) 70 - 108 mg/dL    BUN 14 7 - 22 mg/dL    Creatinine 1.0 0.4 - 1.2 mg/dL    Calcium 9.4 8.5 - 10.5 mg/dL   Anion Gap    Collection Time: 08/07/22  5:17 AM   Result Value Ref Range    Anion Gap 11.0 8.0 - 16.0 meq/L   Glomerular Filtration Rate, Estimated    Collection Time: 08/07/22  5:17 AM   Result Value Ref Range    Est, Glom Filt Rate 58 (A) ml/min/1.73m2   EKG 12 Lead    Collection Time: 08/08/22  4:49 AM   Result Value Ref Range    Ventricular Rate 77 BPM    Atrial Rate 77 BPM    P-R Interval 190 ms    QRS Duration 156 ms    Q-T Interval 414 ms    QTc Calculation (Bazett) 468 ms    P Axis 35 degrees    R Axis -80 degrees    T Axis 49 degrees   Basic Metabolic Panel    Collection Time: 08/08/22  5:58 AM   Result Value Ref Range    Sodium 141 135 - 145 meq/L    Potassium 4.7 3.5 - 5.2 meq/L    Chloride 100 98 - 111 meq/L    CO2 31 23 - 33 meq/L    Glucose 109 (H) 70 - 108 mg/dL    BUN 12 7 - 22 mg/dL    Creatinine 1.0 0.4 - 1.2 mg/dL    Calcium 9.6 8.5 - 10.5 mg/dL   Anion Gap    Collection Time: 08/08/22  5:58 AM   Result Value Ref Range    Anion Gap 10.0 8.0 - 16.0 meq/L   Glomerular Filtration Rate, Estimated    Collection Time: 08/08/22  5:58 AM   Result Value Ref Range    Est, Glom Filt Rate 58 (A) ml/min/1.73m2        Microbiology:    Blood culture #1: No results found for: BC    Blood culture #2:No results found for: BLOODCULT2    Organism:  No results found for: LABGRAM    MRSA culture only:No results found for: [facility]    Urine culture: No results found for: LABURIN  No results found for: ORG     Respiratory culture:  No results found for: CULTRESP    Aerobic and Anaerobic :  No results found for: LABAERO  No results found for: LABANAE    Urinalysis:    No results found for: [place], BACTERIA, RBCUA, BLOODU, SPECGRAV, [address] [address]    Radiology:-  ECHO Complete 2D W Doppler W Color    Result Date: 8/4/2022  Transthoracic Echocardiography Report (TTE)  Demographics   Patient Name  Jesus Montoya Gender             Female   MR #          [id number]    Race                                             Ethnicity   Account #     [de-identified]    Room Number        0025   Accession     [id number]   Date of Study      08/04/2022  Number   Date of Birth 1970   Referring          Lars Trinidad MD                             Physician          Musa Acevedo PA-C   Age           47 year(s)   Sonographer        JUANA Modi, RDCS,                                                RDMS, RVT                              Interpreting       Elizabeth Marcelino MD                             Physician  Procedure Type of Study   TTE procedure:ECHOCARDIOGRAM COMPLETE 2D W DOPPLER W COLOR. Procedure Date Date: 08/04/2022 Start: 06:18 AM Study Location: Bedside Technical Quality: Adequate visualization Indications:Dyspnea on exertion and Chest pain. Patient Status: Routine Height: 62 inches Weight: 169 pounds BSA: 1.78 m^2 BMI: 30.91 kg/m^2 BP: 134/96 mmHg  Conclusions   Summary  Moderately dilated left ventricular size and severely reduce systolic  function. There was severe global hypokinesis. Wall thickness was within normal limits. Ejection fraction was estimated at 10-15%. The right ventricular size was normal with moderately reduced systolic  function and normal wall thickness. There was trace mitral regurgitation. There was mild tricuspid regurgitation. Assuming RAP = 10 mmHg, the  estimated RVSP = 29 mmHg.   IVC size is dilated with reduced respiratory phasic changes (CVP~5-10  mmHg)   Signature ----------------------------------------------------------------  Electronically signed by Kristina Haddad MD (Interpreting  physician) on 08/04/2022 at 01:12 PM  ----------------------------------------------------------------   Findings   Mitral Valve  The mitral valve structure was normal with normal leaflet separation. DOPPLER: The transmitral velocity was within the normal range with no  evidence for mitral stenosis. There was trace mitral regurgitation. Aortic Valve  The aortic valve was trileaflet with normal thickness and cuspal  separation. DOPPLER: Transaortic velocity was within the normal range with  no evidence of aortic stenosis. There was no evidence of aortic  regurgitation. Tricuspid Valve  The tricuspid valve structure was normal with normal leaflet separation. DOPPLER: There was no evidence of tricuspid stenosis. There was mild  tricuspid regurgitation. Assuming RAP = 10 mmHg, the estimated RVSP = 29  mmHg. Pulmonic Valve  The pulmonic valve leaflets were not well seen. DOPPLER: The transpulmonic  velocity was within the normal range with no evidence for regurgitation. Left Atrium  Left atrial size was normal.   Left Ventricle  Moderately dilated left ventricular size and severely reduce systolic  function. There was severe global hypokinesis. Wall thickness was within normal limits. Ejection fraction was estimated at 10-15%. Right Atrium  Right atrial size was normal.   Right Ventricle  The right ventricular size was normal with moderately reduced systolic  function and normal wall thickness. Pericardial Effusion  The pericardium was normal in appearance with no evidence of a pericardial  effusion. Pleural Effusion  No evidence of pleural effusion.    Aorta / Great Vessels  IVC size is dilated with reduced respiratory phasic changes (CVP~5-10  mmHg)  M-Mode/2D Measurements & Calculations   LV Diastolic   LV Systolic Dimension:    AV Cusp Separation: 1.8 cmLA  Dimension: 5.6 5.2 cm                    Dimension: 3.7 cmAO Root  cm             LV Volume Diastolic: 356  Dimension: 2.8 cmLA Area: 17.5  LV FS:7.1 %    ml                        cm^2  LV PW          LV Volume Systolic: 966.3  Diastolic: 1.3 ml  cm             LV EDV/LV EDV Index: 131  Septum         ml/74 m^2LV ESV/LV ESV    RV Diastolic Dimension: 3.2 cm  Diastolic: 1.1 Index: 306.0 ml/66 m^2  cm             EF Calculated: 10.4 %     LA/Aorta: 1.32                                           Ascending Aorta: 2.9 cm                 LV Length: 8.5 cm         LA volume/Index: 48.4 ml /27m^2   LV Area  Diastolic:  60.4 cm^2  LV Area  Systolic: 68.3  cm^2  Doppler Measurements & Calculations   MV Peak E-Wave: 78.4     AV Peak Velocity: 77.3 LVOT Peak Velocity: 49.3  cm/s                     cm/s                   cm/s                           AV Peak Gradient: 2.39 LVOT Peak Gradient: 1 mmHg  MV Peak Gradient: 2.46   mmHg  mmHg                                            TV Peak E-Wave: 59.1 cm/s   MV Deceleration Time:                           TV Peak Gradient: 1.4 mmHg  134 msec                                        TR Velocity:217 cm/s                           AV P1/2t: 1010 msec    TR Gradient:18.84 mmHg                           IVRT: 148 msec         PV Peak Velocity: 47.1                                                  cm/s                                                  PV Peak Gradient: 0.89                           AV DVI (Vmax):0.64     mmHg  MR Velocity: 328 cm/s                                                   ID ED Velocity: 141 cm/s  [id number]    CTA CHEST W WO CONTRAST    Result Date: 8/3/2022  PROCEDURE: CTA CHEST W WO CONTRAST CLINICAL INFORMATION: pulmonary embolism. COMPARISON: No prior study.  TECHNIQUE: 3 mm axial images were obtained through the chest after the administration of IV contrast.  A non-contrast localizer was obtained. 3D reconstructions were performed on the scanner to include MIP coronal and sagittal images through the chest. Isovue was the intravenous contrast utilized. All CT scans at this facility use dose modulation, iterative reconstruction, and/or weight-based dosing when appropriate to reduce radiation dose to as low as reasonably achievable. FINDINGS: There is adequate opacification of the pulmonary arterial system. No pulmonary emboli are present. The aorta is within acceptable limits. There is mild cardiomegaly. . There is no pericardial  effusion. There is a moderate right and small left pleural effusion present. There is no mediastinal, axillary or hilar adenopathy. . There are areas of atelectasis or infiltrate in the right and left lower lobes. No suspicious osseous lesions are present. There are bilateral breast implants present. There are no suspicious findings in the imaged aspects of the upper abdomen. 1. No evidence of pulmonary emboli. 2. Mild cardiomegaly. 3. Moderate right and small left pleural effusions. 4. Areas of atelectasis or infiltrate in the right and left lower lobes. 5. Bilateral breast implants in place. **This report has been created using voice recognition software. It may contain minor errors which are inherent in voice recognition technology. ** Final report electronically signed by DR Raman Posadas on 8/3/2022 5:00 PM       Follow-up scheduled after discharge :-    in the next few days with Higinio Stewart MD  in 1-2 weeks with CHF  In 1-2 weeks with Cardiology     Consultations during this hospital stay:-  [] NONE [x] Cardiology  [] Nephrology  [] Hemo onco   [] GI   [] ID  [] Endocrine  [] Pulm    [] Neuro    [] Psych   [] Urology  [] ENT   [] G SURGERY   []Ortho    []CV surg    [] Palliative  [] Hospice [] Pain management   []    []TCU   [] PT/OT  OTHERS:-    Disposition: home  Condition at Discharge: Stable    Time Spent:- 40 minutes    Electronically signed by Matt Willingham PA-C on 8/8/22 at 1:43 PM EDT   Discharging Hospitalist

## 2022-08-08 NOTE — CARE COORDINATION
8/8/22, 7:08 AM EDT    DISCHARGE ON GOING EVALUATION    [facility] [address] day: 3  Location: 8B-25/025-A Reason for admit: SOB (shortness of breath) [R06.02]  New onset of congestive heart failure ([facility] [address] [I50.9]   Procedure:  8-3-22 CTA CHest w/o Contrast.   1. No evidence of pulmonary emboli. 2. Mild cardiomegaly. 3. Moderate right and small left pleural effusions. 4. Areas of atelectasis or infiltrate in the right and left lower lobes. 5. Bilateral breast implants in place. 8-4-22 Echo with EF 10-15%. Cardiac Cath performed. Barriers to Discharge: Spoke with Rex Frazier from [employer] this morning. She states pt was fitted for Life Vest Friday afternoon 8-5. [place] [address] has worked agreement with [facility] and will proceed with appeal process with [employer]. PCP: Sidney Pack MD  Readmission Risk Score: 3.5%  Patient Goals/Plan/Treatment Preferences: Plans home with spouse and new [address] To follow up with CHF Clinic.     8/8/22, 11:33 AM EDT    Patient goals/plan/ treatment preferences discussed by  and . Patient goals/plan/ treatment preferences reviewed with patient/ family. Patient/ family verbalize understanding of discharge plan and are in agreement with goal/plan/treatment preferences. Understanding was demonstrated using the teach back method. AVS provided by RN at time of discharge, which includes all necessary medical information pertaining to the patients current course of illness, treatment, post-discharge goals of care, and treatment preferences. Services At/After Discharge: DME [employer] Life Vest.           Discharge planned for today.  Plans home with spouse and ron [place]

## 2022-08-08 NOTE — PROGRESS NOTES
Educated patient regarding heart failure management by explaining the importance of obtaining daily weights at the same time every day with approximately the same amount of clothing, how to recognize symptoms, low sodium diet and taking prescribed medications as ordered. Patient was given our heart failure booklet, a weight chart and a new patient appointment sched in OP CHF clinic. Patient was receptive to the education given and verbalized understanding. Patient  at bedside at time of education.

## 2022-08-09 ENCOUNTER — CARE COORDINATION (OUTPATIENT)
Dept: CASE MANAGEMENT | Age: 52
End: 2022-08-09

## 2022-08-09 DIAGNOSIS — I50.9 NEW ONSET OF CONGESTIVE HEART FAILURE (HCC): Primary | ICD-10-CM

## 2022-08-09 LAB
EKG ATRIAL RATE: 77 BPM
EKG P AXIS: 35 DEGREES
EKG P-R INTERVAL: 190 MS
EKG Q-T INTERVAL: 414 MS
EKG QRS DURATION: 156 MS
EKG QTC CALCULATION (BAZETT): 468 MS
EKG R AXIS: -80 DEGREES
EKG T AXIS: 49 DEGREES
EKG VENTRICULAR RATE: 77 BPM

## 2022-08-09 PROCEDURE — 93010 ELECTROCARDIOGRAM REPORT: CPT | Performed by: INTERNAL MEDICINE

## 2022-08-09 NOTE — CARE COORDINATION
Janel 45 Transitions Initial Follow Up Call    Call within 2 business days of discharge: Yes    Patient: Alexx Sharpe Patient : 1970   MRN: [id number]  Reason for Admission: New onset CHF  Discharge Date: 22 RARS: Readmission Risk Score: 4.2      Last Discharge [facility]       Date Complaint Diagnosis Description Type Department Provider    8/3/22 Chest Pain; Shortness of Breath Pleural effusion . .. ED to Hosp-Admission (Discharged) (ADMITTED) GISELE 8B Valentin Anaya PA-C; Nadiya... Lisa returned call, said she is feeling pretty good, just really tired. Denies fever, chest pain, SOB. Radial sites are without s/s of infection. Reviewed AVS post heart cath instructions and CHF zone. Will see CHF clinic next week. Instructed on the importance of weighing daily, in the morning after urinating, and that if she has weight gain of 2-3# over night or 5# weight gain in a week to call her physician immediately and report. She weighed herself this morning and will continue to do so. Was given a wt chart to document while in the hospital.  Is aware of fluid restriction and low Na+ diet. Offered dietician but said she will wait until she sees CHF clinic. She has been researching herself about what she can eat. She hasn't been really hungry but is eating ok. Reviewed medications/changes. No issues with cost of Entresto right now, pharmacy gave her a coupon for next month refill. Will see PCP this week. Denies other needs. No other questions or concerns at this time. Will continue to follow.     Non-face-to-face services provided:  Scheduled appointment with PCP-  Scheduled appointment with Caleb   Obtained and reviewed discharge summary and/or continuity of care documents    Care Transitions 24 Hour Call    Schedule Follow Up Appointment with PCP: Completed  Do you have a copy of your discharge instructions?: Yes  Do you have all of your prescriptions and are they filled?: Yes  Have you been contacted by a [address]?: No  Have you scheduled your follow up appointment?: Yes  How are you going to get to your appointment?: Car - family or friend to transport  Do you feel like you have everything you need to keep you well at home?: Yes  Care Transitions Interventions     Transportation Support: Declined     Registered Dietician: Declined    CHF Nurse: Completed           Transitions of Care Initial Call    Challenges to be reviewed by the provider   Additional needs identified to be addressed with provider: No  none             Method of communication with provider : none    Advance Care Planning:   Does patient have an Advance Directive:  will ask next call . Care Transition Nurse contacted the patient by telephone to perform post hospital discharge assessment. Verified name and  with patient as identifiers. Provided introduction to self, and explanation of the CTN role. CTN reviewed discharge instructions, medical action plan and red flags with patient who verbalized understanding. Patient given an opportunity to ask questions and does not have any further questions or concerns at this time. Were discharge instructions available to patient? Yes. Reviewed appropriate site of care based on symptoms and resources available to patient including: PCP  Specialist. The patient agrees to contact the PCP office for questions related to their healthcare. Medication reconciliation was performed with patient, who verbalizes understanding of administration of home medications. Advised obtaining a 90-day supply of all daily and as-needed medications. Was patient discharged with a pulse oximeter? no    CTN provided contact information. Plan for follow-up call in 5-7 days based on severity of symptoms and risk factors.   Plan for next call: symptom management-CHF  follow up appointment-review notes from PCP and CHF clinic  Ask about ACP      Follow Up  Future Appointments   Date Time Provider Basil Gresham   8/11/2022  1:15 PM Ap Gomez MD [facility] [place]   8/15/2022 10:30 AM DILIP Iniguez - BLESSING Felipe CHF [facility]   8/25/2022  2:30 PM David Limon MD [facility] - Tiara Aguiar RN

## 2022-08-11 ENCOUNTER — OFFICE VISIT (OUTPATIENT)
Dept: FAMILY MEDICINE CLINIC | Age: 52
End: 2022-08-11
Payer: COMMERCIAL

## 2022-08-11 VITALS
WEIGHT: 153.8 LBS | BODY MASS INDEX: 27.51 KG/M2 | RESPIRATION RATE: 16 BRPM | TEMPERATURE: 97.3 F | DIASTOLIC BLOOD PRESSURE: 68 MMHG | OXYGEN SATURATION: 98 % | SYSTOLIC BLOOD PRESSURE: 122 MMHG | HEART RATE: 83 BPM

## 2022-08-11 DIAGNOSIS — B33.24 VIRAL CARDIOMYOPATHY (HCC): Primary | ICD-10-CM

## 2022-08-11 DIAGNOSIS — Z09 HOSPITAL DISCHARGE FOLLOW-UP: ICD-10-CM

## 2022-08-11 DIAGNOSIS — E03.4 HYPOTHYROIDISM DUE TO ACQUIRED ATROPHY OF THYROID: ICD-10-CM

## 2022-08-11 DIAGNOSIS — I50.21 REDUCED EJECTION FRACTION CONCURRENT WITH AND DUE TO ACUTE HEART FAILURE (HCC): ICD-10-CM

## 2022-08-11 PROCEDURE — 99496 TRANSJ CARE MGMT HIGH F2F 7D: CPT | Performed by: FAMILY MEDICINE

## 2022-08-11 PROCEDURE — 1111F DSCHRG MED/CURRENT MED MERGE: CPT | Performed by: FAMILY MEDICINE

## 2022-08-11 ASSESSMENT — ENCOUNTER SYMPTOMS
CHEST TIGHTNESS: 0
DIARRHEA: 0
COUGH: 0
WHEEZING: 0
NAUSEA: 0
BACK PAIN: 0
ABDOMINAL PAIN: 0
BLOOD IN STOOL: 0
EYE PAIN: 0
RHINORRHEA: 0
SORE THROAT: 0
VOMITING: 0
SHORTNESS OF BREATH: 0
CONSTIPATION: 0

## 2022-08-11 NOTE — PROGRESS NOTES
Post-Discharge Transitional Care Follow Up      Miguel Michael   YOB: 1970    Date of Office Visit:  8/11/2022  Date of Hospital Admission: 8/3/22  Date of Hospital Discharge: 8/8/22  Readmission Risk Score (high >=14%. Medium >=10%):Readmission Risk Score: 4.2      Care management risk score Rising risk (score 2-5) and Complex Care (Scores >=6): No Risk Score On File     Non face to face  following discharge, date last encounter closed (first attempt may have been earlier): 08/09/2022     Call initiated 2 business days of discharge: Yes     Viral cardiomyopathy ([facility] [address]  -new onset, severely reduced EF of 10-15%, aggressive meds to improve heart function, follow up with chf clinic and cardiology. Life vest in place. Reduced ejection fraction concurrent with and due to acute heart failure ([address]  -new onset, severely reduced EF of 10-15%, aggressive meds to improve heart function, follow up with chf clinic and cardiology. Life vest in place. Hypothyroidism due to acquired atrophy of thyroid  -stable, controlled on synthroid  Medical Decision Making: high complexity  No follow-ups on file. Subjective:   HPI  Presented to [facility] increased d dimer    Inpatient course: Discharge summary reviewed- see chart. Interval history/Current status: found to have dilated cardiomyopathy and reduced EF of 10-15%. Meds started. Wearing lifevest.  Weight is coming down. Very fatigued. ,   Current smoker, pmh reviewed. Patient Active Problem List   Diagnosis    Depression    Cigarette nicotine dependence without complication    Hypothyroidism    SOB (shortness of breath)    New onset of congestive heart failure (HCC)    Pleural effusion       Medications listed as ordered at the time of discharge from hospital     Medication List            Accurate as of August 11, 2022  1:26 PM. If you have any questions, ask your nurse or doctor.                 CHANGE how you take these medications venlafaxine 150 MG extended release capsule  Commonly known as: EFFEXOR XR  TAKE 1 CAPSULE ONCE DAILY  What changed:   when to take this  additional instructions            CONTINUE taking these medications      acetaminophen 500 MG tablet  Commonly known as: TYLENOL     digoxin 125 MCG tablet  Commonly known as: LANOXIN  Take 1 tablet by mouth in the morning. furosemide 20 MG tablet  Commonly known as: LASIX  Take 1 tablet by mouth in the morning and 1 tablet in the evening. ibuprofen 200 MG tablet  Commonly known as: ADVIL;MOTRIN     levothyroxine 75 MCG tablet  Commonly known as: Synthroid  TAKE 1 TABLET DAILY     metoprolol succinate 50 MG extended release tablet  Commonly known as: TOPROL XL  Take 1 tablet by mouth in the morning. sacubitril-valsartan 24-26 MG per tablet  Commonly known as: ENTRESTO  Take 1 tablet by mouth in the morning and 1 tablet before bedtime. STOP taking these medications      amphetamine-dextroamphetamine 15 MG extended release capsule  Commonly known as: Adderall XR  Stopped by: Lucrecia Ryan MD               Medications marked \"taking\" at this time  Outpatient Medications Marked as Taking for the 8/11/22 encounter (Office Visit) with Lucrecia Ryan MD   Medication Sig Dispense Refill    digoxin (LANOXIN) 125 MCG tablet Take 1 tablet by mouth in the morning. 30 tablet 3    metoprolol succinate (TOPROL XL) 50 MG extended release tablet Take 1 tablet by mouth in the morning. 30 tablet 3    sacubitril-valsartan (ENTRESTO) 24-26 MG per tablet Take 1 tablet by mouth in the morning and 1 tablet before bedtime. 60 tablet 3    furosemide (LASIX) 20 MG tablet Take 1 tablet by mouth in the morning and 1 tablet in the evening.  60 tablet 3    levothyroxine (SYNTHROID) 75 MCG tablet TAKE 1 TABLET DAILY 90 tablet 3    venlafaxine (EFFEXOR XR) 150 MG extended release capsule TAKE 1 CAPSULE ONCE DAILY (Patient taking differently: 2 times daily TAKE 1 CAPSULE in morning 1 capsule in the evening) 90 capsule 3    ibuprofen (ADVIL;MOTRIN) 200 MG tablet Take 200 mg by mouth every 6 hours as needed for Pain      acetaminophen (TYLENOL) 500 MG tablet Take 500 mg by mouth every 6 hours as needed for Pain          Medications patient taking as of now reconciled against medications ordered at time of hospital discharge: Yes    Review of Systems   Constitutional:  Positive for fatigue. Negative for chills, fever and unexpected weight change. HENT:  Negative for congestion, ear pain, rhinorrhea and sore throat. Eyes:  Negative for pain and visual disturbance. Respiratory:  Negative for cough, chest tightness, shortness of breath and wheezing. Cardiovascular:  Negative for chest pain and palpitations. Gastrointestinal:  Negative for abdominal pain, blood in stool, constipation, diarrhea, nausea and vomiting. Genitourinary:  Negative for difficulty urinating, frequency, hematuria and urgency. Musculoskeletal:  Negative for back pain, joint swelling, myalgias and neck pain. Skin:  Negative for rash. Neurological:  Negative for dizziness and headaches. Hematological:  Negative for adenopathy. Does not bruise/bleed easily. Psychiatric/Behavioral:  Negative for behavioral problems and sleep disturbance. The patient is not nervous/anxious. Objective:    /68   Pulse 83   Temp 97.3 °F (36.3 °C) (Infrared)   Resp 16   Wt 153 lb 12.8 oz (69.8 kg)   SpO2 98%   BMI 27.51 kg/m²   Physical Exam  Vitals and nursing note reviewed. Constitutional:       Appearance: She is well-developed. HENT:      Head: Normocephalic and atraumatic. Right Ear: External ear normal.      Left Ear: External ear normal.      Nose: Nose normal.      Mouth/Throat:      Mouth: Mucous membranes are moist.   Eyes:      Pupils: Pupils are equal, round, and reactive to light. Neck:      Thyroid: No thyromegaly.    Cardiovascular:      Rate and Rhythm: Normal rate and regular rhythm. Heart sounds: Normal heart sounds. Pulmonary:      Breath sounds: Normal breath sounds. No wheezing or rales. Abdominal:      General: Bowel sounds are normal.      Palpations: Abdomen is soft. Tenderness: There is no abdominal tenderness. There is no guarding or rebound. Musculoskeletal:         General: Normal range of motion. Cervical back: Neck supple. Lymphadenopathy:      Cervical: No cervical adenopathy. Skin:     General: Skin is warm and dry. Findings: No rash. Neurological:      Mental Status: She is alert and oriented to person, place, and time. Cranial Nerves: No cranial nerve deficit. Deep Tendon Reflexes: Reflexes are normal and symmetric. An electronic signature was used to authenticate this note.   --Eddie Celestin MD

## 2022-08-15 ENCOUNTER — OFFICE VISIT (OUTPATIENT)
Dept: CARDIOLOGY CLINIC | Age: 52
End: 2022-08-15
Payer: COMMERCIAL

## 2022-08-15 VITALS
SYSTOLIC BLOOD PRESSURE: 117 MMHG | OXYGEN SATURATION: 100 % | HEIGHT: 62 IN | WEIGHT: 152.25 LBS | BODY MASS INDEX: 28.02 KG/M2 | DIASTOLIC BLOOD PRESSURE: 77 MMHG | HEART RATE: 76 BPM

## 2022-08-15 DIAGNOSIS — I50.22 CHF (CONGESTIVE HEART FAILURE), NYHA CLASS II, CHRONIC, SYSTOLIC (HCC): Primary | ICD-10-CM

## 2022-08-15 DIAGNOSIS — R53.83 FATIGUE, UNSPECIFIED TYPE: ICD-10-CM

## 2022-08-15 DIAGNOSIS — I42.8 NONISCHEMIC CARDIOMYOPATHY (HCC): ICD-10-CM

## 2022-08-15 PROCEDURE — 99214 OFFICE O/P EST MOD 30 MIN: CPT | Performed by: NURSE PRACTITIONER

## 2022-08-15 RX ORDER — FUROSEMIDE 20 MG/1
20 TABLET ORAL DAILY
Qty: 30 TABLET | Refills: 5 | Status: SHIPPED | OUTPATIENT
Start: 2022-08-15 | End: 2022-11-04

## 2022-08-15 RX ORDER — SPIRONOLACTONE 25 MG/1
25 TABLET ORAL DAILY
Qty: 30 TABLET | Refills: 5 | Status: SHIPPED | OUTPATIENT
Start: 2022-08-15 | End: 2022-08-25 | Stop reason: ALTCHOICE

## 2022-08-15 ASSESSMENT — ENCOUNTER SYMPTOMS
COUGH: 0
SHORTNESS OF BREATH: 1
ABDOMINAL DISTENTION: 0

## 2022-08-15 NOTE — PROGRESS NOTES
Heart Failure Clinic       Visit Date: 8/15/2022  Cardiologist:  Dr. Ray [facility]  Primary Care Physician: Dr. Tyrone Sheikh MD    Keshawn Nunn is a 47 y.o. female who presents today for:  Chief Complaint   Patient presents with    Congestive Heart Failure       HPI:   Keshawn Nunn is a 47 y.o. female who presents to the office for a NEW patient visit in the heart failure clinic. Accompanied by     TYPE HF: HFrEF (10-15%)  Cause: NICM - unknown etiology - ?possibly viral - no ETOH or family hx  Device: lifevest  HX: HLD, Hypothyroidism, Depression, former smoker    Dry Wt:  150    Hospitalization:  Aug 2022 - SOB, new severe CMP. Sinus tach - started Dig    Today: Down 20# in past 2 weeks - denies fluid overload. No fluid on exam  Symptoms improved but still very fatigued - had take break cleaning bedroom yesterday. ?viral induced - notes significant GI issues prior to admission.   No respiratory illness/fever  Minimal ETOH intake    Patient has:  Chest Pain: no  SOB: some LE  Orthopnea/PND: no  DELROY: no  Edema: no  Fatigue: yes   Abdominal bloating: no  Cough: no  Appetite: good  Home weight: stable  Home blood pressure: not check    Past Medical History:   Diagnosis Date    Depression     Hypercholesterolemia     Hypothyroidism     New onset of congestive heart failure ([address] 8/4/2022     Past Surgical History:   Procedure Laterality Date    ABLATION OF DYSRHYTHMIC FOCUS      BREAST SURGERY  2007    implant bilateral breast    TONSILLECTOMY      TUBAL LIGATION       Family History   Problem Relation Age of Onset    Cancer Father         Lung Cancer     Social History     Tobacco Use    Smoking status: Some Days     Packs/day: 0.10     Years: 20.00     Pack years: 2.00     Types: Cigarettes    Smokeless tobacco: Never   Substance Use Topics    Alcohol use: Yes     Comment: occassionally      Current Outpatient Medications   Medication Sig Dispense Refill    empagliflozin (JARDIANCE) 10 MG tablet Take 1 tablet by mouth in the morning. 30 tablet 5    spironolactone (ALDACTONE) 25 MG tablet Take 1 tablet by mouth in the morning. 30 tablet 5    furosemide (LASIX) 20 MG tablet Take 1 tablet by mouth in the morning. 30 tablet 5    digoxin (LANOXIN) 125 MCG tablet Take 1 tablet by mouth in the morning. 30 tablet 3    metoprolol succinate (TOPROL XL) 50 MG extended release tablet Take 1 tablet by mouth in the morning. 30 tablet 3    sacubitril-valsartan (ENTRESTO) 24-26 MG per tablet Take 1 tablet by mouth in the morning and 1 tablet before bedtime. 60 tablet 3    levothyroxine (SYNTHROID) 75 MCG tablet TAKE 1 TABLET DAILY 90 tablet 3    venlafaxine (EFFEXOR XR) 150 MG extended release capsule TAKE 1 CAPSULE ONCE DAILY (Patient taking differently: 2 times daily TAKE 1 CAPSULE in morning 1/2 capsule in the evening) 90 capsule 3    ibuprofen (ADVIL;MOTRIN) 200 MG tablet Take 200 mg by mouth every 6 hours as needed for Pain      acetaminophen (TYLENOL) 500 MG tablet Take 500 mg by mouth every 6 hours as needed for Pain       No current facility-administered medications for this visit. No Known Allergies    SUBJECTIVE:   Review of Systems   Constitutional:  Positive for fatigue. Negative for activity change and appetite change. Respiratory:  Positive for shortness of breath (LE). Negative for cough. Cardiovascular:  Negative for chest pain, palpitations and leg swelling. Gastrointestinal:  Negative for abdominal distention. Neurological:  Negative for weakness, light-headedness and headaches. Hematological:  Negative for adenopathy. Psychiatric/Behavioral:  Negative for sleep disturbance. OBJECTIVE:   Today's Vitals:  /77   Pulse 76   Ht 5' 2\" (1.575 m)   Wt 152 lb 4 oz (69.1 kg)   SpO2 100%   BMI 27.85 kg/m²     Physical Exam  Vitals reviewed. Constitutional:       General: She is not in acute distress. Appearance: Normal appearance. She is well-developed.  She is not diaphoretic. HENT:      Head: Normocephalic and atraumatic. Eyes:      Conjunctiva/sclera: Conjunctivae normal.   Neck:      Comments: No JVD  Cardiovascular:      Rate and Rhythm: Normal rate and regular rhythm. Heart sounds: Normal heart sounds. No murmur heard. Comments: Lifevest  Pulmonary:      Effort: Pulmonary effort is normal. No respiratory distress. Breath sounds: Normal breath sounds. No wheezing or rales. Abdominal:      General: Bowel sounds are normal. There is no distension. Palpations: Abdomen is soft. Tenderness: There is no abdominal tenderness. Musculoskeletal:         General: Normal range of motion. Cervical back: Normal range of motion and neck supple. Right lower leg: No edema. Left lower leg: No edema. Skin:     General: Skin is warm and dry. Capillary Refill: Capillary refill takes less than 2 seconds. Neurological:      Mental Status: She is alert and oriented to person, place, and time. Coordination: Coordination normal.   Psychiatric:         Behavior: Behavior normal.     Wt Readings from Last 3 Encounters:   08/15/22 152 lb 4 oz (69.1 kg)   08/11/22 153 lb 12.8 oz (69.8 kg)   08/07/22 165 lb 11.2 oz (75.2 kg)     BP Readings from Last 3 Encounters:   08/15/22 117/77   08/11/22 122/68   08/08/22 118/85     Pulse Readings from Last 3 Encounters:   08/15/22 76   08/11/22 83   08/08/22 84     Body mass index is 27.85 kg/m². ECHO:    Summary   Moderately dilated left ventricular size and severely reduce systolic   function. There was severe global hypokinesis. Wall thickness was within normal limits. Ejection fraction was estimated at 10-15%. The right ventricular size was normal with moderately reduced systolic   function and normal wall thickness. There was trace mitral regurgitation. There was mild tricuspid regurgitation. Assuming RAP = 10 mmHg, the   estimated RVSP = 29 mmHg.    IVC size is dilated with reduced respiratory phasic changes (CVP~5-10   mmHg)      Signature      ----------------------------------------------------------------   Electronically signed by Elvie Beebe MD (Interpreting   physician) on 08/04/2022 at 01:12 PM        CATH/STRESS:   RIGHT HEART PRESSURES:  1.  RA is 16.  2.  RV is 55/9, 22.  3.  PA is 53/32, 41.  4.  Wedge pressure is 29.  5.  LVEDP was measured to be 37 mmHg. 6.  AO sat was 87.  7.  PA is 57.  8.  Cardiac output was 2.88 and cardiac index was calculated at 1.6. The patient tolerated the procedure well. There were no immediate  complications. We gave IV Lasix on the table due to elevated LVEDP. She was transferred to her room in stable condition. SUMMARY:  1. Elevated right-sided filling pressures, elevated wedge pressure. 2.  Patent coronaries. RECOMMENDATIONS:  1. Continue IV diuresis. 2.  Monitor hemodynamics closely. 3.  Optimize heart failure therapies. 4. Lifevest on discharge  5.  CHF clinic referral           Sandi Bernal MD     D: 08/04/2022 16:57:09      Results reviewed:  BNP: No results found for: BNP  CBC:   Lab Results   Component Value Date/Time    WBC 6.7 08/05/2022 07:06 AM    RBC 5.24 08/05/2022 07:06 AM    RBC 5.26 04/30/2018 09:52 AM    HGB 15.9 08/05/2022 07:06 AM    HCT 49.5 08/05/2022 07:06 AM     08/05/2022 07:06 AM     CMP:    Lab Results   Component Value Date/Time     08/08/2022 05:58 AM    K 4.7 08/08/2022 05:58 AM    K 3.8 08/03/2022 04:10 PM     08/08/2022 05:58 AM    CO2 31 08/08/2022 05:58 AM    BUN 12 08/08/2022 05:58 AM    CREATININE 1.0 08/08/2022 05:58 AM    LABGLOM 58 08/08/2022 05:58 AM    GLUCOSE 109 08/08/2022 05:58 AM    GLUCOSE 90 04/30/2018 09:52 AM    CALCIUM 9.6 08/08/2022 05:58 AM     Hepatic Function Panel:    Lab Results   Component Value Date/Time    ALKPHOS 118 08/03/2022 04:10 PM    ALT 45 08/03/2022 04:10 PM    AST 32 08/03/2022 04:10 PM    PROT 6.5 08/03/2022 04:10 PM    BILITOT 1.1 08/03/2022 04:10 PM    BILIDIR 0.2 10/19/2011 08:05 AM    LABALBU 4.1 08/03/2022 04:10 PM    LABALBU 4.5 10/19/2011 08:05 AM     Magnesium:  No results found for: MG  PT/INR:    Lab Results   Component Value Date/Time    INR 1.24 08/04/2022 11:09 AM     Lipids:    Lab Results   Component Value Date/Time    TRIG 116 06/13/2022 11:57 AM    HDL 62 06/13/2022 11:57 AM    LDLCALC 121 06/13/2022 11:57 AM    LABVLDL 18 04/30/2018 09:52 AM       ASSESSMENT AND PLAN:      Diagnosis Orders   1. CHF (congestive heart failure), NYHA class II, chronic, systolic ([facility] [address]  [facility]    Echo limited    Basic Metabolic Panel    Digoxin Level    Brain Natriuretic Peptide      2. Nonischemic cardiomyopathy ([facility] [address]        3. Fatigue, unspecified type          Continue:  GDMT:   ACE/ARB/ARNI - Entresto 24/26 BID   BB - Toprol 50/day   SGLT2 -  no - ADD Jardiance 10/day  AA - no - ADD Aldactone 25/day  Diuretic - Lasix 20 BID - decrease 20/day   Vasodilator - no  Other - Digoxin  HFrEF (10-15%), NYHA II  NICM - ?viral induced    Stable, appears Euvolemic  Lab reviewed - stable  Repeat blood work in 10 days - BMP, BNP, Dig level  BP/HR stable   Continue diet/fluid adherence  Continue daily wts. Repeat ECHO in 90 days  Cardiac rehab ordered  Continue lifevest  F/U in 8 weeks  F/U Brandyn Mcdonlad next week. If pt shows little to no recovery in EF in 90 days then recommend referral to [facility] and CMR prior to any ICD implant. Discussed w/ pt - understands plan. Tolerating above noted HF meds, no ill side effects noted. Will continue to monitor kidney function and electrolytes. Will optimize as tolerated. Pt is compliant w/ medications. Total visit time of 40 minutes has been spent with patient on education of symptoms, management, medication, and plan of care; as well as review of chart: labs, ECHO, radiology reports, etc.   I personally spent more then 50% of the appt time face to face with the patient.   Daily weights  Fluid restriction of 2 Liters per day  Limit sodium in diet to around 4536-5391 mg/day  Monitor BP  Activity as tolerated     Patient was instructed to call the [address] for any changes in symptoms as noted in AVS.      No follow-ups on file. or sooner if needed     Patient given educational materials - see patient instructions. We discussed the importance of weighing oneself and recording daily. We also discussed the importance of a low sodium diet, higher sodium foods to avoid and better low sodium food options. Patient verbalizes understanding of plan of care using teach back method, and is agreeable to the treatment plan.        Electronically signed by DILIP Olivo CNP on 8/15/2022 at 12:21 PM

## 2022-08-15 NOTE — PATIENT INSTRUCTIONS
You may receive a survey regarding the care you received during your visit. Your input is valuable to us. We encourage you to complete and return your survey. We hope you will choose us in the future for your healthcare needs.     Continue:  Continue current medications  Daily weights and record  Fluid restriction of 2 Liters per day  Limit sodium in diet to around 2041-3354 mg/day  Monitor BP  Activity as tolerated     Call the Heart Failure Clinic for any of the following symptoms: [phone]  Weight gain of 2-3 pounds in 1 day or 5 pounds in 1 week  Increased shortness of breath  Shortness of breath while laying down  Cough  Chest pain  Swelling in feet, ankles or legs  Tenderness or bloating in the abdomen  Fatigue   Decreased appetite or nausea   Confusion

## 2022-08-16 ENCOUNTER — CARE COORDINATION (OUTPATIENT)
Dept: CASE MANAGEMENT | Age: 52
End: 2022-08-16

## 2022-08-16 NOTE — CARE COORDINATION
Care Transitions Outreach Attempt-1st attempt    Call within 2 business days of discharge: Yes   Attempted to reach patient for subsequent transitional call.  left to return call to [phone]. Patient: Keshawn Nunn Patient : 1970 MRN: [id number]    Last Discharge [facility]       Date Complaint Diagnosis Description Type Department Provider    8/3/22 Chest Pain; Shortness of Breath Pleural effusion . .. ED to Hosp-Admission (Discharged) (ADMITTED) GISELE Rodriguez PA-C; Nadiya...               Noted following upcoming appointments from discharge chart review:   [facility] follow up appointment(s):   Future Appointments   Date Time Provider Basil Gresham   2022  2:30 PM Kobe Espinosa MD N [facility] [address] [place] [address]   10/3/2022 11:30 AM DILIP Stewart CNP N [facility] CHF [address]   11/3/2022  8:00 AM STR ECHO RM1 STRZ ECHO Tesfaye HOD     Non-[facility] follow up appointment(s): na

## 2022-08-18 ENCOUNTER — CARE COORDINATION (OUTPATIENT)
Dept: CASE MANAGEMENT | Age: 52
End: 2022-08-18

## 2022-08-18 NOTE — CARE COORDINATION
Care Transitions Outreach Attempt-2nd attempt    Call within 2 business days of discharge: Yes   Attempted to reach patient for subsequent transitional call. VM left to return call to [phone]. If no return call, CTN will sign off-2nd attempt. Unable to reach letter sent via [address] Patient: Belgica Ham Patient : 1970 MRN: [id number]    Last Discharge [facility]       Date Complaint Diagnosis Description Type Department Provider    8/3/22 Chest Pain; Shortness of Breath Pleural effusion . .. ED to Hosp-Admission (Discharged) (ADMITTED) STRZ 8B Juliette Damian PA-C; Nadiya...               Noted following upcoming appointments from discharge chart review:   Cindy [address] Dr follow up appointment(s):   Future Appointments   Date Time Provider Basil Gresham   2022  2:30 PM Jessie Amos MD N SRPX Heart [facility] - [place] [address] [place]   10/3/2022 11:30 AM DILIP De La Rosa - CNP N SRPX CHF [facility] - [facility]   11/3/2022  8:00 AM STR ECHO RM1 STRZ ECHO Mera HALL     Non-[facility] follow up appointment(s): mike

## 2022-08-19 ENCOUNTER — TELEPHONE (OUTPATIENT)
Dept: CARDIOLOGY CLINIC | Age: 52
End: 2022-08-19

## 2022-08-19 DIAGNOSIS — R09.89 CARDIAC LEFT VENTRICULAR EJECTION FRACTION 10-20 PERCENT: Primary | ICD-10-CM

## 2022-08-19 DIAGNOSIS — I50.22 CHF (CONGESTIVE HEART FAILURE), NYHA CLASS II, CHRONIC, SYSTOLIC (HCC): ICD-10-CM

## 2022-08-19 NOTE — TELEPHONE ENCOUNTER
PROCEDURE: cardiac mri     DATE OF SERVICE: TBD    SERVICE LOCATION: [facility]    CPT CODE: 34738    PHYSICIAN: DR. IYER     DATE PRIOR AUTH SUBMITTED: 08/19/2022    STATUS: APPROVED.      AUTH NUMBER: [id number]    VALID:  0/19/2022-09/17/2022

## 2022-08-19 NOTE — TELEPHONE ENCOUNTER
Ordered in the hospital.  It was put not the correct order. Ordered the correct order and given to scheduling. They will forward to [facility].

## 2022-08-22 NOTE — TELEPHONE ENCOUNTER
Faxed new order to [facility].   Auth # is on the order and I also faxed H&P per [facility] request.  They will call patient to schedule

## 2022-08-23 ENCOUNTER — HOSPITAL ENCOUNTER (OUTPATIENT)
Age: 52
Discharge: HOME | End: 2022-08-23
Payer: COMMERCIAL

## 2022-08-23 DIAGNOSIS — I50.22 CHF (CONGESTIVE HEART FAILURE), NYHA CLASS II, CHRONIC, SYSTOLIC (HCC): ICD-10-CM

## 2022-08-23 PROCEDURE — 80048 BASIC METABOLIC PNL TOTAL CA: CPT

## 2022-08-23 PROCEDURE — 83880 ASSAY OF NATRIURETIC PEPTIDE: CPT

## 2022-08-23 PROCEDURE — 80162 ASSAY OF DIGOXIN TOTAL: CPT

## 2022-08-23 PROCEDURE — 36415 COLL VENOUS BLD VENIPUNCTURE: CPT

## 2022-08-24 ENCOUNTER — TELEPHONE (OUTPATIENT)
Dept: CARDIOLOGY CLINIC | Age: 52
End: 2022-08-24

## 2022-08-24 DIAGNOSIS — I50.22 CHF (CONGESTIVE HEART FAILURE), NYHA CLASS II, CHRONIC, SYSTOLIC (HCC): Primary | ICD-10-CM

## 2022-08-24 LAB
ANION GAP SERPL CALCULATED.3IONS-SCNC: 14 MEQ/L (ref 8–16)
BUN BLDV-MCNC: 19 MG/DL (ref 7–22)
CALCIUM SERPL-MCNC: 10.5 MG/DL (ref 8.5–10.5)
CHLORIDE BLD-SCNC: 99 MEQ/L (ref 98–111)
CO2: 27 MEQ/L (ref 23–33)
CREAT SERPL-MCNC: 1.3 MG/DL (ref 0.4–1.2)
DIGOXIN LEVEL: 1.6 NG/ML (ref 0.5–2)
GFR SERPL CREATININE-BSD FRML MDRD: 43 ML/MIN/1.73M2
GLUCOSE BLD-MCNC: 99 MG/DL (ref 70–108)
POTASSIUM SERPL-SCNC: 5.6 MEQ/L (ref 3.5–5.2)
PRO-BNP: 1883 PG/ML (ref 0–900)
SODIUM BLD-SCNC: 140 MEQ/L (ref 135–145)

## 2022-08-24 NOTE — TELEPHONE ENCOUNTER
Spoke to patient voiced understanding. Order will be at the desk for patient , has an appt with Dr. Bernard Baez tomorrow. Patient was questioning the BNP   Result ? Mariama advise?

## 2022-08-24 NOTE — TELEPHONE ENCOUNTER
----- Message from DILIP Cuenca CNP sent at 8/24/2022  7:52 AM EDT -----  Regarding: FW:   Potassium on high side. Stop Aldactone (don't throw out though) may try lower dose in future. Everything else looks good.   Repeat BMP in 1 week to ensure normalizing.    ----- Message -----  From: [facility] Incoming Lab Results From [facility]  Sent: 8/24/2022  12:08 AM EDT  To: DILIP Cuenca CNP

## 2022-08-25 ENCOUNTER — OFFICE VISIT (OUTPATIENT)
Dept: CARDIOLOGY CLINIC | Age: 52
End: 2022-08-25
Payer: COMMERCIAL

## 2022-08-25 VITALS
BODY MASS INDEX: 27.42 KG/M2 | HEIGHT: 62 IN | WEIGHT: 149 LBS | DIASTOLIC BLOOD PRESSURE: 68 MMHG | HEART RATE: 84 BPM | SYSTOLIC BLOOD PRESSURE: 118 MMHG

## 2022-08-25 DIAGNOSIS — I42.8 NONISCHEMIC CARDIOMYOPATHY (HCC): Primary | ICD-10-CM

## 2022-08-25 DIAGNOSIS — R53.83 FATIGUE, UNSPECIFIED TYPE: ICD-10-CM

## 2022-08-25 PROCEDURE — 99214 OFFICE O/P EST MOD 30 MIN: CPT | Performed by: INTERNAL MEDICINE

## 2022-08-25 NOTE — PROGRESS NOTES
[address] [facility] [address] [place] [address]  Dept: [phone]  Dept Fax: [phone]  Loc: [phone]    Visit Date: 8/25/2022    Ms. Timo Tran is a 47 y.o. female  who presented for:  Chief Complaint   Patient presents with    Follow-Up from Hospital       HPI:   HPI   47 y.o. female with a PMHx significant for depression, Hypothyroid and hypercholesterolemia who presents for follow-up. Cardiac cath negative. Pending MRI. She is on GDMT. Follow-up echo scheduled. She is tired. She feels better in terms of her breathing. She recently could not catch her breath and she felt bad this past Monday. Potassium reba with Aldactone, thus it has been held. Follows with CHF. She will have labs next week. No major swelling in the legs. Current Outpatient Medications:     empagliflozin (JARDIANCE) 10 MG tablet, Take 1 tablet by mouth in the morning., Disp: 30 tablet, Rfl: 5    furosemide (LASIX) 20 MG tablet, Take 1 tablet by mouth in the morning., Disp: 30 tablet, Rfl: 5    digoxin (LANOXIN) 125 MCG tablet, Take 1 tablet by mouth in the morning., Disp: 30 tablet, Rfl: 3    metoprolol succinate (TOPROL XL) 50 MG extended release tablet, Take 1 tablet by mouth in the morning., Disp: 30 tablet, Rfl: 3    sacubitril-valsartan (ENTRESTO) 24-26 MG per tablet, Take 1 tablet by mouth in the morning and 1 tablet before bedtime. , Disp: 60 tablet, Rfl: 3    levothyroxine (SYNTHROID) 75 MCG tablet, TAKE 1 TABLET DAILY, Disp: 90 tablet, Rfl: 3    venlafaxine (EFFEXOR XR) 150 MG extended release capsule, TAKE 1 CAPSULE ONCE DAILY (Patient taking differently: 2 times daily TAKE 1 CAPSULE in morning 1/2 capsule in the evening), Disp: 90 capsule, Rfl: 3    ibuprofen (ADVIL;MOTRIN) 200 MG tablet, Take 200 mg by mouth every 6 hours as needed for Pain, Disp: , Rfl:     acetaminophen (TYLENOL) 500 MG tablet, Take 500 mg by mouth every 6 hours as needed for Pain, Disp: , Rfl:     Past Medical History  Lisa  has a past medical history of Depression, Hypercholesterolemia, Hypothyroidism, and New onset of congestive heart failure ([address] Social History  Lisa  reports that she has been smoking cigarettes. She has a 2.00 pack-year smoking history. She has never used smokeless tobacco. She reports current alcohol use. She reports that she does not use drugs. Family History  Lisa family history includes Cancer in her father. There is no family history of bicuspid aortic valve, aneurysms, heart transplant, pacemakers, defibrillators, or sudden cardiac death. Past Surgical History   Past Surgical History:   Procedure Laterality Date    ABLATION OF DYSRHYTHMIC FOCUS      BREAST SURGERY  2007    implant bilateral breast    TONSILLECTOMY      TUBAL LIGATION         Review of Systems   Constitutional: Negative for chills and fever  HENT: Negative for congestion, sinus pressure, sneezing and sore throat. Eyes: Negative for pain, discharge, redness and itching. Respiratory: Negative for apnea, cough  Gastrointestinal: Negative for blood in stool, constipation, diarrhea   Endocrine: Negative for cold intolerance, heat intolerance, polydipsia. Genitourinary: Negative for dysuria, enuresis, flank pain and hematuria. Musculoskeletal: Negative for arthralgias, joint swelling and neck pain. Neurological: Negative for numbness and headaches. Psychiatric/Behavioral: Negative for agitation, confusion, decreased concentration and dysphoric mood. Objective:     /68   Pulse 84   Ht 5' 2\" (1.575 m)   Wt 149 lb (67.6 kg)   BMI 27.25 kg/m²     Wt Readings from Last 3 Encounters:   08/25/22 149 lb (67.6 kg)   08/15/22 152 lb 4 oz (69.1 kg)   08/11/22 153 lb 12.8 oz (69.8 kg)     BP Readings from Last 3 Encounters:   08/25/22 118/68   08/15/22 117/77   08/11/22 122/68       Nursing note and vitals reviewed.     Physical Exam   Constitutional: Oriented to person, place, and time. Appears well-developed and well-nourished. HENT:   Head: Normocephalic and atraumatic. Eyes: EOM are normal. Pupils are equal, round, and reactive to light. Neck: Normal range of motion. Neck supple. No JVD present. Cardiovascular: Normal rate, regular rhythm, normal heart sounds and intact distal pulses. No murmur heard. Pulmonary/Chest: Effort normal and breath sounds normal. No respiratory distress. No wheezes. No rales. Abdominal: Soft. Bowel sounds are normal. No distension. There is no tenderness. Musculoskeletal: Normal range of motion. No edema. Neurological: Alert and oriented to person, place, and time. No cranial nerve deficit. Coordination normal.   Skin: Skin is warm and dry. Psychiatric: Normal mood and affect.        No results found for: CKTOTAL, CKMB, CKMBINDEX    Lab Results   Component Value Date/Time    WBC 6.7 08/05/2022 07:06 AM    RBC 5.24 08/05/2022 07:06 AM    RBC 5.26 04/30/2018 09:52 AM    HGB 15.9 08/05/2022 07:06 AM    HCT 49.5 08/05/2022 07:06 AM    MCV 94.5 08/05/2022 07:06 AM    MCH 30.3 08/05/2022 07:06 AM    MCHC 32.1 08/05/2022 07:06 AM    RDW 12.5 04/30/2018 09:52 AM     08/05/2022 07:06 AM    MPV 10.9 08/05/2022 07:06 AM       Lab Results   Component Value Date/Time     08/23/2022 04:07 PM    K 5.6 08/23/2022 04:07 PM    K 3.8 08/03/2022 04:10 PM    CL 99 08/23/2022 04:07 PM    CO2 27 08/23/2022 04:07 PM    BUN 19 08/23/2022 04:07 PM    LABALBU 4.1 08/03/2022 04:10 PM    LABALBU 4.5 10/19/2011 08:05 AM    CREATININE 1.3 08/23/2022 04:07 PM    CALCIUM 10.5 08/23/2022 04:07 PM    LABGLOM 43 08/23/2022 04:07 PM    GLUCOSE 99 08/23/2022 04:07 PM    GLUCOSE 90 04/30/2018 09:52 AM       Lab Results   Component Value Date/Time    ALKPHOS 118 08/03/2022 04:10 PM    ALT 45 08/03/2022 04:10 PM    AST 32 08/03/2022 04:10 PM    PROT 6.5 08/03/2022 04:10 PM    BILITOT 1.1 08/03/2022 04:10 PM    BILIDIR 0.2 10/19/2011 08:05 AM    LABALBU 4.1 08/03/2022 04:10 PM    LABALBU 4.5 10/19/2011 08:05 AM       No results found for: MG    Lab Results   Component Value Date    INR 1.24 (H) 08/04/2022         No results found for: LABA1C    Lab Results   Component Value Date/Time    TRIG 116 06/13/2022 11:57 AM    HDL 62 06/13/2022 11:57 AM    LDLCALC 121 06/13/2022 11:57 AM    LABVLDL 18 04/30/2018 09:52 AM       Lab Results   Component Value Date/Time    TSH 2.100 06/13/2022 11:57 AM         Testing Reviewed:      I have individually reviewed the cardiac test below:    ECHO: Results for orders placed during the hospital encounter of 08/03/22    ECHO Complete 2D W Doppler W Color    Narrative  Transthoracic Echocardiography Report (TTE)    Demographics    Patient Name  Brent Moses Gender             Female    MR #          [id number]    Race                   Ethnicity    Account #     [de-identified]    Room Number        0025    Accession     [id number]   Date of Study      08/04/2022  Number    Date of Birth 1970   Referring          Aniyah Lopez MD  Physician          Clarke Burnham PA-C    Age           47 year(s)   Sonographer        JUANA Villa, SAMIR,  RDMS, RVT    Interpreting       Antonio Lo MD  Physician    Procedure    Type of Study    TTE procedure:ECHOCARDIOGRAM COMPLETE 2D W DOPPLER W COLOR. Procedure Date  Date: 08/04/2022 Start: 06:18 AM    Study Location: Bedside  Technical Quality: Adequate visualization    Indications:Dyspnea on exertion and Chest pain. Patient Status: Routine    Height: 62 inches Weight: 169 pounds BSA: 1.78 m^2 BMI: 30.91 kg/m^2    BP: 134/96 mmHg    Conclusions    Summary  Moderately dilated left ventricular size and severely reduce systolic  function. There was severe global hypokinesis. Wall thickness was within normal limits. Ejection fraction was estimated at 10-15%. The right ventricular size was normal with moderately reduced systolic  function and normal wall thickness.   There was trace mitral regurgitation. There was mild tricuspid regurgitation. Assuming RAP = 10 mmHg, the  estimated RVSP = 29 mmHg. IVC size is dilated with reduced respiratory phasic changes (CVP~5-10  mmHg)    Signature    ----------------------------------------------------------------  Electronically signed by Latoya Jerez MD (Interpreting  physician) on 08/04/2022 at 01:12 PM  ----------------------------------------------------------------    Findings    Mitral Valve  The mitral valve structure was normal with normal leaflet separation. DOPPLER: The transmitral velocity was within the normal range with no  evidence for mitral stenosis. There was trace mitral regurgitation. Aortic Valve  The aortic valve was trileaflet with normal thickness and cuspal  separation. DOPPLER: Transaortic velocity was within the normal range with  no evidence of aortic stenosis. There was no evidence of aortic  regurgitation. Tricuspid Valve  The tricuspid valve structure was normal with normal leaflet separation. DOPPLER: There was no evidence of tricuspid stenosis. There was mild  tricuspid regurgitation. Assuming RAP = 10 mmHg, the estimated RVSP = 29  mmHg. Pulmonic Valve  The pulmonic valve leaflets were not well seen. DOPPLER: The transpulmonic  velocity was within the normal range with no evidence for regurgitation. Left Atrium  Left atrial size was normal.    Left Ventricle  Moderately dilated left ventricular size and severely reduce systolic  function. There was severe global hypokinesis. Wall thickness was within normal limits. Ejection fraction was estimated at 10-15%. Right Atrium  Right atrial size was normal.    Right Ventricle  The right ventricular size was normal with moderately reduced systolic  function and normal wall thickness. Pericardial Effusion  The pericardium was normal in appearance with no evidence of a pericardial  effusion.     Pleural Effusion  No evidence of pleural effusion. Aorta / Great Vessels  IVC size is dilated with reduced respiratory phasic changes (CVP~5-10  mmHg)    M-Mode/2D Measurements & Calculations    LV Diastolic   LV Systolic Dimension:    AV Cusp Separation: 1.8 cmLA  Dimension: 5.6 5.2 cm                    Dimension: 3.7 cmAO Root  cm             LV Volume Diastolic: 766  Dimension: 2.8 cmLA Area: 17.5  LV FS:7.1 %    ml                        cm^2  LV PW          LV Volume Systolic: 267.2  Diastolic: 1.3 ml  cm             LV EDV/LV EDV Index: 131  Septum         ml/74 m^2LV ESV/LV ESV    RV Diastolic Dimension: 3.2 cm  Diastolic: 1.1 Index: 815.0 ml/66 m^2  cm             EF Calculated: 10.4 %     LA/Aorta: 1.32  Ascending Aorta: 2.9 cm  LV Length: 8.5 cm         LA volume/Index: 48.4 ml /27m^2    LV Area  Diastolic:  58.5 cm^2  LV Area  Systolic: 70.7  cm^2    Doppler Measurements & Calculations    MV Peak E-Wave: 78.4     AV Peak Velocity: 77.3 LVOT Peak Velocity: 49.3  cm/s                     cm/s                   cm/s  AV Peak Gradient: 2.39 LVOT Peak Gradient: 1 mmHg  MV Peak Gradient: 2.46   mmHg  mmHg                                            TV Peak E-Wave: 59.1 cm/s    MV Deceleration Time:                           TV Peak Gradient: 1.4 mmHg  134 msec                                        TR Velocity:217 cm/s  AV P1/2t: 1010 msec    TR Gradient:18.84 mmHg  IVRT: 148 msec         PV Peak Velocity: 47.1  cm/s  PV Peak Gradient: 0.89  AV DVI (Vmax):0.64     mmHg  MR Velocity: 328 cm/s    VA ED Velocity: 141 cm/s    [id number]       Assessment/Plan   NICM, NYHA II-III  EF 10-15%  No sig CAD  RVSP 29 mmHg  LBBB/IVCD  She is following with CHF, tried aldactone but potassium reba, CMR to be scheduled.   No volume on exam.  Likely viral.  Will await further imaging, if no improvement in 3 months, will need to consider ICD. LifeVest. Discussed diet/exercise/BP/weight loss/health lifestyle choices/lipids; the patient understands the goals and will try to comply.     Disposition:  3 months         Electronically signed by Trey Whitaker MD   8/25/2022 at 3:06 PM EDT

## 2022-08-26 ENCOUNTER — TELEPHONE (OUTPATIENT)
Dept: CARDIOLOGY CLINIC | Age: 52
End: 2022-08-26

## 2022-08-26 NOTE — TELEPHONE ENCOUNTER
BENJAMÍN For patient about her Cardiac MRI. Appt is 9/7/22 at 10:30 to arrive at 10:00 at [facility]. If that day doesn't work she can call [phone] and ask for scheduling and they can get her a new day and time.

## 2022-08-31 ENCOUNTER — HOSPITAL ENCOUNTER (OUTPATIENT)
Age: 52
Discharge: HOME | End: 2022-08-31
Payer: COMMERCIAL

## 2022-08-31 DIAGNOSIS — I50.22 CHF (CONGESTIVE HEART FAILURE), NYHA CLASS II, CHRONIC, SYSTOLIC (HCC): ICD-10-CM

## 2022-08-31 LAB
ANION GAP SERPL CALCULATED.3IONS-SCNC: 10 MEQ/L (ref 8–16)
BUN BLDV-MCNC: 17 MG/DL (ref 7–22)
CALCIUM SERPL-MCNC: 10 MG/DL (ref 8.5–10.5)
CHLORIDE BLD-SCNC: 97 MEQ/L (ref 98–111)
CO2: 30 MEQ/L (ref 23–33)
CREAT SERPL-MCNC: 1 MG/DL (ref 0.4–1.2)
GFR SERPL CREATININE-BSD FRML MDRD: 58 ML/MIN/1.73M2
GLUCOSE BLD-MCNC: 66 MG/DL (ref 70–108)
POTASSIUM SERPL-SCNC: 5.1 MEQ/L (ref 3.5–5.2)
SODIUM BLD-SCNC: 137 MEQ/L (ref 135–145)

## 2022-08-31 PROCEDURE — 36415 COLL VENOUS BLD VENIPUNCTURE: CPT

## 2022-08-31 PROCEDURE — 80048 BASIC METABOLIC PNL TOTAL CA: CPT

## 2022-09-01 NOTE — TELEPHONE ENCOUNTER
Repeat labs reviewed - K+ improved - down to 5.1 from 5.6. Continue off Aldactone  Continue to avoid high K+ foods.

## 2022-09-06 ENCOUNTER — PATIENT MESSAGE (OUTPATIENT)
Dept: FAMILY MEDICINE CLINIC | Age: 52
End: 2022-09-06

## 2022-09-06 DIAGNOSIS — F41.9 ANXIETY: Primary | ICD-10-CM

## 2022-09-06 RX ORDER — DIAZEPAM 5 MG/1
TABLET ORAL
Qty: 1 TABLET | Refills: 0 | Status: SHIPPED | OUTPATIENT
Start: 2022-09-06 | End: 2022-09-07

## 2022-09-06 NOTE — TELEPHONE ENCOUNTER
[facility] sent to patient letting her know that 1 valium was sent to [facility] in [place].

## 2022-09-06 NOTE — TELEPHONE ENCOUNTER
Ep'ed 1 valium to [facility] espinoza, Controlled Substance Monitoring:    Acute and Chronic Pain Monitoring:   RX Monitoring 9/6/2022   Periodic Controlled Substance Monitoring No signs of potential drug abuse or diversion identified.

## 2022-09-06 NOTE — TELEPHONE ENCOUNTER
From: Gustabo Ivan  To: Dr. Felton Star: 9/6/2022 3:06 PM EDT  Subject: Prescription     I am scheduled for a cardiac MRI tomorrow at [facility]. Can I get 1 tablet of something to help me relax for this this?    Thank you

## 2022-09-12 ENCOUNTER — TELEPHONE (OUTPATIENT)
Dept: CARDIOLOGY CLINIC | Age: 52
End: 2022-09-12

## 2022-09-13 DIAGNOSIS — I50.22 CHF (CONGESTIVE HEART FAILURE), NYHA CLASS II, CHRONIC, SYSTOLIC (HCC): ICD-10-CM

## 2022-09-13 DIAGNOSIS — R09.89 CARDIAC LEFT VENTRICULAR EJECTION FRACTION 10-20 PERCENT: ICD-10-CM

## 2022-09-16 NOTE — TELEPHONE ENCOUNTER
Dr. Nahun Dubose see cardiac MRI results under the media tab. Thanks!
Pt asking for results of Cardiac MRI done at [facility]. Called [address] [facility] records asking for results to be sent to us, which I had to [address].
Pt calling again for Cardiac MRI results. Please review.
Shows that heart function is low no clear etiology  Probably viral  Await 3 months of medical therapy and we will recheck echo
Spoke to and notified patient.
Offered and patient declined

## 2022-09-21 NOTE — TELEPHONE ENCOUNTER
ep'ed adderall to pharm requested      Controlled Substance Monitoring:    Acute and Chronic Pain Monitoring:   RX Monitoring 10/5/2020   Periodic Controlled Substance Monitoring No signs of potential drug abuse or diversion identified. Post-Care Instructions: I reviewed with the patient in detail post-care instructions. Patient is to keep the biopsy site dry overnight, and then apply bacitracin twice daily until healed. Patient may apply hydrogen peroxide soaks to remove any crusting.

## 2022-10-03 ENCOUNTER — OFFICE VISIT (OUTPATIENT)
Dept: CARDIOLOGY CLINIC | Age: 52
End: 2022-10-03
Payer: COMMERCIAL

## 2022-10-03 VITALS
SYSTOLIC BLOOD PRESSURE: 100 MMHG | HEART RATE: 89 BPM | HEIGHT: 62 IN | BODY MASS INDEX: 27.05 KG/M2 | WEIGHT: 147 LBS | DIASTOLIC BLOOD PRESSURE: 60 MMHG | OXYGEN SATURATION: 99 %

## 2022-10-03 DIAGNOSIS — I42.8 NONISCHEMIC CARDIOMYOPATHY (HCC): ICD-10-CM

## 2022-10-03 DIAGNOSIS — I50.22 CHF (CONGESTIVE HEART FAILURE), NYHA CLASS II, CHRONIC, SYSTOLIC (HCC): Primary | ICD-10-CM

## 2022-10-03 PROCEDURE — 99214 OFFICE O/P EST MOD 30 MIN: CPT | Performed by: NURSE PRACTITIONER

## 2022-10-03 RX ORDER — METOPROLOL SUCCINATE 50 MG/1
50 TABLET, EXTENDED RELEASE ORAL DAILY
Qty: 30 TABLET | Refills: 3
Start: 2022-10-03

## 2022-10-03 ASSESSMENT — ENCOUNTER SYMPTOMS
ABDOMINAL DISTENTION: 0
COUGH: 0
SHORTNESS OF BREATH: 0

## 2022-10-03 NOTE — PROGRESS NOTES
Heart Failure Clinic       Visit Date: 10/3/2022  Cardiologist:  Dr. Ashley Beck  Primary Care Physician: Dr. Chyna Pascal MD    Hayes Florez is a 47 y.o. female who presents today for:  Chief Complaint   Patient presents with    Congestive Heart Failure       HPI:   Hayes Florez is a 47 y.o. female who presents to the office for a follow up patient visit in the heart failure clinic. Accompanied by     TYPE HF: HFrEF (10-15%)  Cause: NICM - unknown etiology - likely viral - no ETOH or family hx  Device: lifevest  HX: HLD, Hypothyroidism, Depression, former smoker, Covid 12/2021     Dry Wt:  150     Hospitalization:  Aug 2022 - SOB, new severe CMP. Sinus tach - started Dig    Last OV 8/2022 - 152# (down 20#), no fluid, continued fatigue/LE. Had CMR last month - see below   Today: doing well - feeling back to baseline. Walking mile now. Not feel overly limited    Patient has:  Chest Pain: no  SOB: no   Orthopnea/PND: no  DELROY: no  Edema: no  Fatigue: improved  Abdominal bloating: no  Cough: no  Appetite: good  Home weight: stable  Home blood pressure: 97/58-100s - feel more blah when BP low, not often.     Past Medical History:   Diagnosis Date    Depression     Hypercholesterolemia     Hypothyroidism     New onset of congestive heart failure ([address] 8/4/2022     Past Surgical History:   Procedure Laterality Date    ABLATION OF DYSRHYTHMIC FOCUS      BREAST SURGERY  2007    implant bilateral breast    TONSILLECTOMY      TUBAL LIGATION       Family History   Problem Relation Age of Onset    Cancer Father         Lung Cancer     Social History     Tobacco Use    Smoking status: Some Days     Packs/day: 0.10     Years: 20.00     Pack years: 2.00     Types: Cigarettes    Smokeless tobacco: Never   Substance Use Topics    Alcohol use: Yes     Comment: occassionally      Current Outpatient Medications   Medication Sig Dispense Refill    metoprolol succinate (TOPROL XL) 50 MG extended release tablet Take 1 and Rhythm: Normal rate and regular rhythm. Heart sounds: Normal heart sounds. No murmur heard. Pulmonary:      Effort: Pulmonary effort is normal. No respiratory distress. Breath sounds: Normal breath sounds. No wheezing or rales. Abdominal:      General: Bowel sounds are normal. There is no distension. Palpations: Abdomen is soft. Tenderness: There is no abdominal tenderness. Musculoskeletal:         General: Normal range of motion. Cervical back: Normal range of motion and neck supple. Right lower leg: No edema. Left lower leg: No edema. Skin:     General: Skin is warm and dry. Capillary Refill: Capillary refill takes less than 2 seconds. Neurological:      Mental Status: She is alert and oriented to person, place, and time. Coordination: Coordination normal.   Psychiatric:         Behavior: Behavior normal.         Wt Readings from Last 3 Encounters:   10/03/22 147 lb (66.7 kg)   08/25/22 149 lb (67.6 kg)   08/15/22 152 lb 4 oz (69.1 kg)     BP Readings from Last 3 Encounters:   10/03/22 100/60   08/25/22 118/68   08/15/22 117/77     Pulse Readings from Last 3 Encounters:   10/03/22 89   08/25/22 84   08/15/22 76     Body mass index is 26.89 kg/m². ECHO:    Summary   Moderately dilated left ventricular size and severely reduce systolic   function. There was severe global hypokinesis. Wall thickness was within normal limits. Ejection fraction was estimated at 10-15%. The right ventricular size was normal with moderately reduced systolic   function and normal wall thickness. There was trace mitral regurgitation. There was mild tricuspid regurgitation. Assuming RAP = 10 mmHg, the   estimated RVSP = 29 mmHg.    IVC size is dilated with reduced respiratory phasic changes (CVP~5-10   mmHg)      Signature      ----------------------------------------------------------------   Electronically signed by Emelia Brunner MD (Interpreting   physician) on 08/04/2022 at 01:12 PM           CATH/STRESS:   RIGHT HEART PRESSURES:  1.  RA is 16.  2.  RV is 55/9, 22.  3.  PA is 53/32, 41.  4.  Wedge pressure is 29.  5.  LVEDP was measured to be 37 mmHg. 6.  AO sat was 87.  7.  PA is 57.  8.  Cardiac output was 2.88 and cardiac index was calculated at 1.6. The patient tolerated the procedure well. There were no immediate  complications. We gave IV Lasix on the table due to elevated LVEDP. She was transferred to her room in stable condition. SUMMARY:  1. Elevated right-sided filling pressures, elevated wedge pressure. 2.  Patent coronaries. RECOMMENDATIONS:  1. Continue IV diuresis. 2.  Monitor hemodynamics closely. 3.  Optimize heart failure therapies. 4. Lifevest on discharge  5.  CHF clinic referral           Silvano Shaffer MD     D: 08/04/2022 16:57:09      Cardiac MRI        Results reviewed:  BNP: No results found for: BNP  CBC:   Lab Results   Component Value Date/Time    WBC 6.7 08/05/2022 07:06 AM    RBC 5.24 08/05/2022 07:06 AM    RBC 5.26 04/30/2018 09:52 AM    HGB 15.9 08/05/2022 07:06 AM    HCT 49.5 08/05/2022 07:06 AM     08/05/2022 07:06 AM     CMP:    Lab Results   Component Value Date/Time     08/31/2022 03:56 PM    K 5.1 08/31/2022 03:56 PM    K 3.8 08/03/2022 04:10 PM    CL 97 08/31/2022 03:56 PM    CO2 30 08/31/2022 03:56 PM    BUN 17 08/31/2022 03:56 PM    CREATININE 1.0 08/31/2022 03:56 PM    LABGLOM 58 08/31/2022 03:56 PM    GLUCOSE 66 08/31/2022 03:56 PM    GLUCOSE 90 04/30/2018 09:52 AM    CALCIUM 10.0 08/31/2022 03:56 PM     Hepatic Function Panel:    Lab Results   Component Value Date/Time    ALKPHOS 118 08/03/2022 04:10 PM    ALT 45 08/03/2022 04:10 PM    AST 32 08/03/2022 04:10 PM    PROT 6.5 08/03/2022 04:10 PM    BILITOT 1.1 08/03/2022 04:10 PM    BILIDIR 0.2 10/19/2011 08:05 AM    LABALBU 4.1 08/03/2022 04:10 PM    LABALBU 4.5 10/19/2011 08:05 AM     Magnesium:  No results found for: MG  PT/INR:    Lab Results   Component Value Date/Time    INR 1.24 08/04/2022 11:09 AM     Lipids:    Lab Results   Component Value Date/Time    TRIG 116 06/13/2022 11:57 AM    HDL 62 06/13/2022 11:57 AM    LDLCALC 121 06/13/2022 11:57 AM    LABVLDL 18 04/30/2018 09:52 AM       ASSESSMENT AND PLAN:      Diagnosis Orders   1. CHF (congestive heart failure), NYHA class II, chronic, systolic (HCC)  Basic Metabolic Panel    Brain Natriuretic Peptide      2. Nonischemic cardiomyopathy (HCC)          Continue:  GDMT:              ACE/ARB/ARNI - Entresto 24/26 BID              BB - Toprol 50/day - TAKE Half tab if BP <110              SGLT2 -  Jardiance 10/day  AA - Aldactone 25/day - STOPPED 8/24 d/t elevated K+ 5.6  Diuretic - Lasix 20/day  Vasodilator - no  Other - Digoxin  HFrEF (10-15%), NYHA II  NICM - ?viral induced    Stable, appears Euvolemic  Lab reviewed - stable  Repeat blood work in next day/so  BP/HR stable   No med changes today - on optimally tolerated GDMT. BP runs low. Continue diet/fluid adherence  Continue daily wts. 90 day repeat ECHO on 11/3  F/U w/ Cardiology  F/U in clinic in 4 months    Tolerating above noted HF meds, no ill side effects noted. Will continue to monitor kidney function and electrolytes. Will optimize as tolerated. Pt is compliant w/ medications. Total visit time of 30 minutes has been spent with patient on education of symptoms, management, medication, and plan of care; as well as review of chart: labs, ECHO, radiology reports, etc.   I personally spent more then 50% of the appt time face to face with the patient. Daily weights  Fluid restriction of 2 Liters per day  Limit sodium in diet to around 9189-4399 mg/day  Monitor BP  Activity as tolerated     Patient was instructed to call the [facility] for any changes in symptoms as noted in AVS.      Return in about 4 months (around 2/3/2023). or sooner if needed     Patient given educational materials - see patient instructions.    We discussed the importance of weighing oneself and recording daily. We also discussed the importance of a low sodium diet, higher sodium foods to avoid and better low sodium food options. Patient verbalizes understanding of plan of care using teach back method, and is agreeable to the treatment plan.        Electronically signed by DILIP Montez CNP on 10/3/2022 at 12:45 PM

## 2022-10-03 NOTE — PATIENT INSTRUCTIONS
You may receive a survey regarding the care you received during your visit. Your input is valuable to us. We encourage you to complete and return your survey. We hope you will choose us in the future for your healthcare needs.     Continue:  Continue current medications  Daily weights and record  Fluid restriction of 2 Liters per day  Limit sodium in diet to around 1760-3645 mg/day  Monitor BP  Activity as tolerated     Call the Heart Failure Clinic for any of the following symptoms: [phone]  Weight gain of 2-3 pounds in 1 day or 5 pounds in 1 week  Increased shortness of breath  Shortness of breath while laying down  Cough  Chest pain  Swelling in feet, ankles or legs  Tenderness or bloating in the abdomen  Fatigue   Decreased appetite or nausea   Confusion

## 2022-10-05 ENCOUNTER — TELEPHONE (OUTPATIENT)
Dept: CARDIOLOGY CLINIC | Age: 52
End: 2022-10-05

## 2022-10-05 ENCOUNTER — HOSPITAL ENCOUNTER (OUTPATIENT)
Age: 52
Discharge: HOME | End: 2022-10-05
Payer: COMMERCIAL

## 2022-10-05 DIAGNOSIS — I50.22 CHF (CONGESTIVE HEART FAILURE), NYHA CLASS II, CHRONIC, SYSTOLIC (HCC): ICD-10-CM

## 2022-10-05 PROCEDURE — 83880 ASSAY OF NATRIURETIC PEPTIDE: CPT

## 2022-10-05 PROCEDURE — 36415 COLL VENOUS BLD VENIPUNCTURE: CPT

## 2022-10-05 PROCEDURE — 80048 BASIC METABOLIC PNL TOTAL CA: CPT

## 2022-10-05 NOTE — TELEPHONE ENCOUNTER
I called and talked to patient. She doesn't think she needs a letter and states she will call us back if she ends up needing a letter.

## 2022-10-05 NOTE — TELEPHONE ENCOUNTER
Patient called and is wanting to go back to work part time. 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 She is currently wearing a life vest, and want's to know if Dr. Ninfa Calles would be able to clear her to work with or without restrictions.

## 2022-10-06 ENCOUNTER — TELEPHONE (OUTPATIENT)
Dept: CARDIOLOGY CLINIC | Age: 52
End: 2022-10-06

## 2022-10-06 LAB
ANION GAP SERPL CALCULATED.3IONS-SCNC: 9 MEQ/L (ref 8–16)
BUN BLDV-MCNC: 21 MG/DL (ref 7–22)
CALCIUM SERPL-MCNC: 10.3 MG/DL (ref 8.5–10.5)
CHLORIDE BLD-SCNC: 100 MEQ/L (ref 98–111)
CO2: 28 MEQ/L (ref 23–33)
CREAT SERPL-MCNC: 1.2 MG/DL (ref 0.4–1.2)
GFR SERPL CREATININE-BSD FRML MDRD: 47 ML/MIN/1.73M2
GLUCOSE BLD-MCNC: 91 MG/DL (ref 70–108)
POTASSIUM SERPL-SCNC: 5.4 MEQ/L (ref 3.5–5.2)
PRO-BNP: 1116 PG/ML (ref 0–900)
SODIUM BLD-SCNC: 137 MEQ/L (ref 135–145)

## 2022-10-06 NOTE — TELEPHONE ENCOUNTER
----- Message from DILIP Mac CNP sent at 10/6/2022  8:04 AM EDT -----  Labs look good except Potassium little above normal.  Believe she was eating more bananas and such. Already off Aldactone. Do not want to decrease or stop Entresto so have her try avoid K+ foods a little better.    Repeat BMP 2 months

## 2022-10-10 NOTE — PLAN OF CARE
.   Hospital Facility-Based Program  [facility]/Traditional Cardiac Rehab  PHYSICIAN ORDER  Class I Level B based on research  Medical Director:  Dr. Endy Byrnes MD     Patient Name: Dunia Last : 1970  Referring Physician: Magaly Whitley , CNP  Date: 10/10/2022  Allergies: Allergies as of 10/18/2022    (No Known Allergies)        Diagnosis:  CHF, NYHA class II, EF= 10-15% on 8/15/22    [x] [facility] with telemetry monitoring, resting and exercise        BPs & HRs with each session. Hospital setting for patient safety. [x] 72 sessions: 36 exercise sessions, 36 education sessions   [] 36 sessions: 18 exercise sessions, 18 education sessions  [] Traditional Cardiac Rehab with telemetry monitoring, resting and exercise BPs &       HRs with each session. Hospital setting for patient safety. [] 36 sessions:  32 exercise sessions, 4 education sessions     Per Patient symptoms, proceed with:   [x]Nitroglycerine 0.4mg SL every 5 minutes prn, maximum of 3, for chest pain   [x]12-lead EKG for symptoms of chest pain or noted change in heart rhythm   [x]Administer O2 per nasal cannula for symptoms of chest pain or acute dyspnea    Physician Prescribed Exercise:  Plan of Care:  Patient to attend exercise sessions with aerobic endurance and strength training for a total of 31-60 min/day, 3 days/week with supplemented 30+ minutes of aerobic exercise at home on days not participating in Cardiac Rehab. Aerobic Endurance Training  Aerobic Endurance mode (TM, AD, NS) starting at 5-8 minutes progressing by 2-3 minutes each week to a total of 15-30 minutes 2-3x/week. Arms only 5 min  Stair step increasing to 2 min  Resistance/strength training:  Hand weights starting at 1-5 lbs increasing in weight by 1-2 lbs and/or per patient tolerance weekly.   Start with 8 repetitions and increase the repetitions each exercise session per patient tolerance for a total of 15 repetitions. Measurable Endurance Goal:  Aerobic endurance goal to be measured in minutes. Start endurance training per patient tolerance at 1-8 minutes per exercise type, progressing to a total of 31-60 minutes using various modes of training (see Exercise Prescription). Progression:    [x] Weekly 5-10% intensity progression, as tolerated, during cardiac rehab sessions. [x] 13-17 Rate of Perceived Exertion on the Negin Scale (6-20). Measurable Muscular Strength Goal:  Starting at 1-5 lbs x 8 reps, progressing to 5-25 lbs x 15 reps per patient tolerance.       Electronically signed by Radha Burk PT on 10/10/2022 at 2:20 PM    Exercise Physiologist/Date/Time

## 2022-10-18 ENCOUNTER — HOSPITAL ENCOUNTER (OUTPATIENT)
Dept: CARDIAC REHAB | Age: 52
Setting detail: THERAPIES SERIES
Discharge: HOME | End: 2022-10-18
Payer: COMMERCIAL

## 2022-10-18 PROCEDURE — G0422 INTENS CARDIAC REHAB W/EXERC: HCPCS

## 2022-10-18 PROCEDURE — G0423 INTENS CARDIAC REHAB NO EXER: HCPCS

## 2022-10-18 NOTE — PROGRESS NOTES
Video Education Report - ICR/CR  Name:  Yulissa Giraldo     Date:  10/18/2022  MRN: [id number]     Session #:  1  Session Length: 40 min    Core Videos        []Heart Disease Risk Reduction       [x]Overview of [facility] Eating Plan      []Move it          []Calorie Density         []Healthy Minds, Bodies, Hearts        []Label Reading - Part 1       []Metabolic Syndrome and Belly Fat        []How Our Thoughts Can Heal Our Hearts   []Dining Out - Part 1      []Biomechanical Limitations  []Facts on Fat        []Hypertension & Heart Disease    []Diseases of Our Time - Focusing on Diabetes   []Body Composition  []Nutrition Action Plan    []Exercise Action Plan      Comments:  Video completed, group discussion

## 2022-10-18 NOTE — PLAN OF CARE
[address] [place] [address] Facility-Based Program  Individualized Cardiac Treatment Plan    Patient Name:  Clarisa Winters  :  1970  Age:  47 y.o. MRN:  [id number]  Diagnosis: CHF  Date of Event: --   Physician: Renetta Jones  Next Office Visit:  22  Date Entered Program: 10/18/22  Risk Stratifications: [] Low [] Intermediate [x] High  Allergies: No Known Allergies    COVID -19 Screen  Do you have any of the following symptoms:  [] Fever [] Cough [] SOB [] Muscle/Body Ache [] Loss of taste/smell [x] None    Have you traveled outside of the [place]? [] Yes      [x] No    Have you been around anyone who has tested Positive for COVID 19?  [] Yes      [x] No    The following Education has been completed with patient. [x] Wait in the lobby until we call you back to rehab. [x] You must wear a mask while in the medical center, and in cardiac rehab. Please bring your own. [x] Bring your own bottle of water with you.       Individual Cardiac Treatment Plan -EXERCISE  INITIAL 30 DAY 60 DAY 90  DAY FINAL DAY   EXERCISE  ASSESSMENT/PLAN EXERCISE  REASSESSMENT EXERCISE   REASSESSMENT EXERCISE   REASSESSMENT EXERCISE   REASSESSMENT EXERCISE  DISCHARGE/FOLLOW-UP   Date: 10/18/22 Date: Date: Date: Date: Date:   Session #1  First Exercise Session:  10/24/22 Session # ** Session # ** Session # ** Session # ** Session # **  Last Exercise Session:  **   Stages of Change Stages of Change Stages of Change Stages of Change Stages of Change Stages of Change   [] Pre Contemplation  [x] Contemplation  [] Preparation  [] Action  [] Maintenance  [] Relapse [] Pre Contemplation  [] Contemplation  [] Preparation  [] Action  [] Maintenance  [] Relapse [] Pre Contemplation  [] Contemplation  [] Preparation  [] Action  [] Maintenance  [] Relapse [] Pre Contemplation  [] Contemplation  [] Preparation  [] Action  [] Maintenance  [] Relapse [] Pre Contemplation  [] Contemplation  [] Preparation  [] Action  [] Maintenance  [] Relapse [] Pre Contemplation  [] Contemplation  [] Preparation  [] Action  [] Maintenance  [] Relapse   EXERCISE ASSESSMENT EXERCISE ASSESSMENT EXERCISE ASSESSMENT EXERCISE ASSESSMENT EXERCISE ASSESSMENT EXERCISE ASSESSMENT   6 Min Walk Test  Distance walked:   0.22 miles  1161 ft.  2.6 METs  Max HR:121 BPM      RPE:  13    THR:  129-142  Rhythm:  Sinus tach      6 Min Walk Test  Distance walked:   ** miles  ** ft  ** METs  Max HR:** BPM      RPE:  **  %Change ft= **    Rhythm:  **   DASI: 5.7 METs DASI: ** METs DASI: ** METs DASI: ** METs DASI: ** METs DASI: ** METs   Return to Work  Lisa plans on returning to work? [x] Yes  - pt might return to work if her echo shows improvements          [] No   [] Disabled     [] Retired     Return to work: Has Lisa returned to work? [] Yes    [] No    Return to work date set? [] Yes, **    [] No    Lisa is doing ** at work. Return to work: Has Lisa returned to work? [] Yes    [] No    Return to work date set? [] Yes, **    [] No    Lisa is doing ** at work. Return to work: Has Lisa returned to work? [] Yes    [] No    Return to work date set? [] Yes, **    [] No    Lisa is doing ** at work. Return to work: Has Lisa returned to work? [] Yes    [] No    Return to work date set? [] Yes, **    [] No    Lisa is doing ** at work. Return to work: Has Lisa returned to work? [] Yes    [] No    Return to work? [] Yes, **    [] No    *Required MET Level achieved for job duties?    [] Yes    [] No   Orthopedic Limitations/  [] Yes    [x] No       Orthopedic Limitations  *If patient has orthopedic issue:   Actions/  accomodations needed to make Lisa successful : Orthopedic Limitations   Orthopedic Limitations   Orthopedic Limitations Orthopedic Limitations     Fall Risk    []Assessed as a fall risk  [x] Not a fall risk      [] Balance Issues       [] Jina Marcum        [] [address] [facility]       [] Wheelchair  Actions needed:  NA    [x] Safety issues reviewed      Fall Risk  *If patient is a fall risk, action needed to accommodate:  Fall Risk Fall Risk Fall Risk Fall Risk   Home Exercise  [x] Yes    [] No  Type: walking and strengthening   Frequency: 3-4d/wk  Duration: 20-25min Home Exercise  [] Yes    [] No  Type: **  Frequency:**  Duration: ** Home Exercise  [] Yes    [] No  Type: **  Frequency: **  Duration: ** Home Exercise  [] Yes    [] No  Type: **  Frequency: **  Duration: ** Home Exercise  [] Yes    [] No  Type: **  Frequency: **  Duration: ** Home Exercise  [] Yes    [] No  Type: **  Frequency: **  Duration: **   Angina with Activity? [] Yes    [x] No   Angina with Activity? [] Yes    [] No  Angina Management: ** Angina with Activity? [] Yes    [] No  Angina Management: ** Angina with Activity? [] Yes    [] No  Angina Management: ** Angina with Activity? [] Yes    [] No  Angina Management: ** Angina with Activity?   [] Yes    [] No  Angina Management: **   EXERCISE PLAN EXERCISE PLAN EXERCISE PLAN EXERCISE PLAN EXERCISE PLAN EXERCISE PLAN   *Interventions* *Interventions* *Interventions* *Interventions* *Interventions* *Interventions*   Exercise Prescription  (per physician & CR staff) Exercise Prescription  (per physician & CR staff) Exercise Prescription  (per physician & CR staff) Exercise Prescription  (per physician & CR staff) Exercise Prescription  (per physician & CR staff) Exercise Prescription  (per physician & CR staff)   Cardiovascular Cardiovascular Cardiovascular Cardiovascular Cardiovascular Cardiovascular   Mode:    [x] Treadmill (TM)  [x] Schwinn Airdyne (AD)  [x] Arms Ergometer (AE)  [x] NuStep   MODE:    [] Treadmill (TM)  [] Schwinn Airdyne (AD)  [] Arms Ergometer (AE)  [] NuStep  [] Elliptical (E) MODE:    [] Treadmill (TM)  [] Schwinn Airdyne (AD)  [] Arms Ergometer (AE)  [] NuStep  [] Elliptical (E) MODE:    [] Treadmill (TM)  [] Schwinn Airdyne (AD)  [] Arms Ergometer (AE)  [] NuStep  [] Elliptical (E) MODE:    [] Treadmill (TM)  [] Schwinn Airdyne (AD)  [] Arms Ergometer (AE)  [] NuStep  [] Elliptical (E) MODE:    [] Treadmill (TM)  [] Schwinn Airdyne (AD)  [] Arms Ergometer (AE)  [] NuStep  [] Elliptical (E)   Initial Workloads  TM: [email or web address] 2.8 METs  AD: 0.7 level = 2.6 METs  NS: 50  [place]= 2.6 METs  AE: 0.7 level = 2.6 METs Current Workloads  TM:  @ %=  METs  AD:  level =  METs  NS:   [place]=  METs  AE:  level =  METs Current Workloads  TM:  @ %=  METs  AD:  level =  METs  NS:   [place]=  METs  AE:  level =  METs Current Workloads  TM:  @ %=  METs  AD:  level =  METs  NS:   [place]=  METs  AE:  level =  METs Current Workloads  TM:  @ %=  METs  AD:  level =  METs  NS:   [place]ale[place]=  METs  AE:  level =  METs Current Workloads  TM:  @ %=  METs  AD:  level =  METs  NS:   [place]=  METs  AE:  level =  METs     Frequency:    ICR: 3x/week  Home: 2-3x/wk Frequency:   ICR: 3x/week  Home: 3x/wk Frequency:  ICR: 3x/week  Home: 3-4x/wk Frequency:  ICR: 3x/week  Home: 3-4x/wk Frequency:  ICR: 3x/week  Home: 3-4x/wk Frequency:  Lisa will continue exercise at  5-7 days/week   Duration:   Total aerobic exercise = 30-45 min    5-8 min/mode Duration:  Total aerobic exercises = ** min     **min/mode Duration:  Total aerobic exercises = ** min     **min/mode Duration:  Total aerobic exercises = ** min     **min/mode Duration:  Total aerobic exercises = ** min     **min/mode Duration:  Total erobic exercise =  60-90 min   Intensity:   MET Level =2.8  RPE = 12-15 Intensity:  Max MET Level = **  RPE = 12-15 Intensity:  Max MET Level = **  RPE = 12-15 Intensity:  Max MET Level = **  RPE = 12-15 Intensity:  Max MET Level = **  RPE = 12-15 Intensity:  Max MET Level = ** RPE = 12-15   Progression: increase aerobic activity up to 15 min over next 4 weeks by increasing time 2-3 min/week.  Progression:   Progression:   Progression: Progression: Progression:  Increase time/intensity when RPE <13, and HR is in [facility] Strength Training Strength Training   [x] Yes      [] No  Upper and Lower body strength training 2x/wk    Wt: 5#       Reps:  8-15    *Increase wt. after completing 15 reps with an RPE of <12/13. [] Yes      [] No  Upper and Lower body strength training 2x/wk    Wt: **#       Reps:  8-15    *Increase wt. after completing 15 reps with an RPE of <12/13. [] Yes      [] No  Upper and Lower body strength training 2x/wk    Wt: **#       Reps:  8-15    *Increase wt. after completing 15 reps with an RPE of <12/13. [] Yes      [] No  Upper and Lower body strength training 2x/wk    Wt: **#       Reps:  8-15    *Increase wt. after completing 15 reps with an RPE of <12/13. [] Yes      [] No  Upper and Lower body strength training 2x/wk    Wt: **#       Reps:  8-15    *Increase wt. after completing 15 reps with an RPE of <12/13. Continue Strength Training at home   [] Exercise Log & Strength training handout given     Wt: **#       Reps:  8-15    *Increase wt. after completing 15 reps with an RPE of <12/13. Flexibility Flexibility Flexibility Flexibility Flexibility Flexibility   [x] Yes      [] No  25 min session of Core Strength & Flexibility 1x/per week  Attends Core Strength & Flexibility   [] Yes      [] No Attends Core Strength & Flexibility   [] Yes      [] No Attends Core Strength & Flexibility   [] Yes      [] No Attends Core Strength & Flexibility   [] Yes      [] No Continue Core Strength & Flexibility at home   Home Exercise  *Lisa verbalizes planning to walk 2 days/week for 30 min on days not in rehab. Home Exercise  *Lisa verbalizes planning to ** ** days/week for ** min on days not in rehab. Home Exercise  *Lisa verbalizes planning to ** ** days/week for ** min on days not in rehab. Home Exercise  *Lisa verbalizes planning to ** ** days/week for ** min on days not in rehab. Home Exercise  *Lisa verbalizes planning to ** ** days/week for ** min on days not in rehab.  Home Exercise  *Lisa verbalizes his/her plan to ** ** days/week for ** min @ **   *Education* *Education* *Education* *Education* *Education* *Education*   RPE Scale  [x] Yes      [] No  Exercise Safety  [x] Yes      [] No  Equipment Orientation  [x] Yes      [] No  S/S to Report  [x] Yes      [] No  Warm Up/Cool Down  [x] Yes      [] No  Home Exercise  [x] Yes      [] No     All Exercise Education Completed  [] Yes      [] No   Exercise Education Recommended    Workshops  [x] Benefits of Exercise  [x] Balance Training & Fall Prevention  [x] Heart Disease Risk Reduction  [x] Yoga & Heart Health Exercise Education Attended/Date Exercise Education Attended/Date Exercise Education Attended/Date Exercise Education Attended/Date All Sessions Completed? [] Yes  [] No   *Goals* *Goals* *Goals* *Goals* *Goals* *Goals*   Initial Exercise Goals Exercise Goals  Exercise Goals   Exercise Goals  Exercise Goals  Exercise Goals   Lisa plans to:  [x] Attend exercise sessions 3x/wk  [x] initiate home exercise 2-3x/wk for 10-20 min  [x] Increase 6 min walk distance by 10%  [x] Attend Exercise workshops Lisa plans to:  [x] Attend exercise sessions 3x/wk  [x] continue home exercise 2-3x/wk for 20-30 min  [x] Attend Exercise workshops Lisa's plans to:  [x] Attend exercise sessions 3x/wk  [x] continue home exercise 3-4x/wk for 30-45 min  [x] Determine plan of exercise following rehab  [x] Attend Exercise workshops Lisa's plans to:  [x] Attend exercise sessions 3x/wk  [x] continue home exercise 3-4x/wk for 30-45 min  [x] Determine plan of exercise following rehab  [x] Attend Exercise workshops Lisa's plans to:  [x] Attend exercise sessions 3x/wk  [x] continue home exercise 3-4x/wk for 30-45 min  [x] Determine plan of exercise following rehab  [x] Attend Exercise workshops Lisa achieved exercise goals?     []  Yes    [] No  If no, why?  **  [] Increased 6 min walk distance by 10%  [] Currently exercising 30-60 min/day, 5-7days/wk   [] Plans to continue exercise on own  [] Plans to join a local fitness center to continue exercise  [] Does not plan to continue to exercise after rehab   Mutually agreed upon goals:  Lisa would like to build strength and endurance, and be able to walk a 5k again.   Progress towards mutually agreed upon goals:  Lisa  ** Progress towards mutually agreed upon goals:  Lisa  ** Progress towards mutually agreed upon goals:  Lisa ** Progress towards mutually agreed upon goals:  Lisa  ** Progress towards mutually agreed upon goals:  Lisa  **    *MET level required for above goal:  4.6 METs MET level Achieved:  **METs MET level Achieved:  **METs MET level Achieved:  **METs MET level Achieved:  **METs MET level Achieved:  **METs     Individual Cardiac Treatment Plan - Nutrition  NUTRITION  ASSESSMENT/PLAN NUTRITION  REASSESSMENT NUTRITION   REASSESSMENT NUTRITION   REASSESSMENT NUTRITION  DISCHARGE/FOLLOW-UP NUTRITION  DISCHARGE/FOLLOW-UP   Stages of Change Stages of Change Stages of Change Stages of Change Stages of Change Stages of Change   [] Pre Contemplation  [x] Contemplation  [] Preparation  [] Action  [] Maintenance  [] Relapse [] Pre Contemplation  [] Contemplation  [] Preparation  [] Action  [] Maintenance  [] Relapse [] Pre Contemplation  [] Contemplation  [] Preparation  [] Action  [] Maintenance  [] Relapse [] Pre Contemplation  [] Contemplation  [] Preparation  [] Action  [] Maintenance  [] Relapse [] Pre Contemplation  [] Contemplation  [] Preparation  [] Action  [] Maintenance  [] Relapse [] Pre Contemplation  [] Contemplation  [] Preparation  [] Action  [] Maintenance  [] Relapse   NUTRITION ASSESSMENT NUTRITION ASSESSMENT NUTRITION ASSESSMENT NUTRITION ASSESSMENT NUTRITION ASSESSMENT NUTRITION ASSESSMENT   Weight Management  Weight: 144        Height: 5'2\"   BMI: 26.3  Weight Management  Weight: **                  Weight Management  Weight: **                  Weight Management  Weight: ** Weight Management  Weight: ** Weight Management  Weight: **                    BMI: ** Eating Plan  Current eating habits: \"does not have much of an appetite, watching sodium and fluid\" Eating Plan  Changes: Eating Plan  Changes: Eating Plan  Changes: Eating Plan  Changes: Eating Plan Improvements:   Alcohol Use  [x] none          [] daily  [] weekly      [] special         Diet Assessment Tool:  RATE YOUR PLATE  *Given to patient to complete and return. Diet Assessment Tool:    Score: **/72        Diet Assessment Tool: RATE YOUR PLATE  Score: **/91   NUTRITION PLAN NUTRITION PLAN NUTRITION PLAN NUTRITION PLAN NUTRITION PLAN NUTRITION PLAN   *Interventions* *Interventions* *Interventions* *Interventions* *Interventions* *Interventions*   Initial Survey given Goal Setting Discussion:   [x] Yes      [] No        Follow Up Survey Reviewed & Goals Updated:     Professional Referral  Please check if needed. [] Dietitian Consult   [] Wt. Management Referral  [] Other:  Professional Referral  Please check if needed. [] Dietitian Consult   [] Wt. Management Referral  [] Other: Professional Referral  Please check if needed. [] Dietitian Consult   [] Wt. Management Referral  [] Other: Professional Referral  Please check if needed. [] Dietitian Consult   [] Wt. Management Referral  [] Other: Professional Referral  Please check if needed. [] Dietitian Consult   [] Wt. Management Referral  [] Other: Professional Referral  Please check if needed. [] Dietitian Consult   [] Wt. Management Referral  [] Other:   *Education* *Education* *Education* *Education* *Education* *Education*   Nutritional Education Recommended    [x] 1:1 Registered Dietitian    Workshops  [x] Label Reading   [x] Menu  [x] Targeting Nutrition Priorities  [x] Mindful Eating   Nutritional Education Attended/Date Nutritional Education Attended/Date Nutritional Education Attended/Date Nutritional Education Attended/Date All Sessions Completed?     [] Yes  [] No   Cooking School  Recommended     [x] Adding Flavor  [x] Fast & Healthy Breakfasts  [x] Salads & Dressings  [x] Savory Soups  [x] Simple Sides & Sauces  [x] Appetizers &     Snacks  [x] Delicious Desserts  [x] Plant-Based Proteins  [x] Fast Evening Meals  [x] Weekend Breakfasts  [x] Efficiency Cooking  [x] One-Pot [employer]  Sessions Completed   [facility]  Sessions Completed [facility] [employer]  Sessions Completed     [facility] [employer]  Sessions Completed [facility]    # of sessions completed:  **   *Goals* *Goals* *Goals* *Goals* *Goals* *Goals*   Lisa's nutritional goals are as follows:  Complete and return diet survey Lisa's nutritional goals are as follows:  [] Attend Nutrition Workshops  [] Attend 1:1   [] Attend Cooking Classes  [] ** Lisa's nutritional goals are as follows:  [] Attend Nutrition Workshops  [] Attend 1:1   [] Attend Cooking Classes  [] Complete and return diet survey  [] ** Lisa's nutritional goals are as follows:  [] Attend Nutrition Workshops  [] Attend 1:1   [] Attend Cooking Classes  [] ** Lisa's nutritional goals are as follows:  [] Attend Nutrition Workshops  [] Attend 1:1   [] Attend Cooking Classes  [] ** Lisa achieved nutritional goals   [] Yes    [] No  If no, why?   Use knowledge gained to continue Pritikin eating plan at home       Individual Cardiac Treatment Plan - Psychosocial  PSYCHOSOCIAL  ASSESSMENT/PLAN PSYCHOSOCIAL  REASSESSMENT PSYCHOSOCIAL   REASSESSMENT PSYCHOSOCIAL   REASSESSMENT PSYCHOSOCIAL  DISCHARGE/FOLLOW-UP PSYCHOSOCIAL  DISCHARGE/FOLLOW-UP   Stages of Change Stages of Change Stages of Change Stages of Change Stages of Change Stages of Change   [] Pre Contemplation  [] Contemplation  [x] Preparation  [] Action  [] Maintenance  [] Relapse [] Pre Contemplation  [] Contemplation  [] Preparation  [] Action  [] Maintenance  [] Relapse [] Pre Contemplation  [] Contemplation  [] Preparation  [] Action  [] Maintenance  [] Relapse [] Pre Contemplation  [] Contemplation  [] Preparation  [] Action  [] Maintenance  [] Relapse [] Pre Contemplation  [] Contemplation  [] Preparation  [] Action  [] Maintenance  [] Relapse [] Pre Contemplation  [] Contemplation  [] Preparation  [] Action  [] Maintenance  [] Relapse   PSYCHOSOCIAL ASSESSMENT PSYCHOSOCIAL ASSESSMENT PSYCHOSOCIAL ASSESSMENT PSYCHOSOCIAL ASSESSMENT PSYCHOSOCIAL ASSESSMENT PSYCHOSOCIAL ASSESSMENT   Behavioral Outcomes Behavioral Outcomes Behavioral Outcomes Behavioral Outcomes Behavioral Outcomes Behavioral Outcomes   PHQ-9 score 19  Depression Severity  []Minimal  []Mild   []Moderate  [x]Moderately Severe  []Severe    PHQ-9 score **  Depression Severity  []Minimal  []Mild   []Moderate  []Moderately Severe []Severe PHQ-9 score **  Depression Severity  []Minimal  []Mild   []Moderate  []Moderately Severe []Severe   Does patient have Family Support? [x] Yes      [] No  No signs of marital/family distress        Within the Past Month:  *Have you wished you were dead or wished you could go to sleep and not wake up? [] Yes      [x] No  *Have you had any thoughts of killing yourself? [] Yes      [x] No          Using a scale of 0-10, 0=none, 10=very:   Rate your depression: 8  Rate your anxiety:  8  Using a scale of 0-10, 0=none, 10=very:   Rate your depression: **  Rate your anxiety:  ** Using a scale of 0-10, 0=none, 10=very:   Rate your depression: **  Rate your anxiety:  ** Using a scale of 0-10, 0=none, 10=very:   Rate your depression: **  Rate your anxiety:  ** Using a scale of 0-10, 0=none, 10=very:   Rate your depression: **  Rate your anxiety:  ** Using a scale of 0-10, 0=none, 10=very:   Rate your depression: **  Rate your anxiety:  **   Signs and Symptoms of Depression Present? [x] Yes      [] No  If yes, please explain:  pt reports that her heart failure was a surprise, doctors think it is viral. She has had to completely change her everyday life. No longer working or able to do the things she used to.  Her  tries to be supportive but she states that it isn't the type of support she needs. She doesn't feel confident in her ability to do every day activities. Signs and Symptoms of Depression Present? [] Yes      [] No  If yes, please explain:  ** Signs and Symptoms of Depression Present? [] Yes      [] No  If yes, please explain:  ** Signs and Symptoms of Depression Present? [] Yes      [] No  If yes, please explain:  ** Signs and Symptoms of Depression Present? [] Yes      [] No  If yes, please explain:  ** Signs and Symptoms of Depression Present? [] Yes      [] No  If yes, please explain:  **   Signs and Symptoms of Anxiety Present? [x] Yes      [] No  If yes, please explain:  see above  Signs and Symptoms of Anxiety Present? [] Yes      [] No  If yes, please explain:  ** Signs and Symptoms of Anxiety Present? [] Yes      [] No  If yes, please explain:  ** Signs and Symptoms of Anxiety Present? [] Yes      [] No  If yes, please explain:  ** Signs and Symptoms of Anxiety Present? [] Yes      [] No  If yes, please explain:  ** Signs and Symptoms of Anxiety Present? [] Yes      [] No  If yes, please explain:  **   [] Patient has poor eye contact   [] Flat affect present. [] Signs of anxiety, anger or hostility    [] Signs social isolation present.    []  Signs of alcohol or substance abuse        PSYCHOSOCIAL PLAN PSYCHOSOCIAL PLAN PSYCHOSOCIAL PLAN PSYCHOSOCIAL PLAN PSYCHOSOCIAL PLAN PSYCHOSOCIAL PLAN   *Interventions* *Interventions* *Interventions* *Interventions* *Interventions* *Interventions*   *Please check if needed  [] Psych Consult  [] Physician Referral  [x] Stress Management Skills *Please check if needed  [] Psych Consult  [] Physician Referral  [] Stress Management Skills *Please check if needed  [] Psych Consult  [] Physician Referral  [] Stress Management Skills *Please check if needed  [] Psych Consult  [] Physician Referral  [] Stress Management Skills *Please check if needed  [] Psych Consult  [] Physician Referral  [] Stress Management Skills *Please check if needed  [] Psych Consult  [] Physician Referral  [] Stress Management Skills   Is patient currently taking anti-depressant or anti-anxiety medications? [x] Yes      [] No  If yes, please list medications:  EFFEXOR Change in anti-depressant or anti-anxiety medications? [] Yes      [] No  If yes, please list medications:  ** Change in anti-depressant or anti-anxiety medications? [] Yes      [] No  If yes, please list medications:  ** Change in anti-depressant or anti-anxiety medications? [] Yes      [] No  If yes, please list medications:  ** Change in anti-depressant or anti-anxiety medications? [] Yes      [] No  If yes, please list medications:  ** Change in anti-depressant or anti-anxiety medications? [] Yes      [] No  If yes, please list medications:  **   *Education* *Education* *Education* *Education* *Education* *Education*   Healthy Mind-Set Workshops Recommended  [x] Stress & Health  [x] Taking Charge of Stress  [x] New Thoughts, New Behaviors  [x] Managing Moods & Relationships Healthy Mind-Set Workshops Attended/Date Healthy Mind-Set Workshops Attended/Date Healthy Mind-Set Workshops Attended/Date Healthy Mind-Set Workshops  Completed  [] Yes      [] No Healthy Mind-Set Workshops  Completed  [] Yes      [] No   *Goals* *Goals* *Goals* *Goals* *Goals* *Goals*   Lisa's psychosocial goals are as follows:  Complete HADS & Abilio Stratton, Quality of Life Index, Cardiac Version IV Lisa's psychosocial goals are as follows:  [] Attend Healthy Mind-Set Workshops  [] Reduce depression symptom severity by 1 level  [] ** Lisa's psychosocial goals are as follows:  [] Attend Healthy Mind-Set Workshops  [] Reduce depression symptom severity by 1 level  [] ** Lisa's psychosocial goals are as follows:  [] Attend Healthy Mind-Set Workshops  [] Reduce depression symptom severity by 1 level  [] ** Lisa achieved psychosocial goals?   [] Yes    [] No  If no, why?  **  [] Use methods learned to continue to reduce depression symptom severity by 1 level  [] ** Lisa achieved psychosocial goals?   [] Yes    [] No  If no, why?  **  [] Use methods learned to continue to reduce depression symptom severity by 1 level  [] **     Individual Cardiac Treatment Plan - Other:  Risk Factor/Education  RISK FACTOR  ASSESSMENT/PLAN RISK FACTOR  REASSESSMENT  RISK FACTOR  REASSESSMENT RISK FACTOR  REASSESSMENT RISK FACTOR   DISCHARGE/FOLLOW-UP RISK FACTOR   DISCHARGE/FOLLOW-UP   Stages of Change Stages of Change Stages of Change Stages of Change Stages of Change Stages of Change   [] Pre Contemplation  [x] Contemplation  [] Preparation  [] Action  [] Maintenance  [] Relapse [] Pre Contemplation  [] Contemplation  [] Preparation  [] Action  [] Maintenance  [] Relapse [] Pre Contemplation  [] Contemplation  [] Preparation  [] Action  [] Maintenance  [] Relapse [] Pre Contemplation  [] Contemplation  [] Preparation  [] Action  [] Maintenance  [] Relapse [] Pre Contemplation  [] Contemplation  [] Preparation  [] Action  [] Maintenance  [] Relapse [] Pre Contemplation  [] Contemplation  [] Preparation  [] Action  [] Maintenance  [] Relapse   RISK FACTOR/EDUCATION ASSESSMENT RISK FACTOR/EDUCATION ASSESSMENT RISK FACTOR/EDUCATION ASSESSMENT RISK FACTOR/EDUCATION ASSESSMENT RISK FACTOR /EDUCATION ASSESSMENT RISK FACTOR /EDUCATION ASSESSMENT   Hypertension  [] Yes      [x] No    Resting BP: 88/66  Peak Ex BP:94/66  Medication: toprol, JARDIANCE   Hypertension  Resting BP: **  Peak Ex BP:**  Medication Changes:  [] Yes      [] No Hypertension  Resting BP: **  Peak Ex BP:**  Medication Changes:  [] Yes      [] No Hypertension  Resting BP: **  Peak Ex BP:**  Medication Changes:  [] Yes      [] No Hypertension  Resting BP: **  Peak Ex BP:**  Medication Changes:  [] Yes      [] No Hypertension  Resting BP: **  Peak Ex BP:**  Medication Changes:  [] Yes      [] No   Lipids  HLD/DLD  [] Yes      [x] No  TOTAL CHOL: 206  HDL:  62  LDL: 121  TRI  Medication: na Lipids  Medication Changes:  [] Yes      [] No     Lipids  Medication Changes:  [] Yes      [] No     Lipids  Medication Changes:  [] Yes      [] No     Lipids    TOTAL CHOL: **  HDL:  **  LDL:  **  TRIG:  **  Medication Changes:  [] Yes      [] No Lipids    TOTAL CHOL: **  HDL:  **  LDL:  **  TRIG:  **  Medication Changes:  [] Yes      [] No   Diabetes  [] Yes      [x] No  FBS: 91           HbA1C: na        Monitor BS @ home:   [] Yes      [x] No   Diabetes  Most Recent BS:  BS have been in range  [] Yes      [] No  Medication Changes  [] Yes      [] No   Diabetes  Most Recent BS:  BS have been in range  [] Yes      [] No  Medication Changes  [] Yes      [] No     Diabetes  Most Recent BS:  BS have been in range  [] Yes      [] No  Medication Changes  [] Yes      [] No     Diabetes  Most Recent BS:  BS have been in range  [] Yes      [] No  Medication Changes  [] Yes      [] No Diabetes  Most Recent BS:  BS have been in range  [] Yes      [] No  Medication Changes  [] Yes      [] No       Tobacco Use  [] Current  [x] Former  [] Never     Date Quit: 2022   Tobacco Use  Change in smoking status   [] Yes      [] No    Quit date: **   Tobacco Use  Change in smoking status   [] Yes      [] No    Quit date: **   Tobacco Use  Change in smoking status   [] Yes      [] No    Quit date: ** Tobacco Use  Change in smoking status   [] Yes      [] No    Quit date: ** Tobacco Use  Change in smoking status   [] Yes      [] No    Quit date: **             Learning Barriers  Please select one:  [] Speech  [] Literacy  [] Hearing  [] Cognitive  [] Vision  [x] Ready to Learn Learning Barriers Addressed:   [] Yes      [] No   Learning Barriers Addressed:   [] Yes      [] No   Learning Barriers Addressed:  [] Yes      [] No Learning Barriers Addressed:  [] Yes      [] No Learning Barriers Addressed:  [] Yes      [] No     RISK FACTOR/EDUCATION PLAN RISK FACTOR/EDUCATION PLAN RISK FACTOR/EDUCATION PLAN RISK FACTOR/EDUCATION PLAN RISK FACTOR/EDUCATION PLAN RISK FACTOR/EDUCATION PLAN   *Interventions* *Interventions* *Interventions* *Interventions* *Interventions* *Interventions*   Recommended Educational Videos    [x] Overview of The Pritikin Eating Plan  [x] Heart Disease Risk Reduction  [x] Move It! [x] Calorie Density  [x] Healthy Minds, Bodies, Hearts  [x] Label Reading  [x] Metabolic Syndrome & Belly   Or  [] How Our Thoughts Can Heal our Heart  [x] Dining Out-Part 1  [x] Biomechanical Limitations  [x] Facts on Fat  [x] Hypertension & Heart Disease  [x] Diseases of Our Times-Focusing on Diabetes  [x] Body Composition  [x] Nurtition Action Plan  [x] Exercise Action Plan   Completed Videos/Date      10/18/22  Overview of The Pritikin Eating Plan Completed Videos/Date Completed Videos/Date Completed Videos/Date Recommended Educational Videos Completed    [] Yes      [] No    **If not completed, Why? **           Smoking Cessation/Relaspe Prevention Intervention needed? [x] Yes      [] No  *Pt verbalizes and agrees to attend intervention Smoking Cessation/Relapse Prevention Scheduled? [] Yes      [] No  Date:  ** Smoking Cessation/Relapse Prevention completed? [] Yes      [] No  Date: ** Smoking Cessation/Relapse Prevention completed? [] Yes      [] No  Date: ** Smoking Cessation/Relapse Prevention completed?    [] Yes      [] No  Date: ** Smoking Cessation Counseling attended  [] Yes      [] No  **If not completed, Why? **   Professional Referrals:  *Please check if needed  [] Diabetes Clinic  [] Lipid Clinic   [] Other:      Professional Referrals:  *Please check if needed  [] Diabetes Clinic  [] Lipid Clinic   [] Other:   Preventative Medication Preventative Medication Preventative Medication Preventative Medication Preventative Medication Preventative Medication   Aspirin  [x] Yes    [] No  Blood Thinner: Clopidogrel/Effient/Brillinta  [] Yes    [x] No  Beta Blocker  [x] Yes    [] No  Ace Inhibitor  [] Yes    [x] No  Statin/Lipid Lowering  [] Yes    [x] No Medication Changes? [] Yes    [] No Medication Changes? [] Yes    [] No Medication Changes? [] Yes    [] No Medication Changes? [] Yes    [] No Medication Changes? [] Yes    [] No   *Education* *Education* *Education* *Education* *Education* *Education*   Does Lisa require any additional education? [] Yes    [x] No   Does Lisa require any additional education? [] Yes    [] No Does Lisa require any additional education? [] Yes    [] No Does Lisa require any additional education? [] Yes    [] No Does Lisa require any additional education? [] Yes    [] No Does Lisa require any additional education?   [] Yes    [] No   Additional Educational Videos    Exercise  [] Improve Performance    Medical  [] Aging Enhancing Your QoL  [] Biology of Weight Control  [] Decoding Lab Results  [] Diseases of Our Time - Overview  [] Sleep Disorders    Nutrition  [] Dining Out - Part 2  [] Fueling a Healthy Body  [] Menu Workshop  [] Planning Your Eating Strategy  [] Targeting Your Nutrition Priorities  [] Vitamins & Minerals    Healthy Mind-Set  [] Smoking Cessation    Culinary  []Becoming a Pritikin    [] Cooking - Breakfast & Snacks  [] Cooking -Healhty Salads & Dressing  [] Cooking -Dinner & Sides  [] Cooking -Soups & Desserts    Overview  [] The Pritikin Solution Additional Educational Videos Completed Additional Educational Videos Completed Additional Educational Videos Completed Additional Educational Videos Completed Additional Educational Videos Completed    [] Yes    [] No   *Goals* *Goals* *Goals* *Goals* *Goals* *Goals*   Lisa's risk factor/education goals are as follows:    [x] Optimal BP <140/90  [] Blood Sugar <120  [x] Attend recommended video education sessions  [x] Takes medications as prescribed 100% of the time    Lisa's risk factor/education goals are as follows:    [x] Optimal BP <140/90  [] Blood Sugar <120  [x] Attend recommended video education sessions  [x] Takes medications as prescribed 100% of the time   [] ** Lisa's risk factor/education goals are as follows:    [x] Optimal BP <140/90  [] Blood Sugar <120  [x] Attend recommended video education sessions  [x] Takes medications as prescribed 100% of the time   [] ** Lisa's risk factor/education goals are as follows:    [x] Optimal BP <140/90  [] Blood Sugar <120  [x] Attend recommended video education sessions  [x] Takes medications as prescribed 100% of the time   [] ** Lisa's risk factor/education goals are as follows:    [x] Optimal BP <140/90  [] Blood Sugar <120  [x] Attend recommended video education sessions  [x] Takes medications as prescribed 100% of the time   [] ** Lisa achieved risk factor goals?   [] Yes    [] No  If no, why?  **     Monitored telemetry has revealed sinus tach    Monitored telemetry has revealed **  [] documented arrhythmia at increasing workloads  [] associated symptoms ** Monitored telemetry has revealed  [] documented arrhythmia at increasing workloads  [] associated symptoms ** Monitored telemetry has revealed **  [] documented arrhythmia at increasing workloads  [] associated symptoms ** Monitored telemetry has revealed **  [] documented arrhythmia at increasing workloads  [] associated symptoms ** Monitored telemetry has revealed **  [] documented arrhythmia at increasing workloads  [] associated symptoms **   Physician Response    [x] Cardiac rehab is reasonably and medically necessary for continuous cardiac monitoring surveillance  of patient's cardiac activity  [x] Initiate continuous telemetry monitoring and notify me with any concerns  [] Other   Physician Response    [x] Cardiac rehab is reasonably and medically necessary for continuous cardiac monitoring surveillance  of patient's cardiac activity  [x] Continue continuous telemetry monitoring and notify me with any concerns  [] Other     Physician Response    [x] Cardiac rehab is reasonably and medically necessary for continuous cardiac monitoring surveillance  of patient's cardiac activity  [x] Continue continuous telemetry monitoring and notify me with any concerns   [] Other     Physician Response    [x] Cardiac rehab is reasonably and medically necessary for continuous cardiac monitoring surveillance  of patient's cardiac activity  [x] Continue continuous telemetry monitoring and notify me with any concerns   [] Other     Physician Response    [x] Cardiac rehab is reasonably and medically necessary for continuous cardiac monitoring surveillance  of patient's cardiac activity  [x] Continue continuous telemetry monitoring and notify me with any concerns   [] Other

## 2022-10-24 ENCOUNTER — HOSPITAL ENCOUNTER (OUTPATIENT)
Dept: CARDIAC REHAB | Age: 52
Setting detail: THERAPIES SERIES
Discharge: HOME | End: 2022-10-24
Payer: COMMERCIAL

## 2022-10-24 PROCEDURE — G0422 INTENS CARDIAC REHAB W/EXERC: HCPCS

## 2022-10-24 PROCEDURE — G0423 INTENS CARDIAC REHAB NO EXER: HCPCS

## 2022-10-24 NOTE — PROGRESS NOTES
Video Education Report - ICR/CR  Name:  David Damian     Date:  10/24/2022  MRN: [id number]     Session #:  2  Session Length: 40 min    Core Videos        [x]Heart Disease Risk Reduction       []Overview of Pritikin Eating Plan      []Move it          []Calorie Density         []Healthy Minds, Bodies, Hearts        []Label Reading - Part 1       []Metabolic Syndrome and Belly Fat        []How Our Thoughts Can Heal Our Hearts   []Dining Out - Part 1      []Biomechanical Limitations  []Facts on Fat        []Hypertension & Heart Disease    []Diseases of Our Time - Focusing on Diabetes   []Body Composition  []Nutrition Action Plan    []Exercise Action Plan    Comments:  Video completed, group discussion

## 2022-10-26 ENCOUNTER — HOSPITAL ENCOUNTER (OUTPATIENT)
Dept: CARDIAC REHAB | Age: 52
Setting detail: THERAPIES SERIES
Discharge: HOME | End: 2022-10-26
Payer: COMMERCIAL

## 2022-10-26 PROCEDURE — G0423 INTENS CARDIAC REHAB NO EXER: HCPCS

## 2022-10-26 PROCEDURE — G0422 INTENS CARDIAC REHAB W/EXERC: HCPCS

## 2022-10-26 NOTE — PROGRESS NOTES
Video Education Report - ICR/CR  Name:  Val Griffiths     Date:  10/26/2022  MRN: [id number]     Session #:    Session Length: 40 min    Core Videos        []Heart Disease Risk Reduction       []Overview of Pritikin Eating Plan      []Move it          []Calorie Density         []Healthy Minds, Bodies, Hearts        []Label Reading - Part 1       []Metabolic Syndrome and Belly Fat        []How Our Thoughts Can Heal Our Hearts   []Dining Out - Part 1      []Biomechanical Limitations  []Facts on Fat        []Hypertension & Heart Disease    []Diseases of Our Time - Focusing on Diabetes   []Body Composition  []Nutrition Action Plan    []Exercise Action Plan    Exercise      Healthy Mind-Set  []Improving Performance    []Smoking Cessation    []Introduction to [address]  []Aging-Enhancing the Quality of Your Life  []Becoming a Pritikin   []Biology of Weight Control    []Cooking Breakfasts   []Decoding Lab Results    and Snacks  []Diseases of Our Time - Overview   []Cooking Dinner and   []Sleep Disorders     Sides  []Targeting Your Nutrition Priorities   []Healthy Salads &   Dressings  Nutrition      []Cooking Soups and   []Dining Out - Part 2     Desserts  []Fueling a Healthy Body   []Menu Workshop     Overview  [x]Planning Your Eating Strategy   []The Pritikin Solution  []Vitamins and Minerals    Comments:  Video completed, group discussion

## 2022-10-28 ENCOUNTER — HOSPITAL ENCOUNTER (OUTPATIENT)
Dept: CARDIAC REHAB | Age: 52
Setting detail: THERAPIES SERIES
Discharge: HOME | End: 2022-10-28
Payer: COMMERCIAL

## 2022-10-28 PROCEDURE — G0423 INTENS CARDIAC REHAB NO EXER: HCPCS

## 2022-10-28 PROCEDURE — G0422 INTENS CARDIAC REHAB W/EXERC: HCPCS

## 2022-10-31 ENCOUNTER — HOSPITAL ENCOUNTER (OUTPATIENT)
Dept: CARDIAC REHAB | Age: 52
Setting detail: THERAPIES SERIES
Discharge: HOME | End: 2022-10-31
Payer: COMMERCIAL

## 2022-10-31 PROCEDURE — G0422 INTENS CARDIAC REHAB W/EXERC: HCPCS

## 2022-10-31 PROCEDURE — G0423 INTENS CARDIAC REHAB NO EXER: HCPCS

## 2022-10-31 NOTE — PROGRESS NOTES
Video Education Report - ICR/CR  Name:  Cindy Vega     Date:  10/31/2022  MRN: [id number]     Session #:   Session Length: 40 min    Core Videos        []Heart Disease Risk Reduction       []Overview of Pritikin Eating Plan      [x]Move it          []Calorie Density         []Healthy Minds, Bodies, Hearts        []Label Reading - Part 1       []Metabolic Syndrome and Belly Fat        []How Our Thoughts Can Heal Our Hearts   []Dining Out - Part 1      []Biomechanical Limitations  []Facts on Fat        []Hypertension & Heart Disease    []Diseases of Our Time - Focusing on Diabetes   []Body Composition  []Nutrition Action Plan    []Exercise Action Plan      Comments:  Video completed, group discussion

## 2022-11-02 ENCOUNTER — HOSPITAL ENCOUNTER (OUTPATIENT)
Dept: CARDIAC REHAB | Age: 52
Setting detail: THERAPIES SERIES
Discharge: HOME | End: 2022-11-02
Payer: COMMERCIAL

## 2022-11-02 PROCEDURE — G0423 INTENS CARDIAC REHAB NO EXER: HCPCS

## 2022-11-02 PROCEDURE — G0422 INTENS CARDIAC REHAB W/EXERC: HCPCS

## 2022-11-02 NOTE — PROGRESS NOTES
Oneil RAMSEY.:  1970    Acct Number: [de-identified]   MRN:  [id number]                             [facility] HEALTHY MIND-SET WORKSHOP                        Date: 2022        Session #________    Todays class covered:    ( )  Stress and Health Workshop:  [facility] patient will learn about the body's adaptive response that is triggered by a variety of stressors. Patient will gain insight into the toll that chronic stress takes on their health, both emotionally and physically. ( ) Taking Charge of Stress Workshop:  [facility] patient will learn and practice a variety of stress management techniques. Patient will be able to effectively apply coping mechanisms in perceived stressful situations. ( ) New Thoughts New Behaviors Workshop: [facility] patient will learn and practice techniques for developing effective health and lifestyle goals. Patient will be able to effectively apply the goal setting process learned to develop at least one new personal goal.     (X ) Managing Moods & Relationships Workshop:  [facility] patient will learn how emotional and chronic stress factors can impact their hearts. They will learn healthy ways to handle stress and utilize positive coping mechanisms. In addition, [facility] patient will learn ways to improve communication skills. Lisa actively participated and verbalized understanding. Total time in the Healthy Mind-Set class was 60 minutes.     Electronically signed by DESTINEE John on 2022 at 4:22 PM

## 2022-11-03 ENCOUNTER — HOSPITAL ENCOUNTER (OUTPATIENT)
Dept: NON INVASIVE DIAGNOSTICS | Age: 52
Discharge: HOME | End: 2022-11-03
Payer: COMMERCIAL

## 2022-11-03 DIAGNOSIS — I50.22 CHF (CONGESTIVE HEART FAILURE), NYHA CLASS II, CHRONIC, SYSTOLIC (HCC): ICD-10-CM

## 2022-11-03 LAB
LV EF: 23 %
LVEF MODALITY: NORMAL

## 2022-11-03 PROCEDURE — 93307 TTE W/O DOPPLER COMPLETE: CPT

## 2022-11-04 ENCOUNTER — TELEPHONE (OUTPATIENT)
Dept: CARDIOLOGY CLINIC | Age: 52
End: 2022-11-04

## 2022-11-04 ENCOUNTER — APPOINTMENT (OUTPATIENT)
Dept: CARDIAC REHAB | Age: 52
End: 2022-11-04
Payer: COMMERCIAL

## 2022-11-04 DIAGNOSIS — R53.83 FATIGUE, UNSPECIFIED TYPE: Primary | ICD-10-CM

## 2022-11-04 DIAGNOSIS — R06.83 SNORING: ICD-10-CM

## 2022-11-04 DIAGNOSIS — I50.22 CHF (CONGESTIVE HEART FAILURE), NYHA CLASS II, CHRONIC, SYSTOLIC (HCC): ICD-10-CM

## 2022-11-04 RX ORDER — FUROSEMIDE 20 MG/1
20 TABLET ORAL DAILY PRN
Qty: 30 TABLET | Refills: 1 | Status: SHIPPED | OUTPATIENT
Start: 2022-11-04

## 2022-11-04 NOTE — TELEPHONE ENCOUNTER
90 day f/u ECHO reviewed - EF 20-25% (up from 10-15%)  Called and spoke to pt. She continues w/ Lifevest, no issues. Feeling good but c/o very poor sleep, up most night then tired all day. PCP has tried sleeping aids. Pt notes she snores. Does not feel rested. Agrees to Sleep eval = referral placed. Will continue to attempt optimization of meds - BP runs low but stable (-120)  Instructed to increase Entresto to 49/51 BID and changed Lasix to PRN from 20/day. Need close eye on K+ - runs high  Get BMP in 10 days - continue to avoid high K+ foods. Has appt w/ Andre Layer 11/14 - will discuss continuing Lifevest another 90 days vs ICD implant w/ him.     Of note EKGs reviewed, appears pt has LBBB = QRS is greater than 150 on all 3 EKGs

## 2022-11-07 ENCOUNTER — APPOINTMENT (OUTPATIENT)
Dept: CARDIAC REHAB | Age: 52
End: 2022-11-07
Payer: COMMERCIAL

## 2022-11-07 ENCOUNTER — HOSPITAL ENCOUNTER (OUTPATIENT)
Dept: CARDIAC REHAB | Age: 52
Setting detail: THERAPIES SERIES
End: 2022-11-07
Payer: COMMERCIAL

## 2022-11-09 ENCOUNTER — HOSPITAL ENCOUNTER (OUTPATIENT)
Dept: CARDIAC REHAB | Age: 52
Setting detail: THERAPIES SERIES
Discharge: HOME | End: 2022-11-09
Payer: COMMERCIAL

## 2022-11-09 PROCEDURE — G0423 INTENS CARDIAC REHAB NO EXER: HCPCS

## 2022-11-09 PROCEDURE — G0422 INTENS CARDIAC REHAB W/EXERC: HCPCS

## 2022-11-09 NOTE — PROGRESS NOTES
Video Education Report - ICR/CR  Name:  Cindy Vega     Date:  11/9/2022  MRN: [id number]     Session #:  7  Session Length: 40 min    Core Videos        []Heart Disease Risk Reduction       []Overview of Pritikin Eating Plan      []Move it          [x]Calorie Density         []Healthy Minds, Bodies, Hearts        []Label Reading - Part 1       []Metabolic Syndrome and Belly Fat        []How Our Thoughts Can Heal Our Hearts   []Dining Out - Part 1      []Biomechanical Limitations  []Facts on Fat        []Hypertension & Heart Disease    []Diseases of Our Time - Focusing on Diabetes   []Body Composition  []Nutrition Action Plan    []Exercise Action Plan        Comments:  Video completed, group discussion

## 2022-11-11 ENCOUNTER — HOSPITAL ENCOUNTER (OUTPATIENT)
Age: 52
Discharge: HOME | End: 2022-11-11
Payer: COMMERCIAL

## 2022-11-11 ENCOUNTER — HOSPITAL ENCOUNTER (OUTPATIENT)
Dept: CARDIAC REHAB | Age: 52
Setting detail: THERAPIES SERIES
Discharge: HOME | End: 2022-11-11
Payer: COMMERCIAL

## 2022-11-11 DIAGNOSIS — I50.22 CHF (CONGESTIVE HEART FAILURE), NYHA CLASS II, CHRONIC, SYSTOLIC (HCC): ICD-10-CM

## 2022-11-11 LAB
ANION GAP SERPL CALCULATED.3IONS-SCNC: 11 MEQ/L (ref 8–16)
BUN BLDV-MCNC: 14 MG/DL (ref 7–22)
CALCIUM SERPL-MCNC: 9.7 MG/DL (ref 8.5–10.5)
CHLORIDE BLD-SCNC: 98 MEQ/L (ref 98–111)
CO2: 28 MEQ/L (ref 23–33)
CREAT SERPL-MCNC: 0.9 MG/DL (ref 0.4–1.2)
DIGOXIN LEVEL: 1.3 NG/ML (ref 0.5–2)
GFR SERPL CREATININE-BSD FRML MDRD: > 60 ML/MIN/1.73M2
GLUCOSE BLD-MCNC: 97 MG/DL (ref 70–108)
POTASSIUM SERPL-SCNC: 4.8 MEQ/L (ref 3.5–5.2)
SODIUM BLD-SCNC: 137 MEQ/L (ref 135–145)

## 2022-11-11 PROCEDURE — 80048 BASIC METABOLIC PNL TOTAL CA: CPT

## 2022-11-11 PROCEDURE — G0423 INTENS CARDIAC REHAB NO EXER: HCPCS

## 2022-11-11 PROCEDURE — G0422 INTENS CARDIAC REHAB W/EXERC: HCPCS

## 2022-11-11 PROCEDURE — 80162 ASSAY OF DIGOXIN TOTAL: CPT

## 2022-11-11 PROCEDURE — 36415 COLL VENOUS BLD VENIPUNCTURE: CPT

## 2022-11-11 NOTE — PROGRESS NOTES
Video Education Report - ICR/CR  Name:  Angie Schwarz     Date:  11/11/2022  MRN: [id number]     Session #:  8  Session Length: 40 min    Core Videos        []Heart Disease Risk Reduction       []Overview of Pritikin Eating Plan      []Move it          []Calorie Density         []Healthy Minds, Bodies, Hearts        []Label Reading - Part 1       []Metabolic Syndrome and Belly Fat        []How Our Thoughts Can Heal Our Hearts   []Dining Out - Part 1      []Biomechanical Limitations  [x]Facts on Fat        []Hypertension & Heart Disease    []Diseases of Our Time - Focusing on Diabetes   []Body Composition  []Nutrition Action Plan    []Exercise Action Plan      Comments:  Video completed, group discussion

## 2022-11-14 ENCOUNTER — HOSPITAL ENCOUNTER (OUTPATIENT)
Dept: CARDIAC REHAB | Age: 52
Setting detail: THERAPIES SERIES
Discharge: HOME | End: 2022-11-14
Payer: COMMERCIAL

## 2022-11-14 ENCOUNTER — OFFICE VISIT (OUTPATIENT)
Dept: CARDIOLOGY CLINIC | Age: 52
End: 2022-11-14
Payer: COMMERCIAL

## 2022-11-14 VITALS
BODY MASS INDEX: 26.68 KG/M2 | HEIGHT: 62 IN | WEIGHT: 145 LBS | DIASTOLIC BLOOD PRESSURE: 68 MMHG | HEART RATE: 88 BPM | SYSTOLIC BLOOD PRESSURE: 124 MMHG

## 2022-11-14 DIAGNOSIS — I42.8 NONISCHEMIC CARDIOMYOPATHY (HCC): Primary | ICD-10-CM

## 2022-11-14 PROCEDURE — G0423 INTENS CARDIAC REHAB NO EXER: HCPCS

## 2022-11-14 PROCEDURE — G0422 INTENS CARDIAC REHAB W/EXERC: HCPCS

## 2022-11-14 PROCEDURE — 99213 OFFICE O/P EST LOW 20 MIN: CPT | Performed by: INTERNAL MEDICINE

## 2022-11-14 NOTE — PLAN OF CARE
[address] [place] [address] Facility-Based Program  Individualized Cardiac Treatment Plan    Patient Name:  Yulissa Giraldo  :  1970  Age:  47 y.o. MRN:  [id number]  Diagnosis: CHF  Date of Event: --   Physician: Maricel Jj  Next Office Visit:  22  Date Entered Program: 10/18/22  Risk Stratifications: [] Low [] Intermediate [x] High  Allergies: No Known Allergies    COVID -19 Screen  Do you have any of the following symptoms:  [] Fever [] Cough [] SOB [] Muscle/Body Ache [] Loss of taste/smell [x] None    Have you traveled outside of the [place]? [] Yes      [x] No    Have you been around anyone who has tested Positive for COVID 19?  [] Yes      [x] No    The following Education has been completed with patient. [x] Wait in the lobby until we call you back to rehab. [x] You must wear a mask while in the medical center, and in cardiac rehab. Please bring your own. [x] Bring your own bottle of water with you.       Individual Cardiac Treatment Plan -EXERCISE  INITIAL 30 DAY 60 DAY 90  DAY FINAL DAY   EXERCISE  ASSESSMENT/PLAN EXERCISE  REASSESSMENT EXERCISE   REASSESSMENT EXERCISE   REASSESSMENT EXERCISE   REASSESSMENT EXERCISE  DISCHARGE/FOLLOW-UP   Date: 10/18/22 Date: 22 Date: Date: Date: Date:   Session #1  First Exercise Session:  10/24/22 Session # 25 Session # ** Session # ** Session # ** Session # **  Last Exercise Session:  **   Stages of Change Stages of Change Stages of Change Stages of Change Stages of Change Stages of Change   [] Pre Contemplation  [x] Contemplation  [] Preparation  [] Action  [] Maintenance  [] Relapse [] Pre Contemplation  [x] Contemplation  [] Preparation  [] Action  [] Maintenance  [] Relapse [] Pre Contemplation  [] Contemplation  [] Preparation  [] Action  [] Maintenance  [] Relapse [] Pre Contemplation  [] Contemplation  [] Preparation  [] Action  [] Maintenance  [] Relapse [] Pre Contemplation  [] Contemplation  [] Preparation  [] Action  [] Maintenance  [] Relapse [] Pre Contemplation  [] Contemplation  [] Preparation  [] Action  [] Maintenance  [] Relapse   EXERCISE ASSESSMENT EXERCISE ASSESSMENT EXERCISE ASSESSMENT EXERCISE ASSESSMENT EXERCISE ASSESSMENT EXERCISE ASSESSMENT   6 Min Walk Test  Distance walked:   0.22 miles  1161 ft.  2.6 METs  Max HR:121 BPM      RPE:  13    THR:  129-142  Rhythm:  Sinus tach      6 Min Walk Test  Distance walked:   ** miles  ** ft  ** METs  Max HR:** BPM      RPE:  **  %Change ft= **    Rhythm:  **   DASI: 5.7 METs DASI: 7. 0METs DASI: ** METs DASI: ** METs DASI: ** METs DASI: ** METs   Return to Work  Lisa plans on returning to work? [x] Yes  - pt might return to work if her echo shows improvements          [] No   [] Disabled     [] Retired     Return to work: Has Lisa returned to work? [] Yes    [x] No    Return to work date set? [] Yes,  [x] No     Return to work: Has Lisa returned to work? [] Yes    [] No    Return to work date set? [] Yes, **    [] No    Lisa is doing ** at work. Return to work: Has Lisa returned to work? [] Yes    [] No    Return to work date set? [] Yes, **    [] No    Lisa is doing ** at work. Return to work: Has Lisa returned to work? [] Yes    [] No    Return to work date set? [] Yes, **    [] No    Lisa is doing ** at work. Return to work: Has Lisa returned to work? [] Yes    [] No    Return to work? [] Yes, **    [] No    *Required MET Level achieved for job duties?    [] Yes    [] No   Orthopedic Limitations/  [] Yes    [x] No       Orthopedic Limitations   Orthopedic Limitations   Orthopedic Limitations   Orthopedic Limitations Orthopedic Limitations     Fall Risk    []Assessed as a fall risk  [x] Not a fall risk      [] Balance Issues       [] Jina Marcum        [] [employer]       [] Wheelchair  Actions needed:  NA    [x] Safety issues reviewed      Fall Risk   Fall Risk Fall Risk Fall Risk Fall Risk   Home Exercise  [x] Yes    [] No  Type: walking and strengthening   Frequency: 3-4d/wk  Duration: 20-25min Home Exercise  [x] Yes    [] No  Type: weights/walking  Frequency:4d/wk  Duration: 30min Home Exercise  [] Yes    [] No  Type: **  Frequency: **  Duration: ** Home Exercise  [] Yes    [] No  Type: **  Frequency: **  Duration: ** Home Exercise  [] Yes    [] No  Type: **  Frequency: **  Duration: ** Home Exercise  [] Yes    [] No  Type: **  Frequency: **  Duration: **   Angina with Activity? [] Yes    [x] No   Angina with Activity? [x] Yes    [] No  Angina Management: pt has been having CP that comes and goes throughout the day. She sees Dr. Sugey Real today and plans to inform him. Angina with Activity? [] Yes    [] No  Angina Management: ** Angina with Activity? [] Yes    [] No  Angina Management: ** Angina with Activity? [] Yes    [] No  Angina Management: ** Angina with Activity?   [] Yes    [] No  Angina Management: **   EXERCISE PLAN EXERCISE PLAN EXERCISE PLAN EXERCISE PLAN EXERCISE PLAN EXERCISE PLAN   *Interventions* *Interventions* *Interventions* *Interventions* *Interventions* *Interventions*   Exercise Prescription  (per physician & CR staff) Exercise Prescription  (per physician & CR staff) Exercise Prescription  (per physician & CR staff) Exercise Prescription  (per physician & CR staff) Exercise Prescription  (per physician & CR staff) Exercise Prescription  (per physician & CR staff)   Cardiovascular Cardiovascular Cardiovascular Cardiovascular Cardiovascular Cardiovascular   Mode:    [x] Treadmill (TM)  [x] Schwinn Airdyne (AD)  [x] Arms Ergometer (AE)  [x] NuStep   MODE:    [x] Treadmill (TM)  [x] Schwinn Airdyne (AD)  [x] Arms Ergometer (AE) MODE:    [] Treadmill (TM)  [] Schwinn Airdyne (AD)  [] Arms Ergometer (AE)  [] NuStep  [] Elliptical (E) MODE:    [] Treadmill (TM)  [] Schwinn Airdyne (AD)  [] Arms Ergometer (AE)  [] NuStep  [] Elliptical (E) MODE:    [] Treadmill (TM)  [] Schwinn Airdyne (AD)  [] Arms Ergometer (AE)  [] NuStep  [] Elliptical (E) MODE:    [] Treadmill (TM)  [] Schwinn Airdyne (AD)  [] Arms Ergometer (AE)  [] NuStep  [] Elliptical (E)   Initial Workloads  TM: [email or web address] 2.8 METs  AD: 0.7 level = 2.6 METs  NS: 50  Galeana= 2.6 METs  AE: 0.7 level = 2.6 METs Current Workloads  TM: 2.6 @ 0%= 3.0 METs  AD:  0.8level = 2.8 METs  AE:  0.4level = 1.9 METs Current Workloads  TM:  @ %=  METs  AD:  level =  METs  NS:   Galeana=  METs  AE:  level =  METs Current Workloads  TM:  @ %=  METs  AD:  level =  METs  NS:   Gaelana=  METs  AE:  level =  METs Current Workloads  TM:  @ %=  METs  AD:  level =  METs  NS:   Galeana=  METs  AE:  level =  METs Current Workloads  TM:  @ %=  METs  AD:  level =  METs  NS:   Galeana=  METs  AE:  level =  METs     Frequency:    ICR: 3x/week  Home: 2-3x/wk Frequency:   ICR: 3x/week  Home: 3x/wk Frequency:  ICR: 3x/week  Home: 3-4x/wk Frequency:  ICR: 3x/week  Home: 3-4x/wk Frequency:  ICR: 3x/week  Home: 3-4x/wk Frequency:  Lisa will continue exercise at  5-7 days/week   Duration:   Total aerobic exercise = 30-45 min    5-8 min/mode Duration:  Total aerobic exercises = 32 min     12-15min/mode Duration:  Total aerobic exercises = ** min     **min/mode Duration:  Total aerobic exercises = ** min     **min/mode Duration:  Total aerobic exercises = ** min     **min/mode Duration:  Total erobic exercise =  60-90 min   Intensity:   MET Level =2.8  RPE = 12-15 Intensity:  Max MET Level = 3.0  RPE = 12-15 Intensity:  Max MET Level = **  RPE = 12-15 Intensity:  Max MET Level = **  RPE = 12-15 Intensity:  Max MET Level = **  RPE = 12-15 Intensity:  Max MET Level = ** RPE = 12-15   Progression: increase aerobic activity up to 15 min over next 4 weeks by increasing time 2-3 min/week.  Progression:  Increase exercise intensity by 5-10% over the next 4 weeks Progression:   Progression: Progression: Progression:  Increase time/intensity when RPE <13, and HR is in [facility] Training Strength Training Strength Training   [x] Yes      [] No  Upper and Lower body strength training 2x/wk    Wt: 5#       Reps:  8-15    *Increase wt. after completing 15 reps with an RPE of <12/13. [x] Yes      [] No  Upper and Lower body strength training 2x/wk    Wt: 5#       Reps:  8-15    *Increase wt. after completing 15 reps with an RPE of <12/13. [] Yes      [] No  Upper and Lower body strength training 2x/wk    Wt: **#       Reps:  8-15    *Increase wt. after completing 15 reps with an RPE of <12/13. [] Yes      [] No  Upper and Lower body strength training 2x/wk    Wt: **#       Reps:  8-15    *Increase wt. after completing 15 reps with an RPE of <12/13. [] Yes      [] No  Upper and Lower body strength training 2x/wk    Wt: **#       Reps:  8-15    *Increase wt. after completing 15 reps with an RPE of <12/13. Continue Strength Training at home   [] Exercise Log & Strength training handout given     Wt: **#       Reps:  8-15    *Increase wt. after completing 15 reps with an RPE of <12/13. Flexibility Flexibility Flexibility Flexibility Flexibility Flexibility   [x] Yes      [] No  25 min session of Core Strength & Flexibility 1x/per week  Attends Core Strength & Flexibility   [x] Yes      [] No Attends Core Strength & Flexibility   [] Yes      [] No Attends Core Strength & Flexibility   [] Yes      [] No Attends Core Strength & Flexibility   [] Yes      [] No Continue Core Strength & Flexibility at home   Home Exercise  *Lisa verbalizes planning to walk 2 days/week for 30 min on days not in rehab. Home Exercise  *Lisa verbalizes planning to walk 4days/week for 30 min on days not in rehab. Home Exercise  *Lisa verbalizes planning to ** ** days/week for ** min on days not in rehab. Home Exercise  *Lisa verbalizes planning to ** ** days/week for ** min on days not in rehab. Home Exercise  *Lisa verbalizes planning to ** ** days/week for ** min on days not in rehab.  Home Exercise  *Lisa verbalizes his/her plan to ** ** days/week for ** min @ **   *Education* *Education* *Education* *Education* *Education* *Education*   RPE Scale  [x] Yes      [] No  Exercise Safety  [x] Yes      [] No  Equipment Orientation  [x] Yes      [] No  S/S to Report  [x] Yes      [] No  Warm Up/Cool Down  [x] Yes      [] No  Home Exercise  [x] Yes      [] No     All Exercise Education Completed  [] Yes      [] No   Exercise Education Recommended    Workshops  [x] Benefits of Exercise  [x] Balance Training & Fall Prevention  [x] Heart Disease Risk Reduction  [x] Yoga & Heart Health Exercise Education Attended/Date     Exercise Education Attended/Date Exercise Education Attended/Date Exercise Education Attended/Date All Sessions Completed? [] Yes  [] No   *Goals* *Goals* *Goals* *Goals* *Goals* *Goals*   Initial Exercise Goals Exercise Goals  Exercise Goals   Exercise Goals  Exercise Goals  Exercise Goals   Lisa plans to:  [x] Attend exercise sessions 3x/wk  [x] initiate home exercise 2-3x/wk for 10-20 min  [x] Increase 6 min walk distance by 10%  [x] Attend Exercise workshops Lisa plans to:  [x] Attend exercise sessions 3x/wk  [x] continue home exercise 2-3x/wk for 20-30 min  [x] Attend Exercise workshops Lisa's plans to:  [x] Attend exercise sessions 3x/wk  [x] continue home exercise 3-4x/wk for 30-45 min  [x] Determine plan of exercise following rehab  [x] Attend Exercise workshops Lisa's plans to:  [x] Attend exercise sessions 3x/wk  [x] continue home exercise 3-4x/wk for 30-45 min  [x] Determine plan of exercise following rehab  [x] Attend Exercise workshops Lisa's plans to:  [x] Attend exercise sessions 3x/wk  [x] continue home exercise 3-4x/wk for 30-45 min  [x] Determine plan of exercise following rehab  [x] Attend Exercise workshops Lisa achieved exercise goals?     []  Yes    [] No  If no, why?  **  [] Increased 6 min walk distance by 10%  [] Currently exercising 30-60 min/day, 5-7days/wk   [] Plans to continue exercise on own  [] Plans to join a local fitness center to continue exercise  [] Does not plan to continue to exercise after rehab   Mutually agreed upon goals:  Lisa would like to build strength and endurance, and be able to walk a 5k again. Progress towards mutually agreed upon goals:  Lisa  is making progress Progress towards mutually agreed upon goals:  Lisa  ** Progress towards mutually agreed upon goals:  Lisa ** Progress towards mutually agreed upon goals:  Lisa  ** Progress towards mutually agreed upon goals:  Lisa  **    *MET level required for above goal:  4.6 METs MET level Achieved:  3. 0METs MET level Achieved:  **METs MET level Achieved:  **METs MET level Achieved:  **METs MET level Achieved:  **METs     Individual Cardiac Treatment Plan - Nutrition  NUTRITION  ASSESSMENT/PLAN NUTRITION  REASSESSMENT NUTRITION   REASSESSMENT NUTRITION   REASSESSMENT NUTRITION  DISCHARGE/FOLLOW-UP NUTRITION  DISCHARGE/FOLLOW-UP   Stages of Change Stages of Change Stages of Change Stages of Change Stages of Change Stages of Change   [] Pre Contemplation  [x] Contemplation  [] Preparation  [] Action  [] Maintenance  [] Relapse [] Pre Contemplation  [x] Contemplation  [] Preparation  [] Action  [] Maintenance  [] Relapse [] Pre Contemplation  [] Contemplation  [] Preparation  [] Action  [] Maintenance  [] Relapse [] Pre Contemplation  [] Contemplation  [] Preparation  [] Action  [] Maintenance  [] Relapse [] Pre Contemplation  [] Contemplation  [] Preparation  [] Action  [] Maintenance  [] Relapse [] Pre Contemplation  [] Contemplation  [] Preparation  [] Action  [] Maintenance  [] Relapse   NUTRITION ASSESSMENT NUTRITION ASSESSMENT NUTRITION ASSESSMENT NUTRITION ASSESSMENT NUTRITION ASSESSMENT NUTRITION ASSESSMENT   Weight Management  Weight: 144        Height: 5'2\"   BMI: 26.3  Weight Management  Weight: 144.2                 Weight Management  Weight: **                  Weight Management  Weight: ** Weight Management  Weight: ** Weight Management  Weight: **                    BMI: **   Eating Plan  Current eating habits: \"does not have much of an appetite, watching sodium and fluid\" Eating Plan  Changes: \"unknown\"  Eating Plan  Changes: Eating Plan  Changes: Eating Plan  Changes: Eating Plan Improvements:   Alcohol Use  [x] none          [] daily  [] weekly      [] special         Diet Assessment Tool:  RATE YOUR PLATE  *Given to patient to complete and return. Diet Assessment Tool:    Score: 54/72        Diet Assessment Tool: RATE YOUR PLATE  Score: **/02   NUTRITION PLAN NUTRITION PLAN NUTRITION PLAN NUTRITION PLAN NUTRITION PLAN NUTRITION PLAN   *Interventions* *Interventions* *Interventions* *Interventions* *Interventions* *Interventions*   Initial Survey given Goal Setting Discussion:   [x] Yes      [] No        Follow Up Survey Reviewed & Goals Updated:     Professional Referral  Please check if needed. [] Dietitian Consult   [] Wt. Management Referral  [] Other:  Professional Referral  Please check if needed. [] Dietitian Consult   [] Wt. Management Referral  [] Other: Professional Referral  Please check if needed. [] Dietitian Consult   [] Wt. Management Referral  [] Other: Professional Referral  Please check if needed. [] Dietitian Consult   [] Wt. Management Referral  [] Other: Professional Referral  Please check if needed. [] Dietitian Consult   [] Wt. Management Referral  [] Other: Professional Referral  Please check if needed. [] Dietitian Consult   [] Wt. Management Referral  [] Other:   *Education* *Education* *Education* *Education* *Education* *Education*   Nutritional Education Recommended    [x] 1:1 Registered Dietitian    Workshops  [x] Label Reading   [x] Menu  [x] Targeting Nutrition Priorities  [x] Mindful Eating   Nutritional Education Attended/Date Nutritional Education Attended/Date Nutritional Education Attended/Date Nutritional Education Attended/Date All Sessions Completed?     [] Yes  [] No   Cooking School  Recommended     [x] Adding Flavor  [x] Fast & Healthy     Breakfasts  [x] Salads & Dressings  [x] Savory Soups  [x] Simple Sides & Sauces  [x] Appetizers &     Snacks  [x] Delicious Desserts  [x] Plant-Based Proteins  [x] Fast Evening Meals  [x] Weekend Breakfasts  [x] Efficiency Cooking  [x] One-Pot [employer]  Sessions Completed   [facility]  Sessions Completed [facility] [employer]  Sessions Completed     [facility] [employer]  Sessions Completed [employer]ing School    # of sessions completed:  **   *Goals* *Goals* *Goals* *Goals* *Goals* *Goals*   Lisa's nutritional goals are as follows:  Complete and return diet survey Lisa's nutritional goals are as follows:  [x] Attend Nutrition Workshops  [x] Attend 1:1   [x] Attend Cooking Classes   Lisa's nutritional goals are as follows:  [] Attend Nutrition Workshops  [] Attend 1:1   [] Attend Cooking Classes  [] Complete and return diet survey  [] ** Lisa's nutritional goals are as follows:  [] Attend Nutrition Workshops  [] Attend 1:1   [] Attend Cooking Classes  [] ** Lisa's nutritional goals are as follows:  [] Attend Nutrition Workshops  [] Attend 1:1   [] Attend Cooking Classes  [] ** Lisa achieved nutritional goals   [] Yes    [] No  If no, why?   Use knowledge gained to continue Pritikin eating plan at home       Individual Cardiac Treatment Plan - Psychosocial  PSYCHOSOCIAL  ASSESSMENT/PLAN PSYCHOSOCIAL  REASSESSMENT PSYCHOSOCIAL   REASSESSMENT PSYCHOSOCIAL   REASSESSMENT PSYCHOSOCIAL  DISCHARGE/FOLLOW-UP PSYCHOSOCIAL  DISCHARGE/FOLLOW-UP   Stages of Change Stages of Change Stages of Change Stages of Change Stages of Change Stages of Change   [] Pre Contemplation  [] Contemplation  [x] Preparation  [] Action  [] Maintenance  [] Relapse [] Pre Contemplation  [] Contemplation  [x] Preparation  [] Action  [] Maintenance  [] Relapse [] Pre Contemplation  [] Contemplation  [] Preparation  [] Action  [] Maintenance  [] Relapse [] Pre Contemplation  [] Contemplation  [] Preparation  [] Action  [] Maintenance  [] Relapse [] Pre Contemplation  [] Contemplation  [] Preparation  [] Action  [] Maintenance  [] Relapse [] Pre Contemplation  [] Contemplation  [] Preparation  [] Action  [] Maintenance  [] Relapse   PSYCHOSOCIAL ASSESSMENT PSYCHOSOCIAL ASSESSMENT PSYCHOSOCIAL ASSESSMENT PSYCHOSOCIAL ASSESSMENT PSYCHOSOCIAL ASSESSMENT PSYCHOSOCIAL ASSESSMENT   Behavioral Outcomes Behavioral Outcomes Behavioral Outcomes Behavioral Outcomes Behavioral Outcomes Behavioral Outcomes   PHQ-9 score 19  Depression Severity  []Minimal  []Mild   []Moderate  [x]Moderately Severe  []Severe    PHQ-9 score **  Depression Severity  []Minimal  []Mild   []Moderate  []Moderately Severe []Severe PHQ-9 score **  Depression Severity  []Minimal  []Mild   []Moderate  []Moderately Severe []Severe   Does patient have Family Support? [x] Yes      [] No  No signs of marital/family distress        Within the Past Month:  *Have you wished you were dead or wished you could go to sleep and not wake up? [] Yes      [x] No  *Have you had any thoughts of killing yourself? [] Yes      [x] No          Using a scale of 0-10, 0=none, 10=very:   Rate your depression: 8  Rate your anxiety:  8  Using a scale of 0-10, 0=none, 10=very:   Rate your depression: 8  Rate your anxiety:  9 Using a scale of 0-10, 0=none, 10=very:   Rate your depression: **  Rate your anxiety:  ** Using a scale of 0-10, 0=none, 10=very:   Rate your depression: **  Rate your anxiety:  ** Using a scale of 0-10, 0=none, 10=very:   Rate your depression: **  Rate your anxiety:  ** Using a scale of 0-10, 0=none, 10=very:   Rate your depression: **  Rate your anxiety:  **   Signs and Symptoms of Depression Present? [x] Yes      [] No  If yes, please explain:  pt reports that her heart failure was a surprise, doctors think it is viral. She has had to completely change her everyday life. No longer working or able to do the things she used to.  Her  tries to be supportive but she states that it isn't the type of support she needs. She doesn't feel confident in her ability to do every day activities. Signs and Symptoms of Depression Present? [x] Yes      [] No  If yes, please explain:  see previous. She is depressed about all her life change she has had to make ( not being able to go out with friends, eat what she wants, work) Signs and Symptoms of Depression Present? [] Yes      [] No  If yes, please explain:  ** Signs and Symptoms of Depression Present? [] Yes      [] No  If yes, please explain:  ** Signs and Symptoms of Depression Present? [] Yes      [] No  If yes, please explain:  ** Signs and Symptoms of Depression Present? [] Yes      [] No  If yes, please explain:  **   Signs and Symptoms of Anxiety Present? [x] Yes      [] No  If yes, please explain:  see above  Signs and Symptoms of Anxiety Present? [x] Yes      [] No  If yes, please explain:  see previous Signs and Symptoms of Anxiety Present? [] Yes      [] No  If yes, please explain:  ** Signs and Symptoms of Anxiety Present? [] Yes      [] No  If yes, please explain:  ** Signs and Symptoms of Anxiety Present? [] Yes      [] No  If yes, please explain:  ** Signs and Symptoms of Anxiety Present? [] Yes      [] No  If yes, please explain:  **   [] Patient has poor eye contact   [] Flat affect present. [] Signs of anxiety, anger or hostility    [] Signs social isolation present.    []  Signs of alcohol or substance abuse        PSYCHOSOCIAL PLAN PSYCHOSOCIAL PLAN PSYCHOSOCIAL PLAN PSYCHOSOCIAL PLAN PSYCHOSOCIAL PLAN PSYCHOSOCIAL PLAN   *Interventions* *Interventions* *Interventions* *Interventions* *Interventions* *Interventions*   *Please check if needed  [] Psych Consult  [] Physician Referral  [x] Stress Management Skills *Please check if needed  [] Psych Consult  [] Physician Referral  [x] Stress Management Skills *Please check if needed  [] Psych Consult  [] Physician Referral  [] Stress Management Skills *Please check if needed  [] Psych Consult  [] Physician Referral  [] Stress Management Skills *Please check if needed  [] Psych Consult  [] Physician Referral  [] Stress Management Skills *Please check if needed  [] Psych Consult  [] Physician Referral  [] Stress Management Skills   Is patient currently taking anti-depressant or anti-anxiety medications? [x] Yes      [] No  If yes, please list medications:  EFFEXOR Change in anti-depressant or anti-anxiety medications? [] Yes      [x] No   Change in anti-depressant or anti-anxiety medications? [] Yes      [] No  If yes, please list medications:  ** Change in anti-depressant or anti-anxiety medications? [] Yes      [] No  If yes, please list medications:  ** Change in anti-depressant or anti-anxiety medications? [] Yes      [] No  If yes, please list medications:  ** Change in anti-depressant or anti-anxiety medications?   [] Yes      [] No  If yes, please list medications:  **   *Education* *Education* *Education* *Education* *Education* *Education*   Healthy Mind-Set Workshops Recommended  [x] Stress & Health  [x] Taking Charge of Stress  [x] New Thoughts, New Behaviors  [x] Managing Moods & Relationships Healthy Mind-Set Workshops Attended/Date    11/2/22 manage moods  Healthy Mind-Set Workshops Attended/Date Healthy Mind-Set Workshops Attended/Date Healthy Mind-Set Workshops  Completed  [] Yes      [] No Healthy Mind-Set Workshops  Completed  [] Yes      [] No   *Goals* *Goals* *Goals* *Goals* *Goals* *Goals*   Lisa's psychosocial goals are as follows:  Complete HADS & Abilio & Viral, Quality of Life Index, Cardiac Version IV Lisa's psychosocial goals are as follows:  [x] Attend Healthy Mind-Set Workshops  [x] Reduce depression symptom severity by 1 level   Lisa's psychosocial goals are as follows:  [] Attend Healthy Mind-Set Workshops  [] Reduce depression symptom severity by 1 level  [] ** Lisa's psychosocial goals are as follows:  [] Attend Healthy Mind-Set Workshops  [] Reduce depression symptom severity by 1 level  [] ** Lisa achieved psychosocial goals? [] Yes    [] No  If no, why?  **  [] Use methods learned to continue to reduce depression symptom severity by 1 level  [] ** Lisa achieved psychosocial goals?   [] Yes    [] No  If no, why?  **  [] Use methods learned to continue to reduce depression symptom severity by 1 level  [] **     Individual Cardiac Treatment Plan - Other:  Risk Factor/Education  RISK FACTOR  ASSESSMENT/PLAN RISK FACTOR  REASSESSMENT  RISK FACTOR  REASSESSMENT RISK FACTOR  REASSESSMENT RISK FACTOR   DISCHARGE/FOLLOW-UP RISK FACTOR   DISCHARGE/FOLLOW-UP   Stages of Change Stages of Change Stages of Change Stages of Change Stages of Change Stages of Change   [] Pre Contemplation  [x] Contemplation  [] Preparation  [] Action  [] Maintenance  [] Relapse [] Pre Contemplation  [x] Contemplation  [] Preparation  [] Action  [] Maintenance  [] Relapse [] Pre Contemplation  [] Contemplation  [] Preparation  [] Action  [] Maintenance  [] Relapse [] Pre Contemplation  [] Contemplation  [] Preparation  [] Action  [] Maintenance  [] Relapse [] Pre Contemplation  [] Contemplation  [] Preparation  [] Action  [] Maintenance  [] Relapse [] Pre Contemplation  [] Contemplation  [] Preparation  [] Action  [] Maintenance  [] Relapse   RISK FACTOR/EDUCATION ASSESSMENT RISK FACTOR/EDUCATION ASSESSMENT RISK FACTOR/EDUCATION ASSESSMENT RISK FACTOR/EDUCATION ASSESSMENT RISK FACTOR /EDUCATION ASSESSMENT RISK FACTOR /EDUCATION ASSESSMENT   Hypertension  [] Yes      [x] No    Resting BP: 88/66  Peak Ex BP:94/66  Medication: toprol, JARDIANCE   Hypertension  Resting BP: 100/66  Peak Ex BP:104/62  Medication Changes:  [] Yes      [x] No Hypertension  Resting BP: **  Peak Ex BP:**  Medication Changes:  [] Yes      [] No Hypertension  Resting BP: **  Peak Ex BP:**  Medication Changes:  [] Yes      [] No Hypertension  Resting BP: **  Peak Ex BP:**  Medication Changes:  [] Yes      [] No Hypertension  Resting BP: **  Peak Ex BP:**  Medication Changes:  [] Yes      [] No   Lipids  HLD/DLD  [] Yes      [x] No  TOTAL CHOL: 206  HDL:  62  LDL:  121  TRI  Medication: na Lipids  Medication Changes:  [] Yes      [x] No     Lipids  Medication Changes:  [] Yes      [] No     Lipids  Medication Changes:  [] Yes      [] No     Lipids    TOTAL CHOL: **  HDL:  **  LDL:  **  TRIG:  **  Medication Changes:  [] Yes      [] No Lipids    TOTAL CHOL: **  HDL:  **  LDL:  **  TRIG:  **  Medication Changes:  [] Yes      [] No   Diabetes  [] Yes      [x] No  FBS: 91           HbA1C: na        Monitor BS @ home:   [] Yes      [x] No   Diabetes  na   Diabetes  Most Recent BS:  BS have been in range  [] Yes      [] No  Medication Changes  [] Yes      [] No     Diabetes  Most Recent BS:  BS have been in range  [] Yes      [] No  Medication Changes  [] Yes      [] No     Diabetes  Most Recent BS:  BS have been in range  [] Yes      [] No  Medication Changes  [] Yes      [] No Diabetes  Most Recent BS:  BS have been in range  [] Yes      [] No  Medication Changes  [] Yes      [] No       Tobacco Use  [] Current  [x] Former  [] Never     Date Quit: 2022   Tobacco Use  Change in smoking status   [] Yes      [x] No       Tobacco Use  Change in smoking status   [] Yes      [] No    Quit date: **   Tobacco Use  Change in smoking status   [] Yes      [] No    Quit date: ** Tobacco Use  Change in smoking status   [] Yes      [] No    Quit date: ** Tobacco Use  Change in smoking status   [] Yes      [] No    Quit date: **             Learning Barriers  Please select one:  [] Speech  [] Literacy  [] Hearing  [] Cognitive  [] Vision  [x] Ready to Learn Learning Barriers Addressed:   [x] Yes      [] No   Learning Barriers Addressed:   [] Yes      [] No   Learning Barriers Addressed:  [] Yes      [] No Learning Barriers Addressed:  [] Yes      [] No Learning Barriers Addressed:  [] Yes      [] No     RISK FACTOR/EDUCATION PLAN RISK FACTOR/EDUCATION PLAN RISK FACTOR/EDUCATION PLAN RISK FACTOR/EDUCATION PLAN RISK FACTOR/EDUCATION PLAN RISK FACTOR/EDUCATION PLAN   *Interventions* *Interventions* *Interventions* *Interventions* *Interventions* *Interventions*   Recommended Educational Videos    [x] Overview of The Pritikin Eating Plan  [x] Heart Disease Risk Reduction  [x] Move It! [x] Calorie Density  [x] Healthy Minds, Bodies, Hearts  [x] Label Reading  [x] Metabolic Syndrome & Belly   Or  [] How Our Thoughts Can Heal our Heart  [x] Dining Out-Part 1  [x] Biomechanical Limitations  [x] Facts on Fat  [x] Hypertension & Heart Disease  [x] Diseases of Our Times-Focusing on Diabetes  [x] Body Composition  [x] Nurtition Action Plan  [x] Exercise Action Plan   Completed Videos/Date      10/18/22  Overview of The Pritikin Eating Plan    Heart disease risk reduction 10/24/22    Move it 10/31/22    Calorie density 11/9/22    Label reading 1 11/14/22    Facts on fat 11/11/22    Planning eating start 10/26/22    Becoming  10/28/22 Completed Videos/Date Completed Videos/Date Completed Videos/Date Recommended Educational Videos Completed    [] Yes      [] No    **If not completed, Why? **           Smoking Cessation/Relaspe Prevention Intervention needed? [x] Yes      [] No  *Pt verbalizes and agrees to attend intervention Smoking Cessation/Relapse Prevention Scheduled? NA Smoking Cessation/Relapse Prevention completed? [] Yes      [] No  Date: ** Smoking Cessation/Relapse Prevention completed? [] Yes      [] No  Date: ** Smoking Cessation/Relapse Prevention completed?    [] Yes      [] No  Date: ** Smoking Cessation Counseling attended  [] Yes      [] No  **If not completed, Why? **   Professional Referrals:  *Please check if needed  [] Diabetes Clinic  [] Lipid Clinic   [] Other:      Professional Referrals:  *Please check if needed  [] Diabetes Clinic  [] Lipid Clinic   [] Other:   Preventative Medication Preventative Medication Preventative Medication Preventative Medication Preventative Medication Preventative Medication   Aspirin  [x] Yes    [] No  Blood Thinner: Clopidogrel/Effient/Brillinta  [] Yes    [x] No  Beta Blocker  [x] Yes    [] No  Ace Inhibitor  [] Yes    [x] No  Statin/Lipid Lowering  [] Yes    [x] No Medication Changes? [] Yes    [x] No Medication Changes? [] Yes    [] No Medication Changes? [] Yes    [] No Medication Changes? [] Yes    [] No Medication Changes? [] Yes    [] No   *Education* *Education* *Education* *Education* *Education* *Education*   Does Lisa require any additional education? [] Yes    [x] No   Does Lisa require any additional education? [] Yes    [x] No Does Lisa require any additional education? [] Yes    [] No Does Lisa require any additional education? [] Yes    [] No Does Lisa require any additional education? [] Yes    [] No Does Lisa require any additional education?   [] Yes    [] No   Additional Educational Videos    Exercise  [] Improve Performance    Medical  [] Aging Enhancing Your QoL  [] Biology of Weight Control  [] Decoding Lab Results  [] Diseases of Our Time - Overview  [] Sleep Disorders    Nutrition  [] Dining Out - Part 2  [] Fueling a Healthy Body  [] Menu Workshop  [] Planning Your Eating Strategy  [] Targeting Your Nutrition Priorities  [] Vitamins & Minerals    Healthy Mind-Set  [] Smoking Cessation    Culinary  []Becoming a Pritikin    [] Cooking - Breakfast & Snacks  [] Cooking -Healhty Salads & Dressing  [] Cooking -Dinner & Sides  [] Cooking -Soups & Desserts    Overview  [] The Pritikin Solution Additional Educational Videos Completed Additional Educational Videos Completed Additional Educational Videos Completed Additional Educational Videos Completed Additional Educational Videos Completed    [] Yes    [] No   *Goals* *Goals* *Goals* *Goals* *Goals* *Goals*   Lisa's risk factor/education goals are as follows:    [x] Optimal BP <140/90  [] Blood Sugar <120  [x] Attend recommended video education sessions  [x] Takes medications as prescribed 100% of the time    Lisa's risk factor/education goals are as follows:    [x] Optimal BP <140/90  [] Blood Sugar <120  [x] Attend recommended video education sessions  [x] Takes medications as prescribed 100% of the time    Lisa's risk factor/education goals are as follows:    [x] Optimal BP <140/90  [] Blood Sugar <120  [x] Attend recommended video education sessions  [x] Takes medications as prescribed 100% of the time   [] ** Lisa's risk factor/education goals are as follows:    [x] Optimal BP <140/90  [] Blood Sugar <120  [x] Attend recommended video education sessions  [x] Takes medications as prescribed 100% of the time   [] ** Lisa's risk factor/education goals are as follows:    [x] Optimal BP <140/90  [] Blood Sugar <120  [x] Attend recommended video education sessions  [x] Takes medications as prescribed 100% of the time   [] ** Lisa achieved risk factor goals?   [] Yes    [] No  If no, why?  **     Monitored telemetry has revealed sinus tach    Monitored telemetry has revealed NSR Monitored telemetry has revealed  [] documented arrhythmia at increasing workloads  [] associated symptoms ** Monitored telemetry has revealed **  [] documented arrhythmia at increasing workloads  [] associated symptoms ** Monitored telemetry has revealed **  [] documented arrhythmia at increasing workloads  [] associated symptoms ** Monitored telemetry has revealed **  [] documented arrhythmia at increasing workloads  [] associated symptoms **   Physician Response    [x] Cardiac rehab is reasonably and medically necessary for continuous cardiac monitoring surveillance  of patient's cardiac activity  [x] Initiate continuous telemetry monitoring and notify me with any concerns  [] Other   Physician Response    [x] Cardiac rehab is reasonably and medically necessary for continuous cardiac monitoring surveillance  of patient's cardiac activity  [x] Continue continuous telemetry monitoring and notify me with any concerns  [] Other     Physician Response    [x] Cardiac rehab is reasonably and medically necessary for continuous cardiac monitoring surveillance  of patient's cardiac activity  [x] Continue continuous telemetry monitoring and notify me with any concerns   [] Other     Physician Response    [x] Cardiac rehab is reasonably and medically necessary for continuous cardiac monitoring surveillance  of patient's cardiac activity  [x] Continue continuous telemetry monitoring and notify me with any concerns   [] Other     Physician Response    [x] Cardiac rehab is reasonably and medically necessary for continuous cardiac monitoring surveillance  of patient's cardiac activity  [x] Continue continuous telemetry monitoring and notify me with any concerns   [] Other       Cosigned by: Kavon Bashir MD at 10/19/2022 11:56 AM     Revision History  Date/Time User Provider Type Action   10/19/2022 11:56 AM Kavon Bashir MD Physician Cosign   10/18/2022  1:46 PM Sherry Barragan Exercise Physiologist Sign

## 2022-11-14 NOTE — PROGRESS NOTES
Pt here for 3 mo check upl     Pt has questions about testing ,    Pt continues with chest pain , notices daily , feels more anxious,

## 2022-11-14 NOTE — PROGRESS NOTES
Video Education Report - ICR/CR  Name:  Fazal Elias     Date:  11/14/2022  MRN: [id number]     Session #:    Session Length: 40 min    Core Videos        []Heart Disease Risk Reduction       []Overview of Pritikin Eating Plan      []Move it          []Calorie Density         []Healthy Minds, Bodies, Hearts        [x]Label Reading - Part 1       []Metabolic Syndrome and Belly Fat        []How Our Thoughts Can Heal Our Hearts   []Dining Out - Part 1      []Biomechanical Limitations  []Facts on Fat        []Hypertension & Heart Disease    []Diseases of Our Time - Focusing on Diabetes   []Body Composition  []Nutrition Action Plan    []Exercise Action Plan    Exercise      Healthy Mind-Set  []Improving Performance    []Smoking Cessation    []Introduction to [address]  []Aging-Enhancing the Quality of Your Life  []Becoming a Pritikin   []Biology of Weight Control    []Cooking Breakfasts   []Decoding Lab Results    and Snacks  []Diseases of Our Time - Overview   []Cooking Dinner and   []Sleep Disorders     Sides  []Targeting Your Nutrition Priorities   []Healthy Salads &   Dressings  Nutrition      []Cooking Soups and   []Dining Out - Part 2     Desserts  []Fueling a Healthy Body   []Menu Workshop     Overview  []Planning Your Eating Strategy   []The Pritikin Solution  []Vitamins and Minerals    Comments:  Video completed, group discussion

## 2022-11-14 NOTE — PROGRESS NOTES
[id number] [address] [place] [address]  Dept: [phone]  Dept Fax: [phone]  Loc: [phone]    Visit Date: 11/14/2022    Ms. Андрей Plascencia is a 47 y.o. female  who presented for:  Chief Complaint   Patient presents with    Check-Up    Cardiomyopathy    Congestive Heart Failure       HPI:   HPI   46 yo F presents for follow up on echo results. She is on GDMT. CMR and TTE shows severe LV dysfunction. No major volume. No chest pain, angina, orthopnea, PND, sob at rest, palpitations, LE edema, or syncope. ECG with LBBB. Current Outpatient Medications:     furosemide (LASIX) 20 MG tablet, Take 1 tablet by mouth daily as needed (for weight gain or swelling), Disp: 30 tablet, Rfl: 1    sacubitril-valsartan (ENTRESTO) 49-51 MG per tablet, Take 1 tablet by mouth 2 times daily, Disp: 60 tablet, Rfl: 5    metoprolol succinate (TOPROL XL) 50 MG extended release tablet, Take 1 tablet by mouth daily TAKE HALF tab if BP less than 110, Disp: 30 tablet, Rfl: 3    empagliflozin (JARDIANCE) 10 MG tablet, Take 1 tablet by mouth in the morning., Disp: 30 tablet, Rfl: 5    digoxin (LANOXIN) 125 MCG tablet, Take 1 tablet by mouth in the morning., Disp: 30 tablet, Rfl: 3    levothyroxine (SYNTHROID) 75 MCG tablet, TAKE 1 TABLET DAILY, Disp: 90 tablet, Rfl: 3    venlafaxine (EFFEXOR XR) 150 MG extended release capsule, TAKE 1 CAPSULE ONCE DAILY (Patient taking differently: Take 150 mg by mouth daily), Disp: 90 capsule, Rfl: 3    ibuprofen (ADVIL;MOTRIN) 200 MG tablet, Take 200 mg by mouth every 6 hours as needed for Pain, Disp: , Rfl:     acetaminophen (TYLENOL) 500 MG tablet, Take 500 mg by mouth every 6 hours as needed for Pain, Disp: , Rfl:     Past Medical History  Lisa  has a past medical history of Depression, Hypercholesterolemia, Hypothyroidism, and New onset of congestive heart failure ([facility] [address]     Social History  Lisa  reports that she has been smoking cigarettes. She has a 2.00 pack-year smoking history. She has never used smokeless tobacco. She reports current alcohol use. She reports that she does not use drugs. Family History  Lisa family history includes Cancer in her father. There is no family history of bicuspid aortic valve, aneurysms, heart transplant, pacemakers, defibrillators, or sudden cardiac death. Past Surgical History   Past Surgical History:   Procedure Laterality Date    ABLATION OF DYSRHYTHMIC FOCUS      BREAST SURGERY  2007    implant bilateral breast    TONSILLECTOMY      TUBAL LIGATION         Review of Systems   Constitutional: Negative for chills and fever  HENT: Negative for congestion, sinus pressure, sneezing and sore throat. Eyes: Negative for pain, discharge, redness and itching. Respiratory: Negative for apnea, cough  Gastrointestinal: Negative for blood in stool, constipation, diarrhea   Endocrine: Negative for cold intolerance, heat intolerance, polydipsia. Genitourinary: Negative for dysuria, enuresis, flank pain and hematuria. Musculoskeletal: Negative for arthralgias, joint swelling and neck pain. Neurological: Negative for numbness and headaches. Psychiatric/Behavioral: Negative for agitation, confusion, decreased concentration and dysphoric mood. Objective:     /68   Pulse 88   Ht 5' 2\" (1.575 m)   Wt 145 lb (65.8 kg)   BMI 26.52 kg/m²     Wt Readings from Last 3 Encounters:   11/14/22 145 lb (65.8 kg)   10/03/22 147 lb (66.7 kg)   08/25/22 149 lb (67.6 kg)     BP Readings from Last 3 Encounters:   11/14/22 124/68   10/03/22 100/60   08/25/22 118/68       Nursing note and vitals reviewed. Physical Exam   Constitutional: Oriented to person, place, and time. Appears well-developed and well-nourished. HENT:   Head: Normocephalic and atraumatic. Eyes: EOM are normal. Pupils are equal, round, and reactive to light. Neck: Normal range of motion. Neck supple.  No JVD present. Cardiovascular: Normal rate, regular rhythm, normal heart sounds and intact distal pulses. No murmur heard. Pulmonary/Chest: Effort normal and breath sounds normal. No respiratory distress. No wheezes. No rales. Abdominal: Soft. Bowel sounds are normal. No distension. There is no tenderness. Musculoskeletal: Normal range of motion. No edema. Neurological: Alert and oriented to person, place, and time. No cranial nerve deficit. Coordination normal.   Skin: Skin is warm and dry. Psychiatric: Normal mood and affect.        No results found for: CKTOTAL, CKMB, CKMBINDEX    Lab Results   Component Value Date/Time    WBC 6.7 08/05/2022 07:06 AM    RBC 5.24 08/05/2022 07:06 AM    RBC 5.26 04/30/2018 09:52 AM    HGB 15.9 08/05/2022 07:06 AM    HCT 49.5 08/05/2022 07:06 AM    MCV 94.5 08/05/2022 07:06 AM    MCH 30.3 08/05/2022 07:06 AM    MCHC 32.1 08/05/2022 07:06 AM    RDW 12.5 04/30/2018 09:52 AM     08/05/2022 07:06 AM    MPV 10.9 08/05/2022 07:06 AM       Lab Results   Component Value Date/Time     11/11/2022 03:24 PM    K 4.8 11/11/2022 03:24 PM    K 3.8 08/03/2022 04:10 PM    CL 98 11/11/2022 03:24 PM    CO2 28 11/11/2022 03:24 PM    BUN 14 11/11/2022 03:24 PM    LABALBU 4.1 08/03/2022 04:10 PM    LABALBU 4.5 10/19/2011 08:05 AM    CREATININE 0.9 11/11/2022 03:24 PM    CALCIUM 9.7 11/11/2022 03:24 PM    LABGLOM >60 11/11/2022 03:20 PM    GLUCOSE 97 11/11/2022 03:24 PM    GLUCOSE 90 04/30/2018 09:52 AM       Lab Results   Component Value Date/Time    ALKPHOS 118 08/03/2022 04:10 PM    ALT 45 08/03/2022 04:10 PM    AST 32 08/03/2022 04:10 PM    PROT 6.5 08/03/2022 04:10 PM    BILITOT 1.1 08/03/2022 04:10 PM    BILIDIR 0.2 10/19/2011 08:05 AM    LABALBU 4.1 08/03/2022 04:10 PM    LABALBU 4.5 10/19/2011 08:05 AM       No results found for: MG    Lab Results   Component Value Date    INR 1.24 (H) 08/04/2022         No results found for: LABA1C    Lab Results   Component Value Date/Time TRIG 116 06/13/2022 11:57 AM    HDL 62 06/13/2022 11:57 AM    LDLCALC 121 06/13/2022 11:57 AM    LABVLDL 18 04/30/2018 09:52 AM       Lab Results   Component Value Date/Time    TSH 2.100 06/13/2022 11:57 AM         Testing Reviewed:      I have individually reviewed the cardiac test below:    ECHO: No results found for this or any previous visit. Assessment/Plan   NICM, NYHA II  No sig CAD  RVSP 29 mmHg  LBBB/IVCD  She is on GDMT, refer to transplant center. She has significant anxiety. No findings of midmyocardial enhancement. No volume exam.  Needs BiV-ICD. Continue LifeVest till ICD. She is losing weight. Continue cardiac rehab. BP and HR well controlled. Discussed diet/exercise/BP/weight loss/health lifestyle choices/lipids; the patient understands the goals and will try to comply.     Disposition:  6 months           Electronically signed by Jocelin Lowry MD   11/14/2022 at 3:21 PM EST

## 2022-11-15 RX ORDER — METOPROLOL SUCCINATE 50 MG/1
50 TABLET, EXTENDED RELEASE ORAL DAILY
Qty: 90 TABLET | Refills: 3 | Status: SHIPPED | OUTPATIENT
Start: 2022-11-15 | End: 2022-11-18 | Stop reason: SDUPTHER

## 2022-11-16 ENCOUNTER — HOSPITAL ENCOUNTER (OUTPATIENT)
Dept: CARDIAC REHAB | Age: 52
Setting detail: THERAPIES SERIES
Discharge: HOME | End: 2022-11-16
Payer: COMMERCIAL

## 2022-11-16 PROCEDURE — G0422 INTENS CARDIAC REHAB W/EXERC: HCPCS

## 2022-11-16 PROCEDURE — G0423 INTENS CARDIAC REHAB NO EXER: HCPCS

## 2022-11-16 NOTE — PROGRESS NOTES
Video Education Report - ICR/CR  Name:  Sandhya Lau     Date:  11/16/2022  MRN: [id number]     Session #:    Session Length: 40 min    Core Videos        []Heart Disease Risk Reduction       []Overview of Pritikin Eating Plan      []Move it          []Calorie Density         []Healthy Minds, Bodies, Hearts        []Label Reading - Part 1       []Metabolic Syndrome and Belly Fat        [x]How Our Thoughts Can Heal Our Hearts   []Dining Out - Part 1      []Biomechanical Limitations  []Facts on Fat        []Hypertension & Heart Disease    []Diseases of Our Time - Focusing on Diabetes   []Body Composition  []Nutrition Action Plan    []Exercise Action Plan      Comments:  Video completed, group discussion

## 2022-11-17 ENCOUNTER — TELEPHONE (OUTPATIENT)
Dept: CARDIOLOGY CLINIC | Age: 52
End: 2022-11-17

## 2022-11-17 DIAGNOSIS — I50.22 CHF (CONGESTIVE HEART FAILURE), NYHA CLASS II, CHRONIC, SYSTOLIC (HCC): Primary | ICD-10-CM

## 2022-11-17 DIAGNOSIS — J90 PLEURAL EFFUSION: ICD-10-CM

## 2022-11-17 DIAGNOSIS — I50.9 NEW ONSET OF CONGESTIVE HEART FAILURE (HCC): ICD-10-CM

## 2022-11-17 DIAGNOSIS — R09.89 CARDIAC LEFT VENTRICULAR EJECTION FRACTION 10-20 PERCENT: ICD-10-CM

## 2022-11-17 NOTE — TELEPHONE ENCOUNTER
Pt LM wanting to know if she could be referred to [facility] per last OV note. Pt is interested in either Dr. Fer Thomas, Dr. Buddy Hernandez, or Dr. Christiano Nails to refer? Any doctor preference from those listed above?

## 2022-11-18 ENCOUNTER — HOSPITAL ENCOUNTER (OUTPATIENT)
Dept: CARDIAC REHAB | Age: 52
Setting detail: THERAPIES SERIES
Discharge: HOME | End: 2022-11-18
Payer: COMMERCIAL

## 2022-11-18 PROCEDURE — G0422 INTENS CARDIAC REHAB W/EXERC: HCPCS

## 2022-11-18 PROCEDURE — G0423 INTENS CARDIAC REHAB NO EXER: HCPCS

## 2022-11-18 RX ORDER — METOPROLOL SUCCINATE 50 MG/1
50 TABLET, EXTENDED RELEASE ORAL DAILY
Qty: 90 TABLET | Refills: 2 | Status: SHIPPED | OUTPATIENT
Start: 2022-11-18 | End: 2022-11-18 | Stop reason: DRUGHIGH

## 2022-11-18 RX ORDER — METOPROLOL SUCCINATE 100 MG/1
100 TABLET, EXTENDED RELEASE ORAL DAILY
COMMUNITY
End: 2022-11-21 | Stop reason: SDUPTHER

## 2022-11-18 NOTE — TELEPHONE ENCOUNTER
Referral to [facility] dr Mary Manuel, first available march of 2023. Asked if could send records for review to see if pt can be seen sooner with dr Mary Manuel or other drs listed on referral.     Records faxed to [phone], they will review and contact pt. Pt informed    Pt wanted to make sure dr Tommy Lee aware life vest went off the other day. States sent a msg because life vest as never gone off. Any thing need to be done?

## 2022-11-18 NOTE — TELEPHONE ENCOUNTER
Pt states she was able to disarm the vest before it shocked her, she states her pulse was 122 and bp was 68-40. Pt sat on the floor and took deep breaths.

## 2022-11-18 NOTE — PROGRESS NOTES
Video Education Report - ICR/CR  Name:  Cesilia Galindo     Date:  11/18/2022  MRN: [id number]     Session #:  6  Session Length: 40 min    Core Videos        []Heart Disease Risk Reduction       []Overview of Pritikin Eating Plan      []Move it          []Calorie Density         []Healthy Minds, Bodies, Hearts        []Label Reading - Part 1       []Metabolic Syndrome and Belly Fat        []How Our Thoughts Can Heal Our Hearts   [x]Dining Out - Part 1      []Biomechanical Limitations  []Facts on Fat        []Hypertension & Heart Disease    []Diseases of Our Time - Focusing on Diabetes   []Body Composition  []Nutrition Action Plan    []Exercise Action Plan      Comments:  Video completed, group discussion Patient

## 2022-11-21 ENCOUNTER — APPOINTMENT (OUTPATIENT)
Dept: CARDIAC REHAB | Age: 52
End: 2022-11-21
Payer: COMMERCIAL

## 2022-11-21 ENCOUNTER — HOSPITAL ENCOUNTER (OUTPATIENT)
Dept: CARDIAC REHAB | Age: 52
Setting detail: THERAPIES SERIES
End: 2022-11-21
Payer: COMMERCIAL

## 2022-11-21 RX ORDER — METOPROLOL SUCCINATE 100 MG/1
100 TABLET, EXTENDED RELEASE ORAL DAILY
Qty: 90 TABLET | Refills: 1 | Status: SHIPPED | OUTPATIENT
Start: 2022-11-21

## 2022-11-21 NOTE — TELEPHONE ENCOUNTER
Call to [facility] scheduling, spoke to arabella to see if records received  States they are currently working on records from 11-17-22. Informed records were marked \"urgent\". He states then they will put those records first in line, should be working on today. Informed I will call back tmw to make sure records received.

## 2022-11-22 NOTE — TELEPHONE ENCOUNTER
Spoke to [facility] physician referral line, records received, pt scheduled 12-07-22    Conchis at echo dept will push echo and heart cath films to [facility]

## 2022-11-23 ENCOUNTER — HOSPITAL ENCOUNTER (OUTPATIENT)
Dept: CARDIAC REHAB | Age: 52
Setting detail: THERAPIES SERIES
Discharge: HOME | End: 2022-11-23
Payer: COMMERCIAL

## 2022-11-23 PROCEDURE — G0423 INTENS CARDIAC REHAB NO EXER: HCPCS

## 2022-11-23 PROCEDURE — G0422 INTENS CARDIAC REHAB W/EXERC: HCPCS

## 2022-11-23 NOTE — PROGRESS NOTES
Layla Stone YOB: 1970    Acct Number: [de-identified]   MRN:  [id number]                             [facility] HEALTHY MIND-SET WORKSHOP             Date: 2022        Session #________    Fredrick Alejandra class covered:    []  New Thoughts New Behaviors Workshop:  Patient will learn and practice techniques for developing effective health and lifestyle goals. Patient will be able to effectively apply the goal setting process learned to develop at least one new personal goal.     []  Managing Moods & Relationships Workshop:  Patient will learn how emotional and chronic stress factors can impact their hearts. They will learn healthy ways to handle stress and utilize positive coping mechanisms. In addition, [facility] patient will learn ways to improve communication skills. []  Healthy Sleep for a healthy Heart:  Patients will learn the importance of sleep to overall health, well-being, and quality of life. They will understand the symptoms of, and treatments for, common sleep disorders. Patients will also be able to identify daytime and nighttime behaviors which impact sleep, and they will be able to apply these tools to help manage sleep-related challenges. [x]  Recognizing and Reducing Stress:  Patients will learn about stress and how to recognize stress. Patients will gain insight into the toll that chronic stress takes on their health, both emotionally and physically. Patients will learn and practice a variety of stress management techniques. Patients will be able to effectively apply coping mechanisms in perceived stressful situations. Lisa actively participated and verbalized understanding. Total time in the Healthy Mind-Set class was 60 minutes.     Electronically signed by DESTINEE Stanford on 2022 at 4:06 PM

## 2022-11-25 ENCOUNTER — APPOINTMENT (OUTPATIENT)
Dept: CARDIAC REHAB | Age: 52
End: 2022-11-25
Payer: COMMERCIAL

## 2022-11-25 ENCOUNTER — HOSPITAL ENCOUNTER (OUTPATIENT)
Dept: CARDIAC REHAB | Age: 52
Setting detail: THERAPIES SERIES
End: 2022-11-25
Payer: COMMERCIAL

## 2022-11-28 ENCOUNTER — APPOINTMENT (OUTPATIENT)
Dept: CARDIAC REHAB | Age: 52
End: 2022-11-28
Payer: COMMERCIAL

## 2022-11-30 ENCOUNTER — HOSPITAL ENCOUNTER (OUTPATIENT)
Dept: CARDIAC REHAB | Age: 52
Setting detail: THERAPIES SERIES
Discharge: HOME | End: 2022-11-30
Payer: COMMERCIAL

## 2022-11-30 PROCEDURE — G0423 INTENS CARDIAC REHAB NO EXER: HCPCS

## 2022-11-30 PROCEDURE — G0422 INTENS CARDIAC REHAB W/EXERC: HCPCS

## 2022-11-30 NOTE — PROGRESS NOTES
Oneil RAMSEY.:  1970    Acct Number: [de-identified]   MRN:  [id number]                             [facility] NUTRITION 1:1 Counseling             Date: 2022       Session # _______   1:1 Counseling Session    GOALS:  Maintain the new healthy habits she has started. 2.   Learn new tools and ways to avoid Na+ and fatty, sugary foods by making more foods from scratch and more plant-based meals. Lisa reports making some changes since starting [facility] including quitting smoking and drinking and eating out. She is reading labels and especially avoiding high sodium foods and using \"Mrs. Dash\". She is eating more fruits and veggies than anything else. She reports that fries, hamburgers, kaye and sausage are no longer appealing to her. She may eat a small amount of lightly salted chips, but no more, and is contemplating making her own from scratch. We discussed using an air popper for popcorn and adding her own non-salt seasonings, making a plant-based burger-hao or loaf from scratch. She has not had much energy or much of an appetite since her hospitalization for CHF exacerbation and is typically eating only 1 meal a day. Pt was educated regarding nutrient needs and possible deficiencies to check for. She was encouraged to add at least one other healthy meal, like a smoothie made from scratch and to include a variety of \"go foods\" in her diet. In reviewing the [address] [place] [address], Lisa states she is contemplating the elimination of the meats altogether and plans to attend the cooking class for more ideas.     Reported Weight Trends:   Edema:   Lab Trends:   Rate Your Plate:     Receptiveness to education/goals:  (X) Agreeable ( ) No Interest   ( ) Refused  Evaluation of education:  ( ) Indicates understanding   ( ) Needs reinforcement     () Unsuccessful     Readiness to change:    ( ) Pre-contemplative   ( ) Contemplative - ambivalent about change    ( ) Action - ready to set action plan and implement   (X) Maintenance - has made change and is trying, and or practicing different alternative behaviors     Exercise Habits:  Lisa reports on days off she is up all night and sleeps most of the day. Food Recall:  Breakfast:  Skips  Lunch:  Skips  Dinner:  Salad, fruit, carrrots, celery (Is making meat loaf for tonight w/ turkey, eggs, oats and Mrs. Dash)  Snacks:    Main Beverages:  milk (fat free), Flavored water (Dewane Kamla), is working on [employer] coke--takes her two days to finish a bottle. Likes orange sherbert    Grocery Shopping:  Meal Prep/Cooking:  Dining Out:    Lisa was counseled by the Dietitian for 45 minutes. Motivational Interviewing was used to help promote change. Patient voiced understanding.      Electronically signed by Remigio Nicholson RD on 11/30/2022 at 4:00 PM

## 2022-12-01 PROCEDURE — G0423 INTENS CARDIAC REHAB NO EXER: HCPCS

## 2022-12-02 ENCOUNTER — HOSPITAL ENCOUNTER (OUTPATIENT)
Dept: CARDIAC REHAB | Age: 52
Setting detail: THERAPIES SERIES
Discharge: HOME | End: 2022-12-02
Payer: COMMERCIAL

## 2022-12-02 PROCEDURE — G0423 INTENS CARDIAC REHAB NO EXER: HCPCS

## 2022-12-02 PROCEDURE — G0422 INTENS CARDIAC REHAB W/EXERC: HCPCS

## 2022-12-02 NOTE — PROGRESS NOTES
Jasmyne Anaya YOB: 1970  Acct Number: [de-identified]  MRN:  [id number]                  [facility] COOKING SCHOOL WORKSHOP             Date: 2022        Session # ________   Deepti Goldmanmaker class covered:    [facility]    [x] Adding Flavor - Sodium-Free  [] Fast & Healthy Breakfasts    [] Powerhouse Plant-Based Proteins [] Satisfying Salads and Dressings    [] Simple Sides & Sauces   [] Personalizing Your Plate    [] Delicious Desserts    [] Savory Soups    [] Efficiency Cooking - Meals in a Snap [] Tasty Appetizers & Snacks     [] Comforting Weekend Breakfasts  [] One-Pot Wonders    [] Fast Evening Meals   [] Easy Entertaining    []  International [employer]isine Spotlight on the [employer]          Patients were shown how to choose, prep, and cook; substitutions and other options were given. Samples were offered. Recipes were given and questions answered. The patient above was in the [employer] for 45 minutes.       Electronically signed by Florida Orosco RD on 2022 at 3:30 PM

## 2022-12-05 ENCOUNTER — HOSPITAL ENCOUNTER (OUTPATIENT)
Dept: CARDIAC REHAB | Age: 52
Setting detail: THERAPIES SERIES
End: 2022-12-05
Payer: COMMERCIAL

## 2022-12-05 ENCOUNTER — APPOINTMENT (OUTPATIENT)
Dept: CARDIAC REHAB | Age: 52
End: 2022-12-05
Payer: COMMERCIAL

## 2022-12-07 ENCOUNTER — APPOINTMENT (OUTPATIENT)
Dept: CARDIAC REHAB | Age: 52
End: 2022-12-07
Payer: COMMERCIAL

## 2022-12-07 ENCOUNTER — HOSPITAL ENCOUNTER (OUTPATIENT)
Dept: CARDIAC REHAB | Age: 52
Setting detail: THERAPIES SERIES
End: 2022-12-07
Payer: COMMERCIAL

## 2022-12-09 ENCOUNTER — APPOINTMENT (OUTPATIENT)
Dept: CARDIAC REHAB | Age: 52
End: 2022-12-09
Payer: COMMERCIAL

## 2022-12-09 ENCOUNTER — HOSPITAL ENCOUNTER (OUTPATIENT)
Dept: CARDIAC REHAB | Age: 52
Setting detail: THERAPIES SERIES
End: 2022-12-09
Payer: COMMERCIAL

## 2022-12-12 ENCOUNTER — HOSPITAL ENCOUNTER (OUTPATIENT)
Dept: CARDIAC REHAB | Age: 52
Setting detail: THERAPIES SERIES
Discharge: HOME | End: 2022-12-12
Payer: COMMERCIAL

## 2022-12-12 PROCEDURE — G0423 INTENS CARDIAC REHAB NO EXER: HCPCS

## 2022-12-12 PROCEDURE — G0422 INTENS CARDIAC REHAB W/EXERC: HCPCS

## 2022-12-12 NOTE — PLAN OF CARE
[address] [place] [address] Facility-Based Program  Individualized Cardiac Treatment Plan     Patient Name:  Waqas Villagran  :  1970  Age:  47 y.o. MRN:  [id number]  Diagnosis: CHF  Date of Event: --   Physician: Amarjit Jamison  Next Office Visit:  22  Date Entered Program: 10/18/22  Risk Stratifications: [] Low [] Intermediate [x] High  Allergies: No Known Allergies     COVID -19 Screen  Do you have any of the following symptoms:  [] Fever [] Cough [] SOB [] Muscle/Body Ache [] Loss of taste/smell [x] None     Have you traveled outside of the [place]? [] Yes      [x] No     Have you been around anyone who has tested Positive for COVID 19?  [] Yes      [x] No     The following Education has been completed with patient. [x] Wait in the lobby until we call you back to rehab. [x] You must wear a mask while in the medical center, and in cardiac rehab. Please bring your own. [x] Bring your own bottle of water with you.         Individual Cardiac Treatment Plan -EXERCISE  INITIAL 30 DAY 60 DAY 90  DAY FINAL DAY   EXERCISE  ASSESSMENT/PLAN EXERCISE  REASSESSMENT EXERCISE   REASSESSMENT EXERCISE   REASSESSMENT EXERCISE   REASSESSMENT EXERCISE  DISCHARGE/FOLLOW-UP   Date: 10/18/22 Date: 22 Date: 22 Date: Date: Date:   Session #1  First Exercise Session:  10/24/22 Session # 18 Session # 30 Session # ** Session # ** Session # **  Last Exercise Session:  **   Stages of Change Stages of Change Stages of Change Stages of Change Stages of Change Stages of Change   [] Pre Contemplation  [x] Contemplation  [] Preparation  [] Action  [] Maintenance  [] Relapse [] Pre Contemplation  [x] Contemplation  [] Preparation  [] Action  [] Maintenance  [] Relapse [] Pre Contemplation  [] Contemplation  [x] Preparation  [] Action  [] Maintenance  [] Relapse [] Pre Contemplation  [] Contemplation  [] Preparation  [] Action  [] Maintenance  [] Relapse [] Pre Contemplation  [] Contemplation  [] Preparation  [] Action  [] Maintenance  [] Relapse [] Pre Contemplation  [] Contemplation  [] Preparation  [] Action  [] Maintenance  [] Relapse   EXERCISE ASSESSMENT EXERCISE ASSESSMENT EXERCISE ASSESSMENT EXERCISE ASSESSMENT EXERCISE ASSESSMENT EXERCISE ASSESSMENT   6 Min Walk Test  Distance walked:   0.22 miles  1161 ft.  2.6 METs  Max HR:121 BPM      RPE:  13    THR:  129-142  Rhythm:  Sinus tach          6 Min Walk Test  Distance walked:   ** miles  ** ft  ** METs  Max HR:** BPM      RPE:  **  %Change ft= **    Rhythm:  **   DASI: 5.7 METs DASI: 7. 0METs DASI: 7. 0METs DASI: ** METs DASI: ** METs DASI: ** METs   Return to Work  Lisa plans on returning to work? [x] Yes  - pt might return to work if her echo shows improvements          [] No   [] Disabled     [] Retired       Return to work: Has Lisa returned to work? [] Yes    [x] No     Return to work date set? [] Yes,  [x] No       Return to work: Has Lisa returned to work? [] Yes    [x] No     Return to work date set? [] Yes, **    [x] No    Return to work: Has Lisa returned to work? [] Yes    [] No     Return to work date set? [] Yes, **    [] No     Lisa is doing ** at work. Return to work: Has Lisa returned to work? [] Yes    [] No     Return to work date set? [] Yes, **    [] No     Lisa is doing ** at work. Return to work: Has Lisa returned to work? [] Yes    [] No     Return to work? [] Yes, **    [] No     *Required MET Level achieved for job duties?    [] Yes    [] No   Orthopedic Limitations/  [] Yes    [x] No          Orthopedic Limitations    Orthopedic Limitations    Orthopedic Limitations    Orthopedic Limitations Orthopedic Limitations      Fall Risk     []Assessed as a fall risk  [x] Not a fall risk      [] Balance Issues       [] Terry Brown        [] Rehana Levy       [] Wheelchair  Actions needed:  NA     [x] Safety issues reviewed        Fall Risk    Fall Risk Fall Risk Fall Risk Fall Risk   Home Exercise  [x] Yes    [] Ergometer (AE)  [] NuStep  [] Elliptical (E) MODE:    [] Treadmill (TM)  [] Schwinn Airdyne (AD)  [] Arms Ergometer (AE)  [] NuStep  [] Elliptical (E)   Initial Workloads  TM: [email or web address] 2.8 METs  AD: 0.7 level = 2.6 METs  NS: 50  Galeana= 2.6 METs  AE: 0.7 level = 2.6 METs Current Workloads  TM: 2.6 @ 0%= 3.0 METs  AD:  0.8level = 2.8 METs  AE:  0.4level = 1.9 METs Current Workloads  TM:  2.8@ 1%= 3.6 METs  AD: 0.8 level =  2.8METs  AE: 0.6 level =  2.3METs Current Workloads  TM:  @ %=  METs  AD:  level =  METs  NS:   Galeana=  METs  AE:  level =  METs Current Workloads  TM:  @ %=  METs  AD:  level =  METs  NS:   Galeana=  METs  AE:  level =  METs Current Workloads  TM:  @ %=  METs  AD:  level =  METs  NS:   Galeana=  METs  AE:  level =  METs      Frequency:    ICR: 3x/week  Home: 2-3x/wk Frequency:   ICR: 3x/week  Home: 3x/wk Frequency:  ICR: 3x/week  Home: 3-4x/wk Frequency:  ICR: 3x/week  Home: 3-4x/wk Frequency:  ICR: 3x/week  Home: 3-4x/wk Frequency:  Lisa will continue exercise at  5-7 days/week   Duration:   Total aerobic exercise = 30-45 min     5-8 min/mode Duration:  Total aerobic exercises = 32 min      12-15min/mode Duration:  Total aerobic exercises = 35min      15min/mode Duration:  Total aerobic exercises = ** min      **min/mode Duration:  Total aerobic exercises = ** min      **min/mode Duration:  Total erobic exercise =  60-90 min   Intensity:   MET Level =2.8  RPE = 12-15 Intensity:  Max MET Level = 3.0  RPE = 12-15 Intensity:  Max MET Level = 3.6  RPE = 12-15 Intensity:  Max MET Level = **  RPE = 12-15 Intensity:  Max MET Level = **  RPE = 12-15 Intensity:  Max MET Level = ** RPE = 12-15   Progression: increase aerobic activity up to 15 min over next 4 weeks by increasing time 2-3 min/week.  Progression:  Increase exercise intensity by 5-10% over the next 4 weeks Progression:   Increase exercise intensity by 5-10% over the next 4 weeks Progression: Progression: Progression:  Increase time/intensity when RPE <13, and HR is in [facility]   [x] Yes      [] No  Upper and Lower body strength training 2x/wk     Wt: 5#       Reps:  8-15     *Increase wt. after completing 15 reps with an RPE of <12/13. [x] Yes      [] No  Upper and Lower body strength training 2x/wk     Wt: 5#       Reps:  8-15     *Increase wt. after completing 15 reps with an RPE of <12/13. [x] Yes      [] No  Upper and Lower body strength training 2x/wk     Wt: 5#       Reps:  8-15     *Increase wt. after completing 15 reps with an RPE of <12/13. [] Yes      [] No  Upper and Lower body strength training 2x/wk     Wt: **#       Reps:  8-15     *Increase wt. after completing 15 reps with an RPE of <12/13. [] Yes      [] No  Upper and Lower body strength training 2x/wk     Wt: **#       Reps:  8-15     *Increase wt. after completing 15 reps with an RPE of <12/13. Continue Strength Training at home   [] Exercise Log & Strength training handout given     Wt: **#       Reps:  8-15     *Increase wt. after completing 15 reps with an RPE of <12/13. Flexibility Flexibility Flexibility Flexibility Flexibility Flexibility   [x] Yes      [] No  25 min session of Core Strength & Flexibility 1x/per week  Attends Core Strength & Flexibility   [x] Yes      [] No Attends Core Strength & Flexibility   [x] Yes      [] No Attends Core Strength & Flexibility   [] Yes      [] No Attends Core Strength & Flexibility   [] Yes      [] No Continue Core Strength & Flexibility at home   Home Exercise  *Lisa verbalizes planning to walk 2 days/week for 30 min on days not in rehab. Home Exercise  *Lisa verbalizes planning to walk 4days/week for 30 min on days not in rehab. Home Exercise  *Lisa in encouraged to start cardiovascular exercise on days she is not in rehab Home Exercise  *Lisa verbalizes planning to ** ** days/week for ** min on days not in rehab.  Home Exercise  *Lisa verbalizes planning to ** ** days/week for ** min on days not in rehab. Home Exercise  *Lisa verbalizes his/her plan to ** ** days/week for ** min @ **   *Education* *Education* *Education* *Education* *Education* *Education*   RPE Scale  [x] Yes      [] No  Exercise Safety  [x] Yes      [] No  Equipment Orientation  [x] Yes      [] No  S/S to Report  [x] Yes      [] No  Warm Up/Cool Down  [x] Yes      [] No  Home Exercise  [x] Yes      [] No         All Exercise Education Completed  [] Yes      [] No   Exercise Education Recommended     Workshops  [x] Benefits of Exercise  [x] Balance Training & Fall Prevention  [x] Heart Disease Risk Reduction  [x] Yoga & Heart Health Exercise Education Attended/Date       Exercise Education Attended/Date Exercise Education Attended/Date Exercise Education Attended/Date All Sessions Completed? [] Yes  [] No   *Goals* *Goals* *Goals* *Goals* *Goals* *Goals*   Initial Exercise Goals Exercise Goals  Exercise Goals   Exercise Goals  Exercise Goals  Exercise Goals   Lisa plans to:  [x] Attend exercise sessions 3x/wk  [x] initiate home exercise 2-3x/wk for 10-20 min  [x] Increase 6 min walk distance by 10%  [x] Attend Exercise workshops Lisa plans to:  [x] Attend exercise sessions 3x/wk  [x] continue home exercise 2-3x/wk for 20-30 min  [x] Attend Exercise workshops Lisa's plans to:  [x] Attend exercise sessions 3x/wk  [x] continue home exercise 3-4x/wk for 30-45 min  [x] Determine plan of exercise following rehab  [x] Attend Exercise workshops Lisa's plans to:  [x] Attend exercise sessions 3x/wk  [x] continue home exercise 3-4x/wk for 30-45 min  [x] Determine plan of exercise following rehab  [x] Attend Exercise workshops Lisa's plans to:  [x] Attend exercise sessions 3x/wk  [x] continue home exercise 3-4x/wk for 30-45 min  [x] Determine plan of exercise following rehab  [x] Attend Exercise workshops Lisa achieved exercise goals?     []  Yes    [] No  If no, why?  **  [] Increased 6 min walk distance by 10%  [] Currently exercising 30-60 min/day, 5-7days/wk   [] Plans to continue exercise on own  [] Plans to join a local fitness center to continue exercise  [] Does not plan to continue to exercise after rehab   Mutually agreed upon goals:  Lisa would like to build strength and endurance, and be able to walk a 5k again. Progress towards mutually agreed upon goals:  Lisa  is making progress Progress towards mutually agreed upon goals:  Lisa  is slowly making progress Progress towards mutually agreed upon goals:  Lisa ** Progress towards mutually agreed upon goals:  Lisa  ** Progress towards mutually agreed upon goals:  Lisa  **    *MET level required for above goal:  4.6 METs MET level Achieved:  3. 0METs MET level Achieved:  3. 6METs MET level Achieved:  **METs MET level Achieved:  **METs MET level Achieved:  **METs      Individual Cardiac Treatment Plan - Nutrition  NUTRITION  ASSESSMENT/PLAN NUTRITION  REASSESSMENT NUTRITION   REASSESSMENT NUTRITION   REASSESSMENT NUTRITION  DISCHARGE/FOLLOW-UP NUTRITION  DISCHARGE/FOLLOW-UP   Stages of Change Stages of Change Stages of Change Stages of Change Stages of Change Stages of Change   [] Pre Contemplation  [x] Contemplation  [] Preparation  [] Action  [] Maintenance  [] Relapse [] Pre Contemplation  [x] Contemplation  [] Preparation  [] Action  [] Maintenance  [] Relapse [] Pre Contemplation  [] Contemplation  [x] Preparation  [] Action  [] Maintenance  [] Relapse [] Pre Contemplation  [] Contemplation  [] Preparation  [] Action  [] Maintenance  [] Relapse [] Pre Contemplation  [] Contemplation  [] Preparation  [] Action  [] Maintenance  [] Relapse [] Pre Contemplation  [] Contemplation  [] Preparation  [] Action  [] Maintenance  [] Relapse   NUTRITION ASSESSMENT NUTRITION ASSESSMENT NUTRITION ASSESSMENT NUTRITION ASSESSMENT NUTRITION ASSESSMENT NUTRITION ASSESSMENT   Weight Management  Weight: 144        Height: 5'2\"   BMI: 26.3  Weight Management  Weight: 144.2                  Weight Management  Weight: 147.2                   Weight Management  Weight: ** Weight Management  Weight: ** Weight Management  Weight: **                    BMI: **   Eating Plan  Current eating habits: \"does not have much of an appetite, watching sodium and fluid\" Eating Plan  Changes: \"unknown\"  Eating Plan  Changes: none reported Eating Plan  Changes: Eating Plan  Changes: Eating Plan Improvements:   Alcohol Use  [x] none          [] daily  [] weekly      [] special              Diet Assessment Tool:  RATE YOUR PLATE  *Given to patient to complete and return. Diet Assessment Tool:    Score: 54/72            Diet Assessment Tool: RATE YOUR PLATE  Score: **/89   NUTRITION PLAN NUTRITION PLAN NUTRITION PLAN NUTRITION PLAN NUTRITION PLAN NUTRITION PLAN   *Interventions* *Interventions* *Interventions* *Interventions* *Interventions* *Interventions*   Initial Survey given Goal Setting Discussion:   [x] Yes      [] No             Follow Up Survey Reviewed & Goals Updated:      Professional Referral  Please check if needed. [] Dietitian Consult   [] Wt. Management Referral  [] Other:  Professional Referral  Please check if needed. [] Dietitian Consult   [] Wt. Management Referral  [] Other: Professional Referral  Please check if needed. [] Dietitian Consult   [] Wt. Management Referral  [] Other: Professional Referral  Please check if needed. [] Dietitian Consult   [] Wt. Management Referral  [] Other: Professional Referral  Please check if needed. [] Dietitian Consult   [] Wt. Management Referral  [] Other: Professional Referral  Please check if needed. [] Dietitian Consult   [] Wt.  Management Referral  [] Other:   *Education* *Education* *Education* *Education* *Education* *Education*   Nutritional Education Recommended     [x] 1:1 Registered Dietitian     Workshops  [x] Label Reading   [x] Menu  [x] Targeting Nutrition Priorities  [x] Mindful Eating    Nutritional Education Attended/Date Nutritional Education Attended/Date Nutritional Education Attended/Date Nutritional Education Attended/Date All Sessions Completed? [] Yes  [] No   Cooking School  Recommended      [x] Adding Flavor  [x] Fast & Healthy     Breakfasts  [x] Salads & Dressings  [x] Savory Soups  [x] Simple Sides & Sauces  [x] Appetizers &     Snacks  [x] Delicious Desserts  [x] Plant-Based Proteins  [x] Fast Evening Meals  [x] Weekend Breakfasts  [x] Efficiency Cooking  [x] One-Pot [employer]  Sessions Completed    [facility] [employer]  Sessions Completed    Adding flavor 12/2/22 [employer]ing School  Sessions Completed       [facility] [employer]  Sessions Completed [employer]ing School     # of sessions completed:  **   *Goals* *Goals* *Goals* *Goals* *Goals* *Goals*   Lisa's nutritional goals are as follows:  Complete and return diet survey Lisa's nutritional goals are as follows:  [x] Attend Nutrition Workshops  [x] Attend 1:1   [x] Attend Cooking Classes    Lisa's nutritional goals are as follows:  [x] Attend Nutrition Workshops  [] Attend 1:1   [x] Attend Cooking Classes  [x] Complete and return diet survey   Lisa's nutritional goals are as follows:  [] Attend Nutrition Workshops  [] Attend 1:1   [] Attend Cooking Classes  [] ** Lisa's nutritional goals are as follows:  [] Attend Nutrition Workshops  [] Attend 1:1   [] Attend Cooking Classes  [] ** Lisa achieved nutritional goals   [] Yes    [] No  If no, why?   Use knowledge gained to continue Pritikin eating plan at home         Individual Cardiac Treatment Plan - Psychosocial  PSYCHOSOCIAL  ASSESSMENT/PLAN PSYCHOSOCIAL  REASSESSMENT PSYCHOSOCIAL   REASSESSMENT PSYCHOSOCIAL   REASSESSMENT PSYCHOSOCIAL  DISCHARGE/FOLLOW-UP PSYCHOSOCIAL  DISCHARGE/FOLLOW-UP   Stages of Change Stages of Change Stages of Change Stages of Change Stages of Change Stages of Change   [] Pre Contemplation  [] Contemplation  [x] Preparation  [] Action  [] Maintenance  [] Relapse [] Pre Contemplation  [] Contemplation  [x] Preparation  [] Action  [] Maintenance  [] Relapse [] Pre Contemplation  [] Contemplation  [x] Preparation  [] Action  [] Maintenance  [] Relapse [] Pre Contemplation  [] Contemplation  [] Preparation  [] Action  [] Maintenance  [] Relapse [] Pre Contemplation  [] Contemplation  [] Preparation  [] Action  [] Maintenance  [] Relapse [] Pre Contemplation  [] Contemplation  [] Preparation  [] Action  [] Maintenance  [] Relapse   PSYCHOSOCIAL ASSESSMENT PSYCHOSOCIAL ASSESSMENT PSYCHOSOCIAL ASSESSMENT PSYCHOSOCIAL ASSESSMENT PSYCHOSOCIAL ASSESSMENT PSYCHOSOCIAL ASSESSMENT   Behavioral Outcomes Behavioral Outcomes Behavioral Outcomes Behavioral Outcomes Behavioral Outcomes Behavioral Outcomes   PHQ-9 score 19  Depression Severity  []Minimal  []Mild   []Moderate  [x]Moderately Severe  []Severe       PHQ-9 score **  Depression Severity  []Minimal  []Mild   []Moderate  []Moderately Severe []Severe PHQ-9 score **  Depression Severity  []Minimal  []Mild   []Moderate  []Moderately Severe []Severe   Does patient have Family Support? [x] Yes      [] No  No signs of marital/family distress             Within the Past Month:  *Have you wished you were dead or wished you could go to sleep and not wake up? [] Yes      [x] No  *Have you had any thoughts of killing yourself? [] Yes      [x] No                Using a scale of 0-10, 0=none, 10=very:   Rate your depression: 8  Rate your anxiety:  8  Using a scale of 0-10, 0=none, 10=very:   Rate your depression: 8  Rate your anxiety:  9 Using a scale of 0-10, 0=none, 10=very:   Rate your depression: 4  Rate your anxiety:  4 Using a scale of 0-10, 0=none, 10=very:   Rate your depression: **  Rate your anxiety:  ** Using a scale of 0-10, 0=none, 10=very:   Rate your depression: **  Rate your anxiety:  ** Using a scale of 0-10, 0=none, 10=very:   Rate your depression: **  Rate your anxiety:  **   Signs and Symptoms of Depression Present?     [x] Yes      [] No  If yes, please explain:  pt reports that her heart failure was a surprise, doctors think it is viral. She has had to completely change her everyday life. No longer working or able to do the things she used to. Her  tries to be supportive but she states that it isn't the type of support she needs. She doesn't feel confident in her ability to do every day activities. Signs and Symptoms of Depression Present? [x] Yes      [] No  If yes, please explain:  see previous. She is depressed about all her life change she has had to make ( not being able to go out with friends, eat what she wants, work) Signs and Symptoms of Depression Present? [x] Yes      [] No  If yes, please explain:  pt reported today that her anxiety and depression is much better Signs and Symptoms of Depression Present? [] Yes      [] No  If yes, please explain:  ** Signs and Symptoms of Depression Present? [] Yes      [] No  If yes, please explain:  ** Signs and Symptoms of Depression Present? [] Yes      [] No  If yes, please explain:  **   Signs and Symptoms of Anxiety Present? [x] Yes      [] No  If yes, please explain:  see above  Signs and Symptoms of Anxiety Present? [x] Yes      [] No  If yes, please explain:  see previous Signs and Symptoms of Anxiety Present? [x] Yes      [] No  If yes, please explain:  see above Signs and Symptoms of Anxiety Present? [] Yes      [] No  If yes, please explain:  ** Signs and Symptoms of Anxiety Present? [] Yes      [] No  If yes, please explain:  ** Signs and Symptoms of Anxiety Present? [] Yes      [] No  If yes, please explain:  **   [] Patient has poor eye contact   [] Flat affect present. [] Signs of anxiety, anger or hostility    [] Signs social isolation present.    []  Signs of alcohol or substance abuse             PSYCHOSOCIAL PLAN PSYCHOSOCIAL PLAN PSYCHOSOCIAL PLAN PSYCHOSOCIAL PLAN PSYCHOSOCIAL PLAN PSYCHOSOCIAL PLAN   *Interventions* *Interventions* *Interventions* *Interventions* *Interventions* *Interventions*   *Please check if needed  [] Psych Consult  [] Physician Referral  [x] Stress Management Skills *Please check if needed  [] Psych Consult  [] Physician Referral  [x] Stress Management Skills *Please check if needed  [] Psych Consult  [] Physician Referral  [x] Stress Management Skills *Please check if needed  [] Psych Consult  [] Physician Referral  [] Stress Management Skills *Please check if needed  [] Psych Consult  [] Physician Referral  [] Stress Management Skills *Please check if needed  [] Psych Consult  [] Physician Referral  [] Stress Management Skills   Is patient currently taking anti-depressant or anti-anxiety medications? [x] Yes      [] No  If yes, please list medications:  EFFEXOR Change in anti-depressant or anti-anxiety medications? [] Yes      [x] No    Change in anti-depressant or anti-anxiety medications? [] Yes      [x] No   Change in anti-depressant or anti-anxiety medications? [] Yes      [] No  If yes, please list medications:  ** Change in anti-depressant or anti-anxiety medications? [] Yes      [] No  If yes, please list medications:  ** Change in anti-depressant or anti-anxiety medications?   [] Yes      [] No  If yes, please list medications:  **   *Education* *Education* *Education* *Education* *Education* *Education*   Healthy Mind-Set Workshops Recommended  [x] Stress & Health  [x] Taking Charge of Stress  [x] New Thoughts, New Behaviors  [x] Managing Moods & Relationships Healthy Mind-Set Workshops Attended/Date     11/2/22 manage moods  Healthy Mind-Set Workshops Attended/Date    Stress and health 11/23/22 Healthy Mind-Set Workshops Attended/Date Healthy Mind-Set Workshops  Completed  [] Yes      [] No Healthy Mind-Set Workshops  Completed  [] Yes      [] No   *Goals* *Goals* *Goals* *Goals* *Goals* *Goals*   [place] psychosocial goals are as follows:  Complete HADS & Abilio & Viral, Quality of Life Index, Cardiac Version IV [place] psychosocial goals are as follows:  [x] Attend Healthy Mind-Set Workshops  [x] Reduce depression symptom severity by 1 level    Lisa's psychosocial goals are as follows:  [x] Attend Healthy Mind-Set Workshops  [x] Reduce depression symptom severity by 1 level   Lisa's psychosocial goals are as follows:  [] Attend Healthy Mind-Set Workshops  [] Reduce depression symptom severity by 1 level  [] ** Lisa achieved psychosocial goals? [] Yes    [] No  If no, why?  **  [] Use methods learned to continue to reduce depression symptom severity by 1 level  [] ** Lisa achieved psychosocial goals?   [] Yes    [] No  If no, why?  **  [] Use methods learned to continue to reduce depression symptom severity by 1 level  [] **      Individual Cardiac Treatment Plan - Other:  Risk Factor/Education  RISK FACTOR  ASSESSMENT/PLAN RISK FACTOR  REASSESSMENT  RISK FACTOR  REASSESSMENT RISK FACTOR  REASSESSMENT RISK FACTOR   DISCHARGE/FOLLOW-UP RISK FACTOR   DISCHARGE/FOLLOW-UP   Stages of Change Stages of Change Stages of Change Stages of Change Stages of Change Stages of Change   [] Pre Contemplation  [x] Contemplation  [] Preparation  [] Action  [] Maintenance  [] Relapse [] Pre Contemplation  [x] Contemplation  [] Preparation  [] Action  [] Maintenance  [] Relapse [] Pre Contemplation  [x] Contemplation  [] Preparation  [] Action  [] Maintenance  [] Relapse [] Pre Contemplation  [] Contemplation  [] Preparation  [] Action  [] Maintenance  [] Relapse [] Pre Contemplation  [] Contemplation  [] Preparation  [] Action  [] Maintenance  [] Relapse [] Pre Contemplation  [] Contemplation  [] Preparation  [] Action  [] Maintenance  [] Relapse   RISK FACTOR/EDUCATION ASSESSMENT RISK FACTOR/EDUCATION ASSESSMENT RISK FACTOR/EDUCATION ASSESSMENT RISK FACTOR/EDUCATION ASSESSMENT RISK FACTOR /EDUCATION ASSESSMENT RISK FACTOR /EDUCATION ASSESSMENT   Hypertension  [] Yes      [x] No    Resting BP: 88/66  Peak Ex BP:94/66  Medication: Juanita shoemaker Hypertension  Resting BP: 100/66  Peak Ex BP:104/62  Medication Changes:  [] Yes      [x] No Hypertension  Resting BP: 90/64  Peak Ex /60  Medication Changes:  [] Yes      [x] No Hypertension  Resting BP: **  Peak Ex BP:**  Medication Changes:  [] Yes      [] No Hypertension  Resting BP: **  Peak Ex BP:**  Medication Changes:  [] Yes      [] No Hypertension  Resting BP: **  Peak Ex BP:**  Medication Changes:  [] Yes      [] No   Lipids  HLD/DLD  [] Yes      [x] No  TOTAL CHOL: 206  HDL:  62  LDL:  121  TRI  Medication: na Lipids  Medication Changes:  [] Yes      [x] No       Lipids  Medication Changes:  [] Yes      [x] No       Lipids  Medication Changes:  [] Yes      [] No       Lipids     TOTAL CHOL: **  HDL:  **  LDL:  **  TRIG:  **  Medication Changes:  [] Yes      [] No Lipids     TOTAL CHOL: **  HDL:  **  LDL:  **  TRIG:  **  Medication Changes:  [] Yes      [] No   Diabetes  [] Yes      [x] No  FBS: 91           HbA1C: na        Monitor BS @ home:   [] Yes      [x] No    Diabetes  na    Diabetes  NA       Diabetes  Most Recent BS:  BS have been in range  [] Yes      [] No  Medication Changes  [] Yes      [] No       Diabetes  Most Recent BS:  BS have been in range  [] Yes      [] No  Medication Changes  [] Yes      [] No Diabetes  Most Recent BS:  BS have been in range  [] Yes      [] No  Medication Changes  [] Yes      [] No         Tobacco Use  [] Current  [x] Former  [] Never     Date Quit: 2022    Tobacco Use  Change in smoking status   [] Yes      [x] No          Tobacco Use  Change in smoking status   [] Yes      [x] No         Tobacco Use  Change in smoking status   [] Yes      [] No     Quit date: ** Tobacco Use  Change in smoking status   [] Yes      [] No     Quit date: ** Tobacco Use  Change in smoking status   [] Yes      [] No     Quit date: **                    Learning Barriers  Please select one:  [] Speech  [] Literacy  [] Hearing  [] Cognitive  [] Vision  [x] Ready to Learn Learning Barriers Addressed:   [x] Yes      [] No    Learning Barriers Addressed:   [x] Yes      [] No    Learning Barriers Addressed:  [] Yes      [] No Learning Barriers Addressed:  [] Yes      [] No Learning Barriers Addressed:  [] Yes      [] No      RISK FACTOR/EDUCATION PLAN RISK FACTOR/EDUCATION PLAN RISK FACTOR/EDUCATION PLAN RISK FACTOR/EDUCATION PLAN RISK FACTOR/EDUCATION PLAN RISK FACTOR/EDUCATION PLAN   *Interventions* *Interventions* *Interventions* *Interventions* *Interventions* *Interventions*   Recommended Educational Videos     [x] Overview of The Pritikin Eating Plan  [x] Heart Disease Risk Reduction  [x] Move It! [x] Calorie Density  [x] Healthy Minds, Bodies, Hearts  [x] Label Reading  [x] Metabolic Syndrome & Belly   Or  [] How Our Thoughts Can Heal our Heart  [x] Dining Out-Part 1  [x] Biomechanical Limitations  [x] Facts on Fat  [x] Hypertension & Heart Disease  [x] Diseases of Our Times-Focusing on Diabetes  [x] Body Composition  [x] Nurtition Action Plan  [x] Exercise Action Plan    Completed Videos/Date        10/18/22  Overview of The Pritikin Eating Plan     Heart disease risk reduction 10/24/22     Move it 10/31/22     Calorie density 11/9/22     Label reading 1 11/14/22     Facts on fat 11/11/22     Planning eating start 10/26/22     Becoming  10/28/22 Completed Videos/Date    Healthy mind body heart 12/12/22    How your thoughts 11/16/22  Dining out 11/18/22    Healthy mind body heart 12/12/22 Completed Videos/Date Completed Videos/Date Recommended Educational Videos Completed     [] Yes      [] No     **If not completed, Why? **                 Smoking Cessation/Relaspe Prevention Intervention needed? [x] Yes      [] No  *Pt verbalizes and agrees to attend intervention Smoking Cessation/Relapse Prevention Scheduled? NA  Smoking Cessation/Relapse Prevention completed? [] Yes      [] No  Date: ** Smoking Cessation/Relapse Prevention completed?    [] Yes      [] No  Date: ** Smoking Cessation Counseling attended  [] Yes      [] No  **If not completed, Why? **   Professional Referrals:  *Please check if needed  [] Diabetes Clinic  [] Lipid Clinic   [] Other:          Professional Referrals:  *Please check if needed  [] Diabetes Clinic  [] Lipid Clinic   [] Other:   Preventative Medication Preventative Medication Preventative Medication Preventative Medication Preventative Medication Preventative Medication   Aspirin  [x] Yes    [] No  Blood Thinner: Clopidogrel/Effient/Brillinta  [] Yes    [x] No  Beta Blocker  [x] Yes    [] No  Ace Inhibitor  [] Yes    [x] No  Statin/Lipid Lowering  [] Yes    [x] No Medication Changes? [] Yes    [x] No Medication Changes? [] Yes    [x] No Medication Changes? [] Yes    [] No Medication Changes? [] Yes    [] No Medication Changes? [] Yes    [] No   *Education* *Education* *Education* *Education* *Education* *Education*   Does Lisa require any additional education? [] Yes    [x] No    Does Lisa require any additional education? [] Yes    [x] No Does Lisa require any additional education? [] Yes    [x] No Does Lisa require any additional education? [] Yes    [] No Does Lisa require any additional education? [] Yes    [] No Does Lisa require any additional education?   [] Yes    [] No   Additional Educational Videos     Exercise  [] Improve Performance     Medical  [] Aging Enhancing Your QoL  [] Biology of Weight Control  [] Decoding Lab Results  [] Diseases of Our Time - Overview  [] Sleep Disorders     Nutrition  [] Dining Out - Part 2  [] Fueling a Healthy Body  [] Menu Workshop  [] Planning Your Eating Strategy  [] Targeting Your Nutrition Priorities  [] Vitamins & Minerals     Healthy Mind-Set  [] Smoking Cessation     Culinary  []Becoming a Pritikin    [] Cooking - Breakfast & Snacks  [] Cooking -Healhty Salads & Dressing  [] Cooking -Dinner & Sides  [] Cooking -Soups & Desserts     Overview  [] The Pritikin Solution Additional Educational Videos Completed Additional Educational Videos Completed Additional Educational Videos Completed Additional Educational Videos Completed Additional Educational Videos Completed     [] Yes    [] No   *Goals* *Goals* *Goals* *Goals* *Goals* *Goals*   Lisa's risk factor/education goals are as follows:     [x] Optimal BP <140/90  [] Blood Sugar <120  [x] Attend recommended video education sessions  [x] Takes medications as prescribed 100% of the time     Lisa's risk factor/education goals are as follows:     [x] Optimal BP <140/90  [] Blood Sugar <120  [x] Attend recommended video education sessions  [x] Takes medications as prescribed 100% of the time     Lisa's risk factor/education goals are as follows:     [x] Optimal BP <140/90  [] Blood Sugar <120  [x] Attend recommended video education sessions  [x] Takes medications as prescribed 100% of the time   [] ** Lisa's risk factor/education goals are as follows:     [x] Optimal BP <140/90  [] Blood Sugar <120  [x] Attend recommended video education sessions  [x] Takes medications as prescribed 100% of the time   [] ** Lisa's risk factor/education goals are as follows:     [x] Optimal BP <140/90  [] Blood Sugar <120  [x] Attend recommended video education sessions  [x] Takes medications as prescribed 100% of the time   [] ** Lisa achieved risk factor goals?   [] Yes    [] No  If no, why?  **      Monitored telemetry has revealed sinus tach     Monitored telemetry has revealed NSR Monitored telemetry has revealed sinus tach    Monitored telemetry has revealed **  [] documented arrhythmia at increasing workloads  [] associated symptoms ** Monitored telemetry has revealed **  [] documented arrhythmia at increasing workloads  [] associated symptoms ** Monitored telemetry has revealed **  [] documented arrhythmia at increasing workloads  [] associated symptoms **   Physician Response     [x] Cardiac rehab is reasonably and medically necessary for continuous cardiac monitoring surveillance  of patient's cardiac activity  [x] Initiate continuous telemetry monitoring and notify me with any concerns  [] Other    Physician Response     [x] Cardiac rehab is reasonably and medically necessary for continuous cardiac monitoring surveillance  of patient's cardiac activity  [x] Continue continuous telemetry monitoring and notify me with any concerns  [] Other       Physician Response     [x] Cardiac rehab is reasonably and medically necessary for continuous cardiac monitoring surveillance  of patient's cardiac activity  [x] Continue continuous telemetry monitoring and notify me with any concerns   [] Other       Physician Response     [x] Cardiac rehab is reasonably and medically necessary for continuous cardiac monitoring surveillance  of patient's cardiac activity  [x] Continue continuous telemetry monitoring and notify me with any concerns   [] Other       Physician Response     [x] Cardiac rehab is reasonably and medically necessary for continuous cardiac monitoring surveillance  of patient's cardiac activity  [x] Continue continuous telemetry monitoring and notify me with any concerns   [] Other        Cosigned by: Lyndsey Jacobs MD at 10/19/2022 11:56 AM      Revision History  Date/Time User Provider Type Action   10/19/2022 11:56 AM Lyndsey Jacobs MD Physician Cosign   10/18/2022  1:46 PM Rayne Levin Exercise Physiologist Sign                          Plan of Care Info    Author Note Status Last Update User Last Update Date/Time   Rayne Levin Incomplete Rayne Levin 11/14/2022  1:54 PM

## 2022-12-12 NOTE — PROGRESS NOTES
Video Education Report - ICR/CR  Name:  Sandhya Lau     Date:  12/12/2022  MRN: [id number]     Session #:  13  Session Length: 40 min    Core Videos        []Heart Disease Risk Reduction       []Overview of Pritikin Eating Plan      []Move it          []Calorie Density         [x]Healthy Minds, Bodies, Hearts        []Label Reading - Part 1       []Metabolic Syndrome and Belly Fat        []How Our Thoughts Can Heal Our Hearts   []Dining Out - Part 1      []Biomechanical Limitations  []Facts on Fat        []Hypertension & Heart Disease    []Diseases of Our Time - Focusing on Diabetes   []Body Composition  []Nutrition Action Plan    []Exercise Action Plan        Comments:  Video completed, group discussion. Education binder given.

## 2022-12-12 NOTE — PLAN OF CARE
[address] [place] [address] Facility-Based Program  Individualized Cardiac Treatment Plan     Patient Name:  Milly Jara  :  1970  Age:  47 y.o. MRN:  [id number]  Diagnosis: CHF  Date of Event: --   Physician: Ledy Trivedi  Next Office Visit:  22  Date Entered Program: 10/18/22  Risk Stratifications: [] Low [] Intermediate [x] High  Allergies: No Known Allergies     COVID -19 Screen  Do you have any of the following symptoms:  [] Fever [] Cough [] SOB [] Muscle/Body Ache [] Loss of taste/smell [x] None     Have you traveled outside of the [place]? [] Yes      [x] No     Have you been around anyone who has tested Positive for COVID 19?  [] Yes      [x] No     The following Education has been completed with patient. [x] Wait in the lobby until we call you back to rehab. [x] You must wear a mask while in the medical center, and in cardiac rehab. Please bring your own. [x] Bring your own bottle of water with you.         Individual Cardiac Treatment Plan -EXERCISE  INITIAL 30 DAY 60 DAY 90  DAY FINAL DAY   EXERCISE  ASSESSMENT/PLAN EXERCISE  REASSESSMENT EXERCISE   REASSESSMENT EXERCISE   REASSESSMENT EXERCISE   REASSESSMENT EXERCISE  DISCHARGE/FOLLOW-UP   Date: 10/18/22 Date: 22 Date: Date: Date: Date:   Session #1  First Exercise Session:  10/24/22 Session # 25 Session # ** Session # ** Session # ** Session # **  Last Exercise Session:  **   Stages of Change Stages of Change Stages of Change Stages of Change Stages of Change Stages of Change   [] Pre Contemplation  [x] Contemplation  [] Preparation  [] Action  [] Maintenance  [] Relapse [] Pre Contemplation  [x] Contemplation  [] Preparation  [] Action  [] Maintenance  [] Relapse [] Pre Contemplation  [] Contemplation  [] Preparation  [] Action  [] Maintenance  [] Relapse [] Pre Contemplation  [] Contemplation  [] Preparation  [] Action  [] Maintenance  [] Relapse [] Pre Contemplation  [] Contemplation  [] No  Type: walking and strengthening   Frequency: 3-4d/wk  Duration: 20-25min Home Exercise  [x] Yes    [] No  Type: weights/walking  Frequency:4d/wk  Duration: 30min Home Exercise  [] Yes    [] No  Type: **  Frequency: **  Duration: ** Home Exercise  [] Yes    [] No  Type: **  Frequency: **  Duration: ** Home Exercise  [] Yes    [] No  Type: **  Frequency: **  Duration: ** Home Exercise  [] Yes    [] No  Type: **  Frequency: **  Duration: **   Angina with Activity? [] Yes    [x] No    Angina with Activity? [x] Yes    [] No  Angina Management: pt has been having CP that comes and goes throughout the day. She sees Dr. Sugey Real today and plans to inform him. Angina with Activity? [] Yes    [] No  Angina Management: ** Angina with Activity? [] Yes    [] No  Angina Management: ** Angina with Activity? [] Yes    [] No  Angina Management: ** Angina with Activity?   [] Yes    [] No  Angina Management: **   EXERCISE PLAN EXERCISE PLAN EXERCISE PLAN EXERCISE PLAN EXERCISE PLAN EXERCISE PLAN   *Interventions* *Interventions* *Interventions* *Interventions* *Interventions* *Interventions*   Exercise Prescription  (per physician & CR staff) Exercise Prescription  (per physician & CR staff) Exercise Prescription  (per physician & CR staff) Exercise Prescription  (per physician & CR staff) Exercise Prescription  (per physician & CR staff) Exercise Prescription  (per physician & CR staff)   Cardiovascular Cardiovascular Cardiovascular Cardiovascular Cardiovascular Cardiovascular   Mode:    [x] Treadmill (TM)  [x] Schwinn Airdyne (AD)  [x] Arms Ergometer (AE)  [x] NuStep    MODE:    [x] Treadmill (TM)  [x] Schwinn Airdyne (AD)  [x] Arms Ergometer (AE) MODE:    [] Treadmill (TM)  [] Schwinn Airdyne (AD)  [] Arms Ergometer (AE)  [] NuStep  [] Elliptical (E) MODE:    [] Treadmill (TM)  [] Schwinn Airdyne (AD)  [] Arms Ergometer (AE)  [] NuStep  [] Elliptical (E) MODE:    [] Treadmill (TM)  [] Schwinn Airdyne (AD)  [] Arms Ergometer (AE)  [] NuStep  [] Elliptical (E) MODE:    [] Treadmill (TM)  [] Schwinn Airdyne (AD)  [] Arms Ergometer (AE)  [] NuStep  [] Elliptical (E)   Initial Workloads  TM: [email or web address] 2.8 METs  AD: 0.7 level = 2.6 METs  NS: 50  Galeana= 2.6 METs  AE: 0.7 level = 2.6 METs Current Workloads  TM: 2.6 @ 0%= 3.0 METs  AD:  0.8level = 2.8 METs  AE:  0.4level = 1.9 METs Current Workloads  TM:  @ %=  METs  AD:  level =  METs  NS:   Galeana=  METs  AE:  level =  METs Current Workloads  TM:  @ %=  METs  AD:  level =  METs  NS:   Galeana=  METs  AE:  level =  METs Current Workloads  TM:  @ %=  METs  AD:  level =  METs  NS:   Galeana=  METs  AE:  level =  METs Current Workloads  TM:  @ %=  METs  AD:  level =  METs  NS:   Galeana=  METs  AE:  level =  METs      Frequency:    ICR: 3x/week  Home: 2-3x/wk Frequency:   ICR: 3x/week  Home: 3x/wk Frequency:  ICR: 3x/week  Home: 3-4x/wk Frequency:  ICR: 3x/week  Home: 3-4x/wk Frequency:  ICR: 3x/week  Home: 3-4x/wk Frequency:  Lisa will continue exercise at  5-7 days/week   Duration:   Total aerobic exercise = 30-45 min     5-8 min/mode Duration:  Total aerobic exercises = 32 min      12-15min/mode Duration:  Total aerobic exercises = ** min      **min/mode Duration:  Total aerobic exercises = ** min      **min/mode Duration:  Total aerobic exercises = ** min      **min/mode Duration:  Total erobic exercise =  60-90 min   Intensity:   MET Level =2.8  RPE = 12-15 Intensity:  Max MET Level = 3.0  RPE = 12-15 Intensity:  Max MET Level = **  RPE = 12-15 Intensity:  Max MET Level = **  RPE = 12-15 Intensity:  Max MET Level = **  RPE = 12-15 Intensity:  Max MET Level = ** RPE = 12-15   Progression: increase aerobic activity up to 15 min over next 4 weeks by increasing time 2-3 min/week.  Progression:  Increase exercise intensity by 5-10% over the next 4 weeks Progression:    Progression: Progression: Progression:  Increase time/intensity when RPE <13, and HR is in [employer] Strength Training Strength Training Strength Training Strength Training   [x] Yes      [] No  Upper and Lower body strength training 2x/wk     Wt: 5#       Reps:  8-15     *Increase wt. after completing 15 reps with an RPE of <12/13. [x] Yes      [] No  Upper and Lower body strength training 2x/wk     Wt: 5#       Reps:  8-15     *Increase wt. after completing 15 reps with an RPE of <12/13. [] Yes      [] No  Upper and Lower body strength training 2x/wk     Wt: **#       Reps:  8-15     *Increase wt. after completing 15 reps with an RPE of <12/13. [] Yes      [] No  Upper and Lower body strength training 2x/wk     Wt: **#       Reps:  8-15     *Increase wt. after completing 15 reps with an RPE of <12/13. [] Yes      [] No  Upper and Lower body strength training 2x/wk     Wt: **#       Reps:  8-15     *Increase wt. after completing 15 reps with an RPE of <12/13. Continue Strength Training at home   [] Exercise Log & Strength training handout given     Wt: **#       Reps:  8-15     *Increase wt. after completing 15 reps with an RPE of <12/13. Flexibility Flexibility Flexibility Flexibility Flexibility Flexibility   [x] Yes      [] No  25 min session of Core Strength & Flexibility 1x/per week  Attends Core Strength & Flexibility   [x] Yes      [] No Attends Core Strength & Flexibility   [] Yes      [] No Attends Core Strength & Flexibility   [] Yes      [] No Attends Core Strength & Flexibility   [] Yes      [] No Continue Core Strength & Flexibility at home   Home Exercise  *Lisa verbalizes planning to walk 2 days/week for 30 min on days not in rehab. Home Exercise  *Lisa verbalizes planning to walk 4days/week for 30 min on days not in rehab. Home Exercise  *Lisa verbalizes planning to ** ** days/week for ** min on days not in rehab. Home Exercise  *Lisa verbalizes planning to ** ** days/week for ** min on days not in rehab. Home Exercise  *Lisa verbalizes planning to ** ** days/week for ** min on days not in rehab.  Home Exercise  *Lisa verbalizes his/her plan to ** ** days/week for ** min @ **   *Education* *Education* *Education* *Education* *Education* *Education*   RPE Scale  [x] Yes      [] No  Exercise Safety  [x] Yes      [] No  Equipment Orientation  [x] Yes      [] No  S/S to Report  [x] Yes      [] No  Warm Up/Cool Down  [x] Yes      [] No  Home Exercise  [x] Yes      [] No         All Exercise Education Completed  [] Yes      [] No   Exercise Education Recommended     Workshops  [x] Benefits of Exercise  [x] Balance Training & Fall Prevention  [x] Heart Disease Risk Reduction  [x] Yoga & Heart Health Exercise Education Attended/Date       Exercise Education Attended/Date Exercise Education Attended/Date Exercise Education Attended/Date All Sessions Completed? [] Yes  [] No   *Goals* *Goals* *Goals* *Goals* *Goals* *Goals*   Initial Exercise Goals Exercise Goals  Exercise Goals   Exercise Goals  Exercise Goals  Exercise Goals   Lisa plans to:  [x] Attend exercise sessions 3x/wk  [x] initiate home exercise 2-3x/wk for 10-20 min  [x] Increase 6 min walk distance by 10%  [x] Attend Exercise workshops Lisa plans to:  [x] Attend exercise sessions 3x/wk  [x] continue home exercise 2-3x/wk for 20-30 min  [x] Attend Exercise workshops Lisa's plans to:  [x] Attend exercise sessions 3x/wk  [x] continue home exercise 3-4x/wk for 30-45 min  [x] Determine plan of exercise following rehab  [x] Attend Exercise workshops Lisa's plans to:  [x] Attend exercise sessions 3x/wk  [x] continue home exercise 3-4x/wk for 30-45 min  [x] Determine plan of exercise following rehab  [x] Attend Exercise workshops Lisa's plans to:  [x] Attend exercise sessions 3x/wk  [x] continue home exercise 3-4x/wk for 30-45 min  [x] Determine plan of exercise following rehab  [x] Attend Exercise workshops Lisa achieved exercise goals?     []  Yes    [] No  If no, why?  **  [] Increased 6 min walk distance by 10%  [] Currently exercising 30-60 min/day, 5-7days/wk   [] Plans to continue exercise on own  [] Plans to join a local fitness center to continue exercise  [] Does not plan to continue to exercise after rehab   Mutually agreed upon goals:  Lisa would like to build strength and endurance, and be able to walk a 5k again. Progress towards mutually agreed upon goals:  Lisa  is making progress Progress towards mutually agreed upon goals:  Lisa  ** Progress towards mutually agreed upon goals:  Lisa ** Progress towards mutually agreed upon goals:  Lisa  ** Progress towards mutually agreed upon goals:  Lisa  **    *MET level required for above goal:  4.6 METs MET level Achieved:  3. 0METs MET level Achieved:  **METs MET level Achieved:  **METs MET level Achieved:  **METs MET level Achieved:  **METs      Individual Cardiac Treatment Plan - Nutrition  NUTRITION  ASSESSMENT/PLAN NUTRITION  REASSESSMENT NUTRITION   REASSESSMENT NUTRITION   REASSESSMENT NUTRITION  DISCHARGE/FOLLOW-UP NUTRITION  DISCHARGE/FOLLOW-UP   Stages of Change Stages of Change Stages of Change Stages of Change Stages of Change Stages of Change   [] Pre Contemplation  [x] Contemplation  [] Preparation  [] Action  [] Maintenance  [] Relapse [] Pre Contemplation  [x] Contemplation  [] Preparation  [] Action  [] Maintenance  [] Relapse [] Pre Contemplation  [] Contemplation  [] Preparation  [] Action  [] Maintenance  [] Relapse [] Pre Contemplation  [] Contemplation  [] Preparation  [] Action  [] Maintenance  [] Relapse [] Pre Contemplation  [] Contemplation  [] Preparation  [] Action  [] Maintenance  [] Relapse [] Pre Contemplation  [] Contemplation  [] Preparation  [] Action  [] Maintenance  [] Relapse   NUTRITION ASSESSMENT NUTRITION ASSESSMENT NUTRITION ASSESSMENT NUTRITION ASSESSMENT NUTRITION ASSESSMENT NUTRITION ASSESSMENT   Weight Management  Weight: 144        Height: 5'2\"   BMI: 26.3  Weight Management  Weight: 144.2                  Weight Management  Weight: **                   Weight Management  Weight: ** Weight Management  Weight: ** Weight Management  Weight: **                    BMI: **   Eating Plan  Current eating habits: \"does not have much of an appetite, watching sodium and fluid\" Eating Plan  Changes: \"unknown\"  Eating Plan  Changes: Eating Plan  Changes: Eating Plan  Changes: Eating Plan Improvements:   Alcohol Use  [x] none          [] daily  [] weekly      [] special              Diet Assessment Tool:  RATE YOUR PLATE  *Given to patient to complete and return. Diet Assessment Tool:    Score: 54/72            Diet Assessment Tool: RATE YOUR PLATE  Score: **/69   NUTRITION PLAN NUTRITION PLAN NUTRITION PLAN NUTRITION PLAN NUTRITION PLAN NUTRITION PLAN   *Interventions* *Interventions* *Interventions* *Interventions* *Interventions* *Interventions*   Initial Survey given Goal Setting Discussion:   [x] Yes      [] No             Follow Up Survey Reviewed & Goals Updated:      Professional Referral  Please check if needed. [] Dietitian Consult   [] Wt. Management Referral  [] Other:  Professional Referral  Please check if needed. [] Dietitian Consult   [] Wt. Management Referral  [] Other: Professional Referral  Please check if needed. [] Dietitian Consult   [] Wt. Management Referral  [] Other: Professional Referral  Please check if needed. [] Dietitian Consult   [] Wt. Management Referral  [] Other: Professional Referral  Please check if needed. [] Dietitian Consult   [] Wt. Management Referral  [] Other: Professional Referral  Please check if needed. [] Dietitian Consult   [] Wt.  Management Referral  [] Other:   *Education* *Education* *Education* *Education* *Education* *Education*   Nutritional Education Recommended     [x] 1:1 Registered Dietitian     Workshops  [x] Label Reading   [x] Menu  [x] Targeting Nutrition Priorities  [x] Mindful Eating    Nutritional Education Attended/Date Nutritional Education Attended/Date Nutritional Education Attended/Date Nutritional Education Attended/Date All Sessions Completed? [] Yes  [] No   Cooking School  Recommended      [x] Adding Flavor  [x] Fast & Healthy     Breakfasts  [x] Salads & Dressings  [x] Savory Soups  [x] Simple Sides & Sauces  [x] Appetizers &     Snacks  [x] Delicious Desserts  [x] Plant-Based Proteins  [x] Fast Evening Meals  [x] Weekend Breakfasts  [x] Efficiency Cooking  [x] One-Pot [employer]  Sessions Completed    [facility]  Sessions Completed [facility]  Sessions Completed       [facility]  Sessions Completed Cooking School     # of sessions completed:  **   *Goals* *Goals* *Goals* *Goals* *Goals* *Goals*   Lisa's nutritional goals are as follows:  Complete and return diet survey Lisa's nutritional goals are as follows:  [x] Attend Nutrition Workshops  [x] Attend 1:1   [x] Attend Cooking Classes    Lisa's nutritional goals are as follows:  [] Attend Nutrition Workshops  [] Attend 1:1   [] Attend Cooking Classes  [] Complete and return diet survey  [] ** Lisa's nutritional goals are as follows:  [] Attend Nutrition Workshops  [] Attend 1:1   [] Attend Cooking Classes  [] ** Lisa's nutritional goals are as follows:  [] Attend Nutrition Workshops  [] Attend 1:1   [] Attend Cooking Classes  [] ** Lisa achieved nutritional goals   [] Yes    [] No  If no, why?   Use knowledge gained to continue Pritikin eating plan at home         Individual Cardiac Treatment Plan - Psychosocial  PSYCHOSOCIAL  ASSESSMENT/PLAN PSYCHOSOCIAL  REASSESSMENT PSYCHOSOCIAL   REASSESSMENT PSYCHOSOCIAL   REASSESSMENT PSYCHOSOCIAL  DISCHARGE/FOLLOW-UP PSYCHOSOCIAL  DISCHARGE/FOLLOW-UP   Stages of Change Stages of Change Stages of Change Stages of Change Stages of Change Stages of Change   [] Pre Contemplation  [] Contemplation  [x] Preparation  [] Action  [] Maintenance  [] Relapse [] Pre Contemplation  [] Contemplation  [x] Preparation  [] Action  [] Maintenance  [] Relapse [] Pre Contemplation  [] Contemplation  [] Preparation  [] Action  [] Maintenance  [] Relapse [] Pre Contemplation  [] Contemplation  [] Preparation  [] Action  [] Maintenance  [] Relapse [] Pre Contemplation  [] Contemplation  [] Preparation  [] Action  [] Maintenance  [] Relapse [] Pre Contemplation  [] Contemplation  [] Preparation  [] Action  [] Maintenance  [] Relapse   PSYCHOSOCIAL ASSESSMENT PSYCHOSOCIAL ASSESSMENT PSYCHOSOCIAL ASSESSMENT PSYCHOSOCIAL ASSESSMENT PSYCHOSOCIAL ASSESSMENT PSYCHOSOCIAL ASSESSMENT   Behavioral Outcomes Behavioral Outcomes Behavioral Outcomes Behavioral Outcomes Behavioral Outcomes Behavioral Outcomes   PHQ-9 score 19  Depression Severity  []Minimal  []Mild   []Moderate  [x]Moderately Severe  []Severe       PHQ-9 score **  Depression Severity  []Minimal  []Mild   []Moderate  []Moderately Severe []Severe PHQ-9 score **  Depression Severity  []Minimal  []Mild   []Moderate  []Moderately Severe []Severe   Does patient have Family Support? [x] Yes      [] No  No signs of marital/family distress             Within the Past Month:  *Have you wished you were dead or wished you could go to sleep and not wake up? [] Yes      [x] No  *Have you had any thoughts of killing yourself? [] Yes      [x] No                Using a scale of 0-10, 0=none, 10=very:   Rate your depression: 8  Rate your anxiety:  8  Using a scale of 0-10, 0=none, 10=very:   Rate your depression: 8  Rate your anxiety:  9 Using a scale of 0-10, 0=none, 10=very:   Rate your depression: **  Rate your anxiety:  ** Using a scale of 0-10, 0=none, 10=very:   Rate your depression: **  Rate your anxiety:  ** Using a scale of 0-10, 0=none, 10=very:   Rate your depression: **  Rate your anxiety:  ** Using a scale of 0-10, 0=none, 10=very:   Rate your depression: **  Rate your anxiety:  **   Signs and Symptoms of Depression Present?     [x] Yes      [] No  If yes, please explain:  pt reports that her heart failure was a surprise, doctors think it is viral. She has had to completely change her everyday life. No longer working or able to do the things she used to. Her  tries to be supportive but she states that it isn't the type of support she needs. She doesn't feel confident in her ability to do every day activities. Signs and Symptoms of Depression Present? [x] Yes      [] No  If yes, please explain:  see previous. She is depressed about all her life change she has had to make ( not being able to go out with friends, eat what she wants, work) Signs and Symptoms of Depression Present? [] Yes      [] No  If yes, please explain:  ** Signs and Symptoms of Depression Present? [] Yes      [] No  If yes, please explain:  ** Signs and Symptoms of Depression Present? [] Yes      [] No  If yes, please explain:  ** Signs and Symptoms of Depression Present? [] Yes      [] No  If yes, please explain:  **   Signs and Symptoms of Anxiety Present? [x] Yes      [] No  If yes, please explain:  see above  Signs and Symptoms of Anxiety Present? [x] Yes      [] No  If yes, please explain:  see previous Signs and Symptoms of Anxiety Present? [] Yes      [] No  If yes, please explain:  ** Signs and Symptoms of Anxiety Present? [] Yes      [] No  If yes, please explain:  ** Signs and Symptoms of Anxiety Present? [] Yes      [] No  If yes, please explain:  ** Signs and Symptoms of Anxiety Present? [] Yes      [] No  If yes, please explain:  **   [] Patient has poor eye contact   [] Flat affect present. [] Signs of anxiety, anger or hostility    [] Signs social isolation present.    []  Signs of alcohol or substance abuse             PSYCHOSOCIAL PLAN PSYCHOSOCIAL PLAN PSYCHOSOCIAL PLAN PSYCHOSOCIAL PLAN PSYCHOSOCIAL PLAN PSYCHOSOCIAL PLAN   *Interventions* *Interventions* *Interventions* *Interventions* *Interventions* *Interventions*   *Please check if needed  [] Psych Consult  [] Physician Referral  [x] Stress Management Skills *Please check if needed  [] Psych Consult  [] Physician Referral  [x] Stress Management Skills *Please check if needed  [] Psych Consult  [] Physician Referral  [] Stress Management Skills *Please check if needed  [] Psych Consult  [] Physician Referral  [] Stress Management Skills *Please check if needed  [] Psych Consult  [] Physician Referral  [] Stress Management Skills *Please check if needed  [] Psych Consult  [] Physician Referral  [] Stress Management Skills   Is patient currently taking anti-depressant or anti-anxiety medications? [x] Yes      [] No  If yes, please list medications:  EFFEXOR Change in anti-depressant or anti-anxiety medications? [] Yes      [x] No    Change in anti-depressant or anti-anxiety medications? [] Yes      [] No  If yes, please list medications:  ** Change in anti-depressant or anti-anxiety medications? [] Yes      [] No  If yes, please list medications:  ** Change in anti-depressant or anti-anxiety medications? [] Yes      [] No  If yes, please list medications:  ** Change in anti-depressant or anti-anxiety medications?   [] Yes      [] No  If yes, please list medications:  **   *Education* *Education* *Education* *Education* *Education* *Education*   Healthy Mind-Set Workshops Recommended  [x] Stress & Health  [x] Taking Charge of Stress  [x] New Thoughts, New Behaviors  [x] Managing Moods & Relationships Healthy Mind-Set Workshops Attended/Date     11/2/22 manage moods  Healthy Mind-Set Workshops Attended/Date Healthy Mind-Set Workshops Attended/Date Healthy Mind-Set Workshops  Completed  [] Yes      [] No Healthy Mind-Set Workshops  Completed  [] Yes      [] No   *Goals* *Goals* *Goals* *Goals* *Goals* *Goals*   Lisa's psychosocial goals are as follows:  Complete HADS & Abilio & Viral, Quality of Life Index, Cardiac Version IV Lisa's psychosocial goals are as follows:  [x] Attend Healthy Mind-Set Workshops  [x] Reduce depression symptom severity by 1 level    Lisa's psychosocial goals are as follows:  [] Attend Healthy Mind-Set Workshops  [] Reduce depression symptom severity by 1 level  [] ** Lisa's psychosocial goals are as follows:  [] Attend Healthy Mind-Set Workshops  [] Reduce depression symptom severity by 1 level  [] ** Ilsa achieved psychosocial goals? [] Yes    [] No  If no, why?  **  [] Use methods learned to continue to reduce depression symptom severity by 1 level  [] ** Lisa achieved psychosocial goals?   [] Yes    [] No  If no, why?  **  [] Use methods learned to continue to reduce depression symptom severity by 1 level  [] **      Individual Cardiac Treatment Plan - Other:  Risk Factor/Education  RISK FACTOR  ASSESSMENT/PLAN RISK FACTOR  REASSESSMENT  RISK FACTOR  REASSESSMENT RISK FACTOR  REASSESSMENT RISK FACTOR   DISCHARGE/FOLLOW-UP RISK FACTOR   DISCHARGE/FOLLOW-UP   Stages of Change Stages of Change Stages of Change Stages of Change Stages of Change Stages of Change   [] Pre Contemplation  [x] Contemplation  [] Preparation  [] Action  [] Maintenance  [] Relapse [] Pre Contemplation  [x] Contemplation  [] Preparation  [] Action  [] Maintenance  [] Relapse [] Pre Contemplation  [] Contemplation  [] Preparation  [] Action  [] Maintenance  [] Relapse [] Pre Contemplation  [] Contemplation  [] Preparation  [] Action  [] Maintenance  [] Relapse [] Pre Contemplation  [] Contemplation  [] Preparation  [] Action  [] Maintenance  [] Relapse [] Pre Contemplation  [] Contemplation  [] Preparation  [] Action  [] Maintenance  [] Relapse   RISK FACTOR/EDUCATION ASSESSMENT RISK FACTOR/EDUCATION ASSESSMENT RISK FACTOR/EDUCATION ASSESSMENT RISK FACTOR/EDUCATION ASSESSMENT RISK FACTOR /EDUCATION ASSESSMENT RISK FACTOR /EDUCATION ASSESSMENT   Hypertension  [] Yes      [x] No    Resting BP: 88/66  Peak Ex BP:94/66  Medication: toprol, JARDIANCE    Hypertension  Resting BP: 100/66  Peak Ex BP:104/62  Medication Changes:  [] Yes      [x] No Hypertension  Resting BP: **  Peak Ex BP:**  Medication Changes:  [] Yes      [] No Hypertension  Resting BP: **  Peak Ex BP:**  Medication Changes:  [] Yes      [] No Hypertension  Resting BP: **  Peak Ex BP:**  Medication Changes:  [] Yes      [] No Hypertension  Resting BP: **  Peak Ex BP:**  Medication Changes:  [] Yes      [] No   Lipids  HLD/DLD  [] Yes      [x] No  TOTAL CHOL: 206  HDL:  62  LDL:  121  TRI  Medication: na Lipids  Medication Changes:  [] Yes      [x] No       Lipids  Medication Changes:  [] Yes      [] No       Lipids  Medication Changes:  [] Yes      [] No       Lipids     TOTAL CHOL: **  HDL:  **  LDL:  **  TRIG:  **  Medication Changes:  [] Yes      [] No Lipids     TOTAL CHOL: **  HDL:  **  LDL:  **  TRIG:  **  Medication Changes:  [] Yes      [] No   Diabetes  [] Yes      [x] No  FBS: 91           HbA1C: na        Monitor BS @ home:   [] Yes      [x] No    Diabetes  na    Diabetes  Most Recent BS:  BS have been in range  [] Yes      [] No  Medication Changes  [] Yes      [] No       Diabetes  Most Recent BS:  BS have been in range  [] Yes      [] No  Medication Changes  [] Yes      [] No       Diabetes  Most Recent BS:  BS have been in range  [] Yes      [] No  Medication Changes  [] Yes      [] No Diabetes  Most Recent BS:  BS have been in range  [] Yes      [] No  Medication Changes  [] Yes      [] No         Tobacco Use  [] Current  [x] Former  [] Never     Date Quit: 2022    Tobacco Use  Change in smoking status   [] Yes      [x] No          Tobacco Use  Change in smoking status   [] Yes      [] No     Quit date: **    Tobacco Use  Change in smoking status   [] Yes      [] No     Quit date: ** Tobacco Use  Change in smoking status   [] Yes      [] No     Quit date: ** Tobacco Use  Change in smoking status   [] Yes      [] No     Quit date: **                    Learning Barriers  Please select one:  [] Speech  [] Literacy  [] Hearing  [] Cognitive  [] Vision  [x] Ready to Learn Learning Barriers Addressed:   [x] Yes      [] No    Learning Barriers Addressed:   [] Yes      [] No    Learning Barriers Addressed:  [] Yes      [] No Learning Barriers Addressed:  [] Yes      [] No Learning Barriers Addressed:  [] Yes      [] No      RISK FACTOR/EDUCATION PLAN RISK FACTOR/EDUCATION PLAN RISK FACTOR/EDUCATION PLAN RISK FACTOR/EDUCATION PLAN RISK FACTOR/EDUCATION PLAN RISK FACTOR/EDUCATION PLAN   *Interventions* *Interventions* *Interventions* *Interventions* *Interventions* *Interventions*   Recommended Educational Videos     [x] Overview of The Pritikin Eating Plan  [x] Heart Disease Risk Reduction  [x] Move It! [x] Calorie Density  [x] Healthy Minds, Bodies, Hearts  [x] Label Reading  [x] Metabolic Syndrome & Belly   Or  [] How Our Thoughts Can Heal our Heart  [x] Dining Out-Part 1  [x] Biomechanical Limitations  [x] Facts on Fat  [x] Hypertension & Heart Disease  [x] Diseases of Our Times-Focusing on Diabetes  [x] Body Composition  [x] Nurtition Action Plan  [x] Exercise Action Plan    Completed Videos/Date        10/18/22  Overview of The Pritikin Eating Plan     Heart disease risk reduction 10/24/22     Move it 10/31/22     Calorie density 11/9/22     Label reading 1 11/14/22     Facts on fat 11/11/22     Planning eating start 10/26/22     Becoming  10/28/22 Completed Videos/Date Completed Videos/Date Completed Videos/Date Recommended Educational Videos Completed     [] Yes      [] No     **If not completed, Why? **                 Smoking Cessation/Relaspe Prevention Intervention needed? [x] Yes      [] No  *Pt verbalizes and agrees to attend intervention Smoking Cessation/Relapse Prevention Scheduled? NA Smoking Cessation/Relapse Prevention completed? [] Yes      [] No  Date: ** Smoking Cessation/Relapse Prevention completed? [] Yes      [] No  Date: ** Smoking Cessation/Relapse Prevention completed?    [] Yes      [] No  Date: ** Smoking Cessation Counseling attended  [] Yes      [] No  **If not completed, Why? **   Professional Referrals:  *Please check if needed  [] Diabetes Clinic  [] Lipid Clinic   [] Other:          Professional Referrals:  *Please check if needed  [] Diabetes Clinic  [] Lipid Clinic   [] Other:   Preventative Medication Preventative Medication Preventative Medication Preventative Medication Preventative Medication Preventative Medication   Aspirin  [x] Yes    [] No  Blood Thinner: Clopidogrel/Effient/Brillinta  [] Yes    [x] No  Beta Blocker  [x] Yes    [] No  Ace Inhibitor  [] Yes    [x] No  Statin/Lipid Lowering  [] Yes    [x] No Medication Changes? [] Yes    [x] No Medication Changes? [] Yes    [] No Medication Changes? [] Yes    [] No Medication Changes? [] Yes    [] No Medication Changes? [] Yes    [] No   *Education* *Education* *Education* *Education* *Education* *Education*   Does Lisa require any additional education? [] Yes    [x] No    Does Lisa require any additional education? [] Yes    [x] No Does Lisa require any additional education? [] Yes    [] No Does Lisa require any additional education? [] Yes    [] No Does Lisa require any additional education? [] Yes    [] No Does Lisa require any additional education?   [] Yes    [] No   Additional Educational Videos     Exercise  [] Improve Performance     Medical  [] Aging Enhancing Your QoL  [] Biology of Weight Control  [] Decoding Lab Results  [] Diseases of Our Time - Overview  [] Sleep Disorders     Nutrition  [] Dining Out - Part 2  [] Fueling a Healthy Body  [] Menu Workshop  [] Planning Your Eating Strategy  [] Targeting Your Nutrition Priorities  [] Vitamins & Minerals     Healthy Mind-Set  [] Smoking Cessation     Culinary  []Becoming a Pritikin    [] Cooking - Breakfast & Snacks  [] Cooking -Healhty Salads & Dressing  [] Cooking -Dinner & Sides  [] Cooking -Soups & Desserts     Overview  [] The Pritikin Solution Additional Educational Videos Completed Additional Educational Videos Completed Additional Educational Videos Completed Additional Educational Videos Completed Additional Educational Videos Completed     [] Yes    [] No   *Goals* *Goals* *Goals* *Goals* *Goals* *Goals*   Lisa's risk factor/education goals are as follows:     [x] Optimal BP <140/90  [] Blood Sugar <120  [x] Attend recommended video education sessions  [x] Takes medications as prescribed 100% of the time     Lisa's risk factor/education goals are as follows:     [x] Optimal BP <140/90  [] Blood Sugar <120  [x] Attend recommended video education sessions  [x] Takes medications as prescribed 100% of the time     Lisa's risk factor/education goals are as follows:     [x] Optimal BP <140/90  [] Blood Sugar <120  [x] Attend recommended video education sessions  [x] Takes medications as prescribed 100% of the time   [] ** Lisa's risk factor/education goals are as follows:     [x] Optimal BP <140/90  [] Blood Sugar <120  [x] Attend recommended video education sessions  [x] Takes medications as prescribed 100% of the time   [] ** Lisa's risk factor/education goals are as follows:     [x] Optimal BP <140/90  [] Blood Sugar <120  [x] Attend recommended video education sessions  [x] Takes medications as prescribed 100% of the time   [] ** Lisa achieved risk factor goals?   [] Yes    [] No  If no, why?  **      Monitored telemetry has revealed sinus tach     Monitored telemetry has revealed NSR Monitored telemetry has revealed  [] documented arrhythmia at increasing workloads  [] associated symptoms ** Monitored telemetry has revealed **  [] documented arrhythmia at increasing workloads  [] associated symptoms ** Monitored telemetry has revealed **  [] documented arrhythmia at increasing workloads  [] associated symptoms ** Monitored telemetry has revealed **  [] documented arrhythmia at increasing workloads  [] associated symptoms **   Physician Response     [x] Cardiac rehab is reasonably and medically necessary for continuous cardiac monitoring surveillance  of patient's cardiac activity  [x] Initiate continuous telemetry monitoring and notify me with any concerns  [] Other    Physician Response     [x] Cardiac rehab is reasonably and medically necessary for continuous cardiac monitoring surveillance  of patient's cardiac activity  [x] Continue continuous telemetry monitoring and notify me with any concerns  [] Other       Physician Response     [x] Cardiac rehab is reasonably and medically necessary for continuous cardiac monitoring surveillance  of patient's cardiac activity  [x] Continue continuous telemetry monitoring and notify me with any concerns   [] Other       Physician Response     [x] Cardiac rehab is reasonably and medically necessary for continuous cardiac monitoring surveillance  of patient's cardiac activity  [x] Continue continuous telemetry monitoring and notify me with any concerns   [] Other       Physician Response     [x] Cardiac rehab is reasonably and medically necessary for continuous cardiac monitoring surveillance  of patient's cardiac activity  [x] Continue continuous telemetry monitoring and notify me with any concerns   [] Other        Cosigned by: Venita Zuniga MD at 10/19/2022 11:56 AM      Revision History  Date/Time User Provider Type Action   10/19/2022 11:56 AM Vneita Zuniga MD Physician Cosign   10/18/2022  1:46 PM Roberto Grey Exercise Physiologist Sign                    Hanane Rung on 11/14/2022    Cecil Rung on 11/14/2022      Detailed Report    Additional Documentation    Encounter Info:  Billing Info,  History,  Allergies,  Detailed Report       Plan of Care Info    Author Note Status Last Update User Last Update Date/Time   Roberto Grey Incomplete Roberto Grey 11/14/2022  1:54 PM     Chart Review Routing History    No routing history on file.   Orders Placed    None  Medication Changes      None     Medication List     Visit Diagnoses      None     Problem List

## 2022-12-14 ENCOUNTER — HOSPITAL ENCOUNTER (OUTPATIENT)
Dept: CARDIAC REHAB | Age: 52
Setting detail: THERAPIES SERIES
End: 2022-12-14
Payer: COMMERCIAL

## 2022-12-14 RX ORDER — DIGOXIN 125 MCG
125 TABLET ORAL DAILY
Qty: 30 TABLET | Refills: 5 | Status: SHIPPED | OUTPATIENT
Start: 2022-12-14

## 2022-12-15 ENCOUNTER — TELEPHONE (OUTPATIENT)
Dept: CARDIOLOGY CLINIC | Age: 52
End: 2022-12-15

## 2022-12-15 NOTE — TELEPHONE ENCOUNTER
----- Message from Corky Ivan sent at 12/15/2022  2:09 PM EST -----  Regarding: Dental work  Good afternoon! I am due for dental cleaning and wanted to double check if I need to do anything special such as antibiotics before hand or am I good to go?    Thank you,  Lisa

## 2022-12-16 ENCOUNTER — APPOINTMENT (OUTPATIENT)
Dept: CARDIAC REHAB | Age: 52
End: 2022-12-16
Payer: COMMERCIAL

## 2022-12-19 ENCOUNTER — HOSPITAL ENCOUNTER (OUTPATIENT)
Dept: CARDIAC REHAB | Age: 52
Setting detail: THERAPIES SERIES
End: 2022-12-19
Payer: COMMERCIAL

## 2022-12-19 ENCOUNTER — APPOINTMENT (OUTPATIENT)
Dept: CARDIAC REHAB | Age: 52
End: 2022-12-19
Payer: COMMERCIAL

## 2022-12-21 ENCOUNTER — HOSPITAL ENCOUNTER (OUTPATIENT)
Dept: CARDIAC REHAB | Age: 52
Setting detail: THERAPIES SERIES
End: 2022-12-21
Payer: COMMERCIAL

## 2022-12-21 ENCOUNTER — APPOINTMENT (OUTPATIENT)
Dept: CARDIAC REHAB | Age: 52
End: 2022-12-21
Payer: COMMERCIAL

## 2022-12-23 ENCOUNTER — APPOINTMENT (OUTPATIENT)
Dept: CARDIAC REHAB | Age: 52
End: 2022-12-23
Payer: COMMERCIAL

## 2022-12-23 ENCOUNTER — HOSPITAL ENCOUNTER (OUTPATIENT)
Dept: CARDIAC REHAB | Age: 52
Setting detail: THERAPIES SERIES
End: 2022-12-23
Payer: COMMERCIAL

## 2022-12-26 ENCOUNTER — HOSPITAL ENCOUNTER (OUTPATIENT)
Dept: CARDIAC REHAB | Age: 52
Setting detail: THERAPIES SERIES
End: 2022-12-26
Payer: COMMERCIAL

## 2022-12-26 ENCOUNTER — APPOINTMENT (OUTPATIENT)
Dept: CARDIAC REHAB | Age: 52
End: 2022-12-26
Payer: COMMERCIAL

## 2022-12-28 ENCOUNTER — HOSPITAL ENCOUNTER (OUTPATIENT)
Dept: CARDIAC REHAB | Age: 52
Setting detail: THERAPIES SERIES
End: 2022-12-28
Payer: COMMERCIAL

## 2022-12-30 ENCOUNTER — APPOINTMENT (OUTPATIENT)
Dept: CARDIAC REHAB | Age: 52
End: 2022-12-30
Payer: COMMERCIAL

## 2022-12-30 ENCOUNTER — HOSPITAL ENCOUNTER (OUTPATIENT)
Dept: CARDIAC REHAB | Age: 52
Setting detail: THERAPIES SERIES
End: 2022-12-30
Payer: COMMERCIAL

## 2023-02-06 ENCOUNTER — OFFICE VISIT (OUTPATIENT)
Dept: CARDIOLOGY CLINIC | Age: 53
End: 2023-02-06
Payer: COMMERCIAL

## 2023-02-06 VITALS
OXYGEN SATURATION: 99 % | SYSTOLIC BLOOD PRESSURE: 120 MMHG | HEART RATE: 75 BPM | WEIGHT: 143.2 LBS | BODY MASS INDEX: 26.19 KG/M2 | DIASTOLIC BLOOD PRESSURE: 84 MMHG

## 2023-02-06 DIAGNOSIS — I42.8 NONISCHEMIC CARDIOMYOPATHY (HCC): ICD-10-CM

## 2023-02-06 DIAGNOSIS — I50.22 CHF (CONGESTIVE HEART FAILURE), NYHA CLASS II, CHRONIC, SYSTOLIC (HCC): Primary | ICD-10-CM

## 2023-02-06 DIAGNOSIS — I44.7 LBBB (LEFT BUNDLE BRANCH BLOCK): ICD-10-CM

## 2023-02-06 PROCEDURE — 99214 OFFICE O/P EST MOD 30 MIN: CPT | Performed by: NURSE PRACTITIONER

## 2023-02-06 ASSESSMENT — ENCOUNTER SYMPTOMS
COUGH: 0
ABDOMINAL DISTENTION: 0
SHORTNESS OF BREATH: 0

## 2023-02-06 NOTE — PATIENT INSTRUCTIONS
You may receive a survey regarding the care you received during your visit. Your input is valuable to us. We encourage you to complete and return your survey. We hope you will choose us in the future for your healthcare needs.     Continue:  Continue current medications  Daily weights and record  Fluid restriction of 2 Liters per day  Limit sodium in diet to around 1572-6106 mg/day  Monitor BP  Activity as tolerated     Call the Heart Failure Clinic for any of the following symptoms: [phone]  Weight gain of 2-3 pounds in 1 day or 5 pounds in 1 week  Increased shortness of breath  Shortness of breath while laying down  Cough  Chest pain  Swelling in feet, ankles or legs  Tenderness or bloating in the abdomen  Fatigue   Decreased appetite or nausea   Confusion

## 2023-02-06 NOTE — PROGRESS NOTES
Heart Failure Clinic       Visit Date: 2/6/2023  Cardiologist:  Dr. Brisa Gates  Primary Care Physician: Dr. Michele Javier MD    Nicholas Machado is a 47 y.o. female who presents today for:  Chief Complaint   Patient presents with    Congestive Heart Failure       HPI:   Nicholas Machado is a 47 y.o. female who presents to the office for a follow up patient visit in the heart failure clinic. Accompanied by     TYPE HF: HFrEF (10-15% - 20-25% 11/2022)  Cause: NICM - unknown etiology - likely viral - no ETOH or family hx  Device: lifevest  HX: HLD, Hypothyroidism, Depression, former smoker, Covid 12/2021, LBBB     Dry Wt:  150     Hospitalization:  Aug 2022 - SOB, new severe CMP. Sinus tach - started Dig    12/2022 - OV [facility]  Impression / Plan:  Thus, in summary,  is a 47 y.o. female with nonischemic cardiomyopathy, LBBB, hyperlipidemia, hypothyroidism, and depression. 1.) Nonischemic cardiomyopathy / chronic systolic heart failure / LBBB: NYHA Class II, ACC/AHA class C, and clinical class A (warm and dry). Continue current GDMT including Entresto 49/51, Toprol , empagliflozin 10, and lasix. EF remains low despite GDMT and LBBB, so will refer to EP for CRT-D consideration. She will complete a sleep study locally next month and complete cardiac rehab. Will plan for follow up and VO2 testing in ~ 4 months (which should be after CRT placement). Discussed genetic testing - she may be interested in the future, but wishes to defer today. Thank you for the opportunity to participate in the care of your patient. I will continue to follow her in our [address] [place] [address] along with you with plans to see her back in 4 months. In the meantime, please do not hesitate to contact me with any questions or concerns that you may have. Sincerely  Enrique Davalos, [facility]    OV 12/2022 Dr Marie Britton - plan for CRT-D - scheduled for tomorrow    TODAY:  doing well - still some fatigue.   Tires easily. Plan for CRT-D tomorrow   Scheduled for appt w/ Dr Jazmyn Carter in March - sleep eval  BPs have been better of recent - 120s. Patient has:  Chest Pain: no  SOB: no   Orthopnea/PND: no  DELROY: no  Edema: no  Fatigue: stable  Abdominal bloating: no  Cough: no  Appetite: good  Home weight: stable  Home blood pressure: good    Past Medical History:   Diagnosis Date    Depression     Hypercholesterolemia     Hypothyroidism     New onset of congestive heart failure ([address] 8/4/2022     Past Surgical History:   Procedure Laterality Date    ABLATION OF DYSRHYTHMIC FOCUS      BREAST SURGERY  2007    implant bilateral breast    TONSILLECTOMY      TUBAL LIGATION       Family History   Problem Relation Age of Onset    Cancer Father         Lung Cancer     Social History     Tobacco Use    Smoking status: Some Days     Packs/day: 0.10     Years: 20.00     Pack years: 2.00     Types: Cigarettes    Smokeless tobacco: Never   Substance Use Topics    Alcohol use: Yes     Comment: occassionally      Current Outpatient Medications   Medication Sig Dispense Refill    UNABLE TO FIND CBD gummie with melatonin      ALPRAZolam (XANAX) 0.5 MG tablet Take 1-2 tablets by mouth nightly as needed for Sleep for up to 30 days. G47.00 Max Daily Amount: 1 mg 60 tablet 0    digoxin (LANOXIN) 125 MCG tablet Take 1 tablet by mouth daily 30 tablet 5    metoprolol succinate (TOPROL XL) 100 MG extended release tablet Take 1 tablet by mouth daily 90 tablet 1    furosemide (LASIX) 20 MG tablet Take 1 tablet by mouth daily as needed (for weight gain or swelling) 30 tablet 1    sacubitril-valsartan (ENTRESTO) 49-51 MG per tablet Take 1 tablet by mouth 2 times daily 60 tablet 5    empagliflozin (JARDIANCE) 10 MG tablet Take 1 tablet by mouth in the morning.  30 tablet 5    levothyroxine (SYNTHROID) 75 MCG tablet TAKE 1 TABLET DAILY 90 tablet 3    venlafaxine (EFFEXOR XR) 150 MG extended release capsule TAKE 1 CAPSULE ONCE DAILY (Patient taking differently: Take 150 mg by mouth daily) 90 capsule 3    ibuprofen (ADVIL;MOTRIN) 200 MG tablet Take 200 mg by mouth every 6 hours as needed for Pain      acetaminophen (TYLENOL) 500 MG tablet Take 500 mg by mouth every 6 hours as needed for Pain       No current facility-administered medications for this visit. No Known Allergies    SUBJECTIVE:   Review of Systems   Constitutional:  Positive for fatigue. Negative for activity change and appetite change. Respiratory:  Negative for cough and shortness of breath. Cardiovascular:  Negative for chest pain, palpitations and leg swelling. Gastrointestinal:  Negative for abdominal distention. Neurological:  Negative for weakness, light-headedness and headaches. Hematological:  Negative for adenopathy. Psychiatric/Behavioral:  Negative for sleep disturbance. OBJECTIVE:   Today's Vitals:  /84   Pulse 75   Wt 143 lb 3.2 oz (65 kg)   SpO2 99%   BMI 26.19 kg/m²     Physical Exam  Vitals reviewed. Constitutional:       General: She is not in acute distress. Appearance: Normal appearance. She is well-developed. She is not diaphoretic. HENT:      Head: Normocephalic and atraumatic. Eyes:      Conjunctiva/sclera: Conjunctivae normal.   Neck:      Comments: No JVD  Cardiovascular:      Rate and Rhythm: Normal rate and regular rhythm. Heart sounds: Normal heart sounds. No murmur heard. Pulmonary:      Effort: Pulmonary effort is normal. No respiratory distress. Breath sounds: Normal breath sounds. No wheezing or rales. Abdominal:      General: Bowel sounds are normal. There is no distension. Palpations: Abdomen is soft. Tenderness: There is no abdominal tenderness. Musculoskeletal:         General: Normal range of motion. Cervical back: Normal range of motion and neck supple. Right lower leg: No edema. Left lower leg: No edema. Skin:     General: Skin is warm and dry.       Capillary Refill: Capillary refill takes less than 2 seconds. Neurological:      Mental Status: She is alert and oriented to person, place, and time. Coordination: Coordination normal.   Psychiatric:         Behavior: Behavior normal.         Wt Readings from Last 3 Encounters:   02/06/23 143 lb 3.2 oz (65 kg)   11/14/22 145 lb (65.8 kg)   10/03/22 147 lb (66.7 kg)     BP Readings from Last 3 Encounters:   02/06/23 120/84   11/14/22 124/68   10/03/22 100/60     Pulse Readings from Last 3 Encounters:   02/06/23 75   11/14/22 88   10/03/22 89     Body mass index is 26.19 kg/m². ECHO   Summary   Mild-moderately dilated left ventricular size and severely reduced   systolic function. There were severe global hypokinesis. Paradoxical septal motion. Wall thickness was within normal limits. Ejection fraction was estimated at 20-25%. IVC size is within normal limits with normal respiratory phasic changes. Signature      ----------------------------------------------------------------   Electronically signed by Desiree Dorantes MD (Interpreting   physician) on 11/03/2022 at 04:25 PM   ----------------------------------------------------------------       ECHO:    Summary   Moderately dilated left ventricular size and severely reduce systolic   function. There was severe global hypokinesis. Wall thickness was within normal limits. Ejection fraction was estimated at 10-15%. The right ventricular size was normal with moderately reduced systolic   function and normal wall thickness. There was trace mitral regurgitation. There was mild tricuspid regurgitation. Assuming RAP = 10 mmHg, the   estimated RVSP = 29 mmHg.    IVC size is dilated with reduced respiratory phasic changes (CVP~5-10   mmHg)      Signature      ----------------------------------------------------------------   Electronically signed by Desiree Dorantes MD (Interpreting   physician) on 08/04/2022 at 01:12 PM           CATH/STRESS:   RIGHT HEART PRESSURES:  1.  RA is 16.  2.  RV is 55/9, 22.  3.  PA is 53/32, 41.  4.  Wedge pressure is 29.  5.  LVEDP was measured to be 37 mmHg. 6.  AO sat was 87.  7.  PA is 57.  8.  Cardiac output was 2.88 and cardiac index was calculated at 1.6. The patient tolerated the procedure well. There were no immediate  complications. We gave IV Lasix on the table due to elevated LVEDP. She was transferred to her room in stable condition. SUMMARY:  1. Elevated right-sided filling pressures, elevated wedge pressure. 2.  Patent coronaries. RECOMMENDATIONS:  1. Continue IV diuresis. 2.  Monitor hemodynamics closely. 3.  Optimize heart failure therapies. 4. Lifevest on discharge  5.  CHF clinic referral           Mary Guzmán MD     D: 08/04/2022 16:57:09      Cardiac MRI        Results reviewed:  BNP: No results found for: BNP  CBC:   Lab Results   Component Value Date/Time    WBC 6.7 08/05/2022 07:06 AM    RBC 5.24 08/05/2022 07:06 AM    RBC 5.26 04/30/2018 09:52 AM    HGB 15.9 08/05/2022 07:06 AM    HCT 49.5 08/05/2022 07:06 AM     08/05/2022 07:06 AM     CMP:    Lab Results   Component Value Date/Time     11/11/2022 03:24 PM    K 4.8 11/11/2022 03:24 PM    K 3.8 08/03/2022 04:10 PM    CL 98 11/11/2022 03:24 PM    CO2 28 11/11/2022 03:24 PM    BUN 14 11/11/2022 03:24 PM    CREATININE 0.9 11/11/2022 03:24 PM    LABGLOM >60 11/11/2022 03:20 PM    GLUCOSE 97 11/11/2022 03:24 PM    GLUCOSE 90 04/30/2018 09:52 AM    CALCIUM 9.7 11/11/2022 03:24 PM     Hepatic Function Panel:    Lab Results   Component Value Date/Time    ALKPHOS 118 08/03/2022 04:10 PM    ALT 45 08/03/2022 04:10 PM    AST 32 08/03/2022 04:10 PM    PROT 6.5 08/03/2022 04:10 PM    BILITOT 1.1 08/03/2022 04:10 PM    BILIDIR 0.2 10/19/2011 08:05 AM    LABALBU 4.1 08/03/2022 04:10 PM    LABALBU 4.5 10/19/2011 08:05 AM     Magnesium:  No results found for: MG  PT/INR:    Lab Results   Component Value Date/Time    INR 1.24 08/04/2022 11:09 AM     Lipids:    Lab Results   Component Value Date/Time    TRIG 116 06/13/2022 11:57 AM    HDL 62 06/13/2022 11:57 AM    LDLCALC 121 06/13/2022 11:57 AM    LABVLDL 18 04/30/2018 09:52 AM       ASSESSMENT AND PLAN:      Diagnosis Orders   1. CHF (congestive heart failure), NYHA class II, chronic, systolic ([address]        2. Nonischemic cardiomyopathy ([address]        3. LBBB (left bundle branch block)            Continue:  GDMT:              ACE/ARB/ARNI - Entresto 49/51 BID              BB - Toprol 100/day              SGLT2 -  Jardiance 10/day  AA - Aldactone 25/day - STOPPED 8/2022 d/t elevated K+ 5.6  Diuretic - Lasix 20/day PRN - occasional use  Vasodilator - no  Other - Digoxin  HFrEF (10-15% improved 20-25% 11/2022), NYHA II  NICM - viral induced  LBBB    Stable, appears Euvolemic  Lab reviewed - stable  Repeat blood work tomorrow w/ CRT-D  BP/HR stable   No med changes today - pt to watch BP after procedure tomorrow - if able would like to increase Entresto to 97/103   Continue diet/fluid adherence  Continue daily wts. Completed Cardiac rehab. New pt appt w/ Pulmonary in March for sleep eval   F/U [facility] in April  F/U w/ Cardiology/[facility] in May  F/U in clinic in 6 months    Tolerating above noted HF meds, no ill side effects noted. Will continue to monitor kidney function and electrolytes. Will optimize as tolerated. Pt is compliant w/ medications. Total visit time of 30 minutes has been spent with patient on education of symptoms, management, medication, and plan of care; as well as review of chart: labs, ECHO, radiology reports, etc.   I personally spent more then 50% of the appt time face to face with the patient. Daily weights  Fluid restriction of 2 Liters per day  Limit sodium in diet to around 9240-4919 mg/day  Monitor BP  Activity as tolerated     Patient was instructed to call the [facility] for any changes in symptoms as noted in AVS.      No follow-ups on file.  or sooner if needed Patient given educational materials - see patient instructions. We discussed the importance of weighing oneself and recording daily. We also discussed the importance of a low sodium diet, higher sodium foods to avoid and better low sodium food options. Patient verbalizes understanding of plan of care using teach back method, and is agreeable to the treatment plan.        Electronically signed by DILIP Hurtado CNP on 2/6/2023 at 12:42 PM

## 2023-02-14 ENCOUNTER — NURSE ONLY (OUTPATIENT)
Dept: CARDIOLOGY CLINIC | Age: 53
End: 2023-02-14
Payer: COMMERCIAL

## 2023-02-14 DIAGNOSIS — Z95.810 ICD (IMPLANTABLE CARDIOVERTER-DEFIBRILLATOR) IN PLACE: Primary | ICD-10-CM

## 2023-02-14 PROCEDURE — 93284 PRGRMG EVAL IMPLANTABLE DFB: CPT | Performed by: INTERNAL MEDICINE

## 2023-02-14 NOTE — PROGRESS NOTES
In [facility] ICD ---   [facility] home monitor ordered  Patient of Marilyn -- faxed to office [phone]    Large dressing removed. Incision cleaned. Picture taken. New steri-strips applied.     If, at any point, there is any increased redness, swelling, increased discomfort, fever, or the incision opens up, the patient is aware to go to the emergency room    Reviewed ICD shock plan/home monitoring     Battery 10.5 years    Presenting rhythm AS BV  Underlying AS VS    A Impedance 556  RV Impedance 340  LV Impedance 663    RV shock 65    P wave sensing 2.2  R wave sensing 13  LV wave sensing 17.8    A Threshold 0.7 @ 0.4  RV Thresholds 1.0 @ 0.40  LV Thresholds 2.1 @ 1.0    A Paced 50%  RV Paced 100%  LV Paced 100%    Programmed Mode DDD     Afib [place] 0%    Episodes :   none    Heart Logic initializing

## 2023-03-02 ENCOUNTER — TELEPHONE (OUTPATIENT)
Dept: CARDIOLOGY CLINIC | Age: 53
End: 2023-03-02

## 2023-03-02 NOTE — TELEPHONE ENCOUNTER
----- Message from Lisa Ivan sent at 3/2/2023 12:39 PM EST -----  Regarding: Box for my pacemaker   Hello. I had an appt for pacemaker check, Feb 14, I still haven’t received the monitor that they ordered that day for the pacemaker.   Thank you,  Lisa Ivan

## 2023-03-09 ENCOUNTER — HOSPITAL ENCOUNTER (EMERGENCY)
Age: 53
Discharge: HOME | End: 2023-03-10
Attending: EMERGENCY MEDICINE
Payer: COMMERCIAL

## 2023-03-09 ENCOUNTER — APPOINTMENT (OUTPATIENT)
Dept: CT IMAGING | Age: 53
End: 2023-03-09
Payer: COMMERCIAL

## 2023-03-09 VITALS
OXYGEN SATURATION: 94 % | DIASTOLIC BLOOD PRESSURE: 95 MMHG | HEIGHT: 63 IN | TEMPERATURE: 98.2 F | SYSTOLIC BLOOD PRESSURE: 174 MMHG | RESPIRATION RATE: 16 BRPM | HEART RATE: 60 BPM | BODY MASS INDEX: 24.45 KG/M2 | WEIGHT: 138 LBS

## 2023-03-09 DIAGNOSIS — R51.9 NONINTRACTABLE HEADACHE, UNSPECIFIED CHRONICITY PATTERN, UNSPECIFIED HEADACHE TYPE: Primary | ICD-10-CM

## 2023-03-09 PROCEDURE — 96375 TX/PRO/DX INJ NEW DRUG ADDON: CPT

## 2023-03-09 PROCEDURE — 99284 EMERGENCY DEPT VISIT MOD MDM: CPT

## 2023-03-09 PROCEDURE — 70450 CT HEAD/BRAIN W/O DYE: CPT

## 2023-03-09 PROCEDURE — 6360000002 HC RX W HCPCS: Performed by: EMERGENCY MEDICINE

## 2023-03-09 PROCEDURE — 96374 THER/PROPH/DIAG INJ IV PUSH: CPT

## 2023-03-09 RX ORDER — DEXAMETHASONE SODIUM PHOSPHATE 4 MG/ML
10 INJECTION, SOLUTION INTRA-ARTICULAR; INTRALESIONAL; INTRAMUSCULAR; INTRAVENOUS; SOFT TISSUE ONCE
Status: COMPLETED | OUTPATIENT
Start: 2023-03-09 | End: 2023-03-09

## 2023-03-09 RX ORDER — PROCHLORPERAZINE EDISYLATE 5 MG/ML
10 INJECTION INTRAMUSCULAR; INTRAVENOUS ONCE
Status: COMPLETED | OUTPATIENT
Start: 2023-03-09 | End: 2023-03-09

## 2023-03-09 RX ORDER — KETOROLAC TROMETHAMINE 30 MG/ML
15 INJECTION, SOLUTION INTRAMUSCULAR; INTRAVENOUS ONCE
Status: COMPLETED | OUTPATIENT
Start: 2023-03-09 | End: 2023-03-09

## 2023-03-09 RX ADMIN — PROCHLORPERAZINE EDISYLATE 10 MG: 5 INJECTION INTRAMUSCULAR; INTRAVENOUS at 22:48

## 2023-03-09 RX ADMIN — DEXAMETHASONE SODIUM PHOSPHATE 10 MG: 4 INJECTION, SOLUTION INTRAMUSCULAR; INTRAVENOUS at 22:48

## 2023-03-09 RX ADMIN — KETOROLAC TROMETHAMINE 15 MG: 30 INJECTION, SOLUTION INTRAMUSCULAR at 22:49

## 2023-03-09 ASSESSMENT — PAIN - FUNCTIONAL ASSESSMENT
PAIN_FUNCTIONAL_ASSESSMENT: 0-10
PAIN_FUNCTIONAL_ASSESSMENT: 0-10

## 2023-03-09 ASSESSMENT — PAIN DESCRIPTION - LOCATION: LOCATION: HEAD

## 2023-03-09 ASSESSMENT — PAIN SCALES - GENERAL
PAINLEVEL_OUTOF10: 8
PAINLEVEL_OUTOF10: 10

## 2023-03-10 NOTE — ED PROVIDER NOTES
[address] ENCOUNTER        PATIENT NAME: Fina Ross  MRN: [id number]  : 1970  LE: 3/9/2023  PROVIDER: Venkata Austin MD    CHIEF COMPLAINT       Chief Complaint   Patient presents with    Headache       Patient is seen and evaluated in a timely fashion. Nurses Notes are reviewed and I agree except as noted in the HPI. HISTORY OF PRESENT ILLNESS   Lisa Correia is a 47 y.o. female who presents to Emergency Department with Headache     Sudden onset headache since 6 PM.  Headache is from bilateral frontal area and behind both eyes. Headache is rated at 10/10, associated with nausea, vomiting, photophobia. She also has mild neck pain, but no neck stiffness. She took some leftover oxycodone without headache relief. This HPI was provided by patient. REVIEW OF SYSTEMS   Ten-point review of systems is negative except those documented in above HPI including constitutional, HEENT, respiratory, cardiovascular, gastrointestinal, genitourinary, musculoskeletal, skin, neurological, hematological and behavioral    PASTMEDICAL HISTORY    has a past medical history of Depression, Hypercholesterolemia, Hypothyroidism, and New onset of congestive heart failure ([facility] [address] SURGICAL HISTORY      has a past surgical history that includes Tubal ligation; Breast surgery (); ablation of dysrhythmic focus; and Tonsillectomy.     CURRENT MEDICATIONS       Previous Medications    ACETAMINOPHEN (TYLENOL) 500 MG TABLET    Take 500 mg by mouth every 6 hours as needed for Pain    DIGOXIN (LANOXIN) 125 MCG TABLET    Take 1 tablet by mouth daily    EMPAGLIFLOZIN (JARDIANCE) 10 MG TABLET    Take 1 tablet by mouth daily    FUROSEMIDE (LASIX) 20 MG TABLET    Take 1 tablet by mouth daily as needed (for weight gain or swelling)    IBUPROFEN (ADVIL;MOTRIN) 200 MG TABLET    Take 200 mg by mouth every 6 hours as needed for Pain    LEVOTHYROXINE (SYNTHROID) 75 MCG TABLET    TAKE 1 TABLET DAILY METOPROLOL SUCCINATE (TOPROL XL) 100 MG EXTENDED RELEASE TABLET    Take 1 tablet by mouth daily    SACUBITRIL-VALSARTAN (ENTRESTO) 49-51 MG PER TABLET    Take 1 tablet by mouth 2 times daily    UNABLE TO FIND    CBD gummie with melatonin    VENLAFAXINE (EFFEXOR XR) 150 MG EXTENDED RELEASE CAPSULE    TAKE 1 CAPSULE ONCE DAILY       ALLERGIES     has No Known Allergies. FAMILY HISTORY     She indicated that her mother is alive. She indicated that her father is . family history includes Cancer in her father. SOCIAL HISTORY      reports that she has been smoking cigarettes. She has a 2.00 pack-year smoking history. She has never used smokeless tobacco. She reports current alcohol use. She reports that she does not use drugs. PHYSICAL EXAM      height is 5' 3\" (1.6 m) and weight is 138 lb (62.6 kg). Her oral temperature is 98.2 °F (36.8 °C). Her blood pressure is 174/95 (abnormal) and her pulse is 60. Her respiration is 16 and oxygen saturation is 94%. Physical Exam  Vitals and nursing note reviewed. Constitutional:       Appearance: She is well-developed. She is not diaphoretic. HENT:      Head: Normocephalic and atraumatic. Nose: Nose normal.      Comments: No paranasal sinus tenderness. Eyes:      General: No scleral icterus. Right eye: No discharge. Left eye: No discharge. Conjunctiva/sclera: Conjunctivae normal.      Pupils: Pupils are equal, round, and reactive to light. Neck:      Vascular: No JVD. Trachea: No tracheal deviation. Cardiovascular:      Rate and Rhythm: Normal rate and regular rhythm. Heart sounds: Normal heart sounds. No murmur heard. No friction rub. No gallop. Pulmonary:      Effort: Pulmonary effort is normal. No respiratory distress. Breath sounds: Normal breath sounds. No stridor. No wheezing or rales. Chest:      Chest wall: No tenderness. Abdominal:      General: Bowel sounds are normal. There is no distension. Palpations: Abdomen is soft. There is no mass. Tenderness: There is no abdominal tenderness. There is no guarding or rebound. Hernia: No hernia is present. Musculoskeletal:         General: No tenderness or deformity. Cervical back: Normal range of motion and neck supple. Lymphadenopathy:      Cervical: No cervical adenopathy. Skin:     General: Skin is warm and dry. Capillary Refill: Capillary refill takes less than 2 seconds. Coloration: Skin is not pale. Findings: No erythema or rash. Neurological:      Mental Status: She is alert and oriented to person, place, and time. Cranial Nerves: No cranial nerve deficit. Sensory: No sensory deficit. Motor: No abnormal muscle tone. Coordination: Coordination normal.      Deep Tendon Reflexes: Reflexes normal.   Psychiatric:         Behavior: Behavior normal.         Thought Content: Thought content normal.         Judgment: Judgment normal.       FORMAL DIAGNOSTIC RESULTS     RADIOLOGY: Interpretation per the Radiologist below, if available at the time of this note (none if blank):  CT HEAD WO CONTRAST   Final Result   1. No acute intracranial process. This document has been electronically signed by: Bel Loyd DO on    03/09/2023 10:53 PM      All CTs at this facility use dose modulation techniques and iterative    reconstructions, and/or weight-based dosing   when appropriate to reduce radiation to a low as reasonably achievable. LABS: (none if blank)  No results found for this visit on 03/09/23. (Any cultures that may have been sent were not resulted at the time of this patient visit)    [facility] ([facility]) and ED COURSE:     [facility] Summary:     Patient presents with frontal headache associated with nausea vomiting and photophobia.   Vital signs are stable except for blood pressure being elevated, but no signs of hypertensive emergency such as blurred vision, chest pain, shortness of breath, nausea, vomiting, or leg swelling. Head CT shows no acute intracranial process. Patient is administered migraine cocktail with significant headache resolution on reassessment. I do not feel patient needs further ED work-ups. Discharged with PCP follow-up in 3 to 5 days. ED Course as of 03/10/23 0047   Thu Mar 09, 2023   2200 Pt is seen and evaluated. [ROSEY]   Fri Mar 10, 2023   0043 CT HEAD WO CONTRAST  IMPRESSION:  1. No acute intracranial process. [ROSEY]      ED Course User Index  [ROSEY] Ariela Wyatt MD       Vitals:    03/09/23 2128 03/09/23 2253   BP: (!) 172/102 (!) 196/112   Pulse: 60 58   Resp: 18 18   Temp: 98.2 °F (36.8 °C)    TempSrc: Oral    SpO2: 92% 98%   Weight: 138 lb (62.6 kg)    Height: 5' 3\" (1.6 m)        1) Number and Complexity of Problems        Problem List This Visit:   Headache      Differential Diagnosis includes (but not limited to):   Tension headache, migraine headache, cluster headache, sinusitis. Diagnoses Considered but I have low suspicion of: Intracranial pathologies such as brain tumor, SAH, encephalitis, meningitis are discussed too, although unlikely at this time. Pertinent Comorbid Conditions:    See HPI, PMH and PSH    2)  Data Reviewed (none if left blank)    My Independent interpretations:       EKG:   None    External Documentation Reviewed:   Care everywhere in [facility] is reviewed. Previous patient encounter documents & history available on EMR was reviewed:   Yes    See Formal Diagnostic Results above for the lab and radiology tests and orders.     3)  Treatment and Disposition    ED Reassessment:  Stable ED stay    Case discussed with consulting clinician:    None    Shared Decision-Making:   Treatment plan and disposition discussed with the patient/family, questions answered     Code Status:    Reviewed with patient and/or family as Full code    ED Medications administered this visit:  (None if blank)  Medications   ketorolac (TORADOL) injection 15 mg (15 mg IntraVENous Given 3/9/23 2249)   prochlorperazine (COMPAZINE) injection 10 mg (10 mg IntraVENous Given 3/9/23 2248)   Dexamethasone Sodium Phosphate injection 10 mg (10 mg IntraVENous Given 3/9/23 2248)       PROCEDURES:   None     CRITICAL CARE:   None    FINAL IMPRESSION      1.  Nonintractable headache, unspecified chronicity pattern, unspecified headache type          DISPOSITION/PLAN   DISPOSITION Decision To Discharge 03/10/2023 12:43:58 AM      OUTPATIENT FOLLOW UP THE PATIENT:  Julian Gutierres MD  [address] [place] [address]  [phone]    In 3 days  ED discharge follow up  DISCHARGE MEDICATIONS:(None if blank)  New Prescriptions    No medications on file         MD Monica Watson MD  03/10/23 9461

## 2023-03-10 NOTE — ED NOTES
Pt resting quietly in bed at this time. Pt reports that her pain is now 8/10. Lights dimmed for comfort. Call light in reach.       Nicholas Fischer RN  03/09/23 1681

## 2023-03-10 NOTE — ED TRIAGE NOTES
Pt presents to the ED from home with complaints of having a severe headache that started around 6pm tonight. Pt states she took an oxycodone pill that she had left over from a procedure. States it has not relieved the pain. Pt rates pain a 10/10. Pt is alert and oriented x4 with respirations even and unlabored.

## 2023-04-18 ENCOUNTER — OFFICE VISIT (OUTPATIENT)
Dept: FAMILY MEDICINE CLINIC | Age: 53
End: 2023-04-18
Payer: COMMERCIAL

## 2023-04-18 VITALS
HEART RATE: 76 BPM | TEMPERATURE: 97.2 F | SYSTOLIC BLOOD PRESSURE: 124 MMHG | WEIGHT: 143.8 LBS | BODY MASS INDEX: 25.47 KG/M2 | RESPIRATION RATE: 18 BRPM | DIASTOLIC BLOOD PRESSURE: 78 MMHG

## 2023-04-18 DIAGNOSIS — G47.00 INSOMNIA, UNSPECIFIED TYPE: Primary | ICD-10-CM

## 2023-04-18 DIAGNOSIS — I50.22 CHRONIC SYSTOLIC (CONGESTIVE) HEART FAILURE (HCC): ICD-10-CM

## 2023-04-18 DIAGNOSIS — B33.24 VIRAL CARDIOMYOPATHY (HCC): ICD-10-CM

## 2023-04-18 PROCEDURE — 99214 OFFICE O/P EST MOD 30 MIN: CPT | Performed by: FAMILY MEDICINE

## 2023-04-18 RX ORDER — ALPRAZOLAM 0.5 MG/1
.5-1 TABLET ORAL NIGHTLY PRN
Qty: 60 TABLET | Refills: 0 | Status: SHIPPED | OUTPATIENT
Start: 2023-04-18 | End: 2023-05-18

## 2023-04-18 SDOH — ECONOMIC STABILITY: INCOME INSECURITY: HOW HARD IS IT FOR YOU TO PAY FOR THE VERY BASICS LIKE FOOD, HOUSING, MEDICAL CARE, AND HEATING?: NOT VERY HARD

## 2023-04-18 SDOH — ECONOMIC STABILITY: FOOD INSECURITY: WITHIN THE PAST 12 MONTHS, YOU WORRIED THAT YOUR FOOD WOULD RUN OUT BEFORE YOU GOT MONEY TO BUY MORE.: NEVER TRUE

## 2023-04-18 SDOH — ECONOMIC STABILITY: HOUSING INSECURITY
IN THE LAST 12 MONTHS, WAS THERE A TIME WHEN YOU DID NOT HAVE A STEADY PLACE TO SLEEP OR SLEPT IN A SHELTER (INCLUDING NOW)?: NO

## 2023-04-18 SDOH — ECONOMIC STABILITY: FOOD INSECURITY: WITHIN THE PAST 12 MONTHS, THE FOOD YOU BOUGHT JUST DIDN'T LAST AND YOU DIDN'T HAVE MONEY TO GET MORE.: NEVER TRUE

## 2023-04-18 ASSESSMENT — PATIENT HEALTH QUESTIONNAIRE - PHQ9
9. THOUGHTS THAT YOU WOULD BE BETTER OFF DEAD, OR OF HURTING YOURSELF: 0
SUM OF ALL RESPONSES TO PHQ QUESTIONS 1-9: 6
7. TROUBLE CONCENTRATING ON THINGS, SUCH AS READING THE NEWSPAPER OR WATCHING TELEVISION: 0
4. FEELING TIRED OR HAVING LITTLE ENERGY: 1
10. IF YOU CHECKED OFF ANY PROBLEMS, HOW DIFFICULT HAVE THESE PROBLEMS MADE IT FOR YOU TO DO YOUR WORK, TAKE CARE OF THINGS AT HOME, OR GET ALONG WITH OTHER PEOPLE: 2
SUM OF ALL RESPONSES TO PHQ9 QUESTIONS 1 & 2: 2
5. POOR APPETITE OR OVEREATING: 1
SUM OF ALL RESPONSES TO PHQ QUESTIONS 1-9: 6
2. FEELING DOWN, DEPRESSED OR HOPELESS: 1
3. TROUBLE FALLING OR STAYING ASLEEP: 0
8. MOVING OR SPEAKING SO SLOWLY THAT OTHER PEOPLE COULD HAVE NOTICED. OR THE OPPOSITE, BEING SO FIGETY OR RESTLESS THAT YOU HAVE BEEN MOVING AROUND A LOT MORE THAN USUAL: 0
SUM OF ALL RESPONSES TO PHQ QUESTIONS 1-9: 6
6. FEELING BAD ABOUT YOURSELF - OR THAT YOU ARE A FAILURE OR HAVE LET YOURSELF OR YOUR FAMILY DOWN: 2
SUM OF ALL RESPONSES TO PHQ QUESTIONS 1-9: 6
1. LITTLE INTEREST OR PLEASURE IN DOING THINGS: 1

## 2023-04-18 ASSESSMENT — ENCOUNTER SYMPTOMS
SORE THROAT: 0
CONSTIPATION: 0
BACK PAIN: 0
CHEST TIGHTNESS: 0
DIARRHEA: 0
SHORTNESS OF BREATH: 0
ABDOMINAL PAIN: 0
VOMITING: 0
COUGH: 0
EYE PAIN: 0
BLOOD IN STOOL: 0
RHINORRHEA: 0
WHEEZING: 0
NAUSEA: 0

## 2023-04-18 NOTE — PROGRESS NOTES
Lisa Ivan (:  1970) is a 47 y.o. female,Established patient, here for evaluation of the following chief complaint(s):  Follow-up (Refills of xanax)         ASSESSMENT/PLAN:  1. Insomnia, unspecified type  -     ALPRAZolam (XANAX) 0.5 MG tablet; Take 1-2 tablets by mouth nightly as needed for Sleep for up to 30 days. G47.00 Max Daily Amount: 1 mg, Disp-60 tablet, R-0Normal  -stable, controlled on prn xanax  Controlled Substance Monitoring:    Acute and Chronic Pain Monitoring:   RX Monitoring 2023   Periodic Controlled Substance Monitoring No signs of potential drug abuse or diversion identified. 2. Chronic systolic (congestive) heart failure  -stable, controlled on entresto, metoprolol and lasix  3. Viral cardiomyopathy ([address]  -stable, controlled on entresto, jardiance and metoprolol, ICD/Defib placment    No follow-ups on file. Subjective   SUBJECTIVE/OBJECTIVE:  HPI  Patient here today for a check up. Reviewed BMI of 25, normal.  Here for refills of xanax to help sleep. Lunesta did not help. Has sleep referral appt tomorrow. , current smoker, pmh reviewed. Review of Systems   Constitutional:  Negative for chills, fatigue, fever and unexpected weight change. HENT:  Negative for congestion, ear pain, rhinorrhea and sore throat. Eyes:  Negative for pain and visual disturbance. Respiratory:  Negative for cough, chest tightness, shortness of breath and wheezing. Cardiovascular:  Negative for chest pain and palpitations. Gastrointestinal:  Negative for abdominal pain, blood in stool, constipation, diarrhea, nausea and vomiting. Genitourinary:  Negative for difficulty urinating, frequency, hematuria and urgency. Musculoskeletal:  Negative for back pain, joint swelling, myalgias and neck pain. Skin:  Negative for rash. Neurological:  Negative for dizziness and headaches. Hematological:  Negative for adenopathy. Does not bruise/bleed easily.

## 2023-04-19 ENCOUNTER — OFFICE VISIT (OUTPATIENT)
Dept: PULMONOLOGY | Age: 53
End: 2023-04-19
Payer: COMMERCIAL

## 2023-04-19 VITALS
BODY MASS INDEX: 25.3 KG/M2 | HEART RATE: 90 BPM | OXYGEN SATURATION: 98 % | DIASTOLIC BLOOD PRESSURE: 90 MMHG | WEIGHT: 142.8 LBS | SYSTOLIC BLOOD PRESSURE: 126 MMHG | HEIGHT: 63 IN | TEMPERATURE: 98.1 F

## 2023-04-19 DIAGNOSIS — I10 ESSENTIAL HYPERTENSION: ICD-10-CM

## 2023-04-19 DIAGNOSIS — G47.10 HYPERSOMNIA: ICD-10-CM

## 2023-04-19 DIAGNOSIS — I50.22 CHRONIC SYSTOLIC CONGESTIVE HEART FAILURE (HCC): ICD-10-CM

## 2023-04-19 DIAGNOSIS — Z95.810 ICD (IMPLANTABLE CARDIOVERTER-DEFIBRILLATOR) IN PLACE: ICD-10-CM

## 2023-04-19 DIAGNOSIS — R06.83 SNORING: Primary | ICD-10-CM

## 2023-04-19 PROCEDURE — 3074F SYST BP LT 130 MM HG: CPT | Performed by: INTERNAL MEDICINE

## 2023-04-19 PROCEDURE — 99204 OFFICE O/P NEW MOD 45 MIN: CPT | Performed by: INTERNAL MEDICINE

## 2023-04-19 PROCEDURE — 3080F DIAST BP >= 90 MM HG: CPT | Performed by: INTERNAL MEDICINE

## 2023-04-19 NOTE — PATIENT INSTRUCTIONS
Recommendations/Plan:  -Will schedule patient for polysomnogram in the sleep lab to evaluate for obstructive VS central sleep apnea. -I had a discussion with patient regarding avialable treatment options for her sleep disorder breathing including but not limited to CPAP titration in the sleep lab Vs.Dental appliance placement with referral to a local dentist Vs other available surgical options including Uvulopalatopharyngoplasty, maxillomandibular ostomy,Inspire device placement and tracheostomy as last option. At the end of discussion, she is not decided on her   treatment if she found to have obstructive sleep apnea at this time.  -We will see Lisa Ivan back in 1week after the sleep study to go over the sleep study results and further management options.  -She was educated to practice good sleep hygiene practices. She was provided with a good sleep hygiene hand out.  -Lisa was advised to make earlier appointment with my clinic if she develops any worsening of sleep symptoms. She verbalizes understanding.  -Lisa was advised to not to drive any motor vehicles or operate heavy equipment until her sleep symptoms are under good control. Lisa Ivan verbalizes understanding.  - Saintenriqueair Gordon was educated about my impression and plan. She verbalizes understanding.

## 2023-04-19 NOTE — PROGRESS NOTES
Chief Complaint: New sleep consult no priors    Mallampati airway Class: 3  Neck Circumference: 13    [place] sleepiness score 4/19/23: 12  SAQLI: 48

## 2023-04-26 DIAGNOSIS — F32.A DEPRESSION, UNSPECIFIED DEPRESSION TYPE: ICD-10-CM

## 2023-04-26 DIAGNOSIS — F41.9 ANXIETY: ICD-10-CM

## 2023-04-26 RX ORDER — VENLAFAXINE HYDROCHLORIDE 150 MG/1
CAPSULE, EXTENDED RELEASE ORAL
Qty: 90 CAPSULE | Refills: 3 | OUTPATIENT
Start: 2023-04-26

## 2023-05-25 ENCOUNTER — OFFICE VISIT (OUTPATIENT)
Dept: CARDIOLOGY CLINIC | Age: 53
End: 2023-05-25
Payer: COMMERCIAL

## 2023-05-25 ENCOUNTER — NURSE ONLY (OUTPATIENT)
Dept: CARDIOLOGY CLINIC | Age: 53
End: 2023-05-25

## 2023-05-25 VITALS
SYSTOLIC BLOOD PRESSURE: 122 MMHG | DIASTOLIC BLOOD PRESSURE: 78 MMHG | HEART RATE: 80 BPM | HEIGHT: 63 IN | BODY MASS INDEX: 25.3 KG/M2

## 2023-05-25 DIAGNOSIS — Z95.810 ICD (IMPLANTABLE CARDIOVERTER-DEFIBRILLATOR) IN PLACE: Primary | ICD-10-CM

## 2023-05-25 DIAGNOSIS — I42.8 NONISCHEMIC CARDIOMYOPATHY (HCC): Primary | ICD-10-CM

## 2023-05-25 DIAGNOSIS — R00.0 TACHYCARDIA: ICD-10-CM

## 2023-05-25 PROBLEM — E11.9 TYPE 2 DIABETES MELLITUS (HCC): Status: ACTIVE | Noted: 2023-05-25

## 2023-05-25 PROCEDURE — 99214 OFFICE O/P EST MOD 30 MIN: CPT | Performed by: INTERNAL MEDICINE

## 2023-05-25 RX ORDER — SACUBITRIL AND VALSARTAN 97; 103 MG/1; MG/1
1 TABLET, FILM COATED ORAL 2 TIMES DAILY
COMMUNITY

## 2023-05-25 RX ORDER — METOPROLOL SUCCINATE 100 MG/1
100 TABLET, EXTENDED RELEASE ORAL DAILY
Qty: 90 TABLET | Refills: 1 | Status: SHIPPED | OUTPATIENT
Start: 2023-05-25

## 2023-05-25 RX ORDER — METOPROLOL SUCCINATE 50 MG/1
50 TABLET, EXTENDED RELEASE ORAL DAILY
Qty: 90 TABLET | Refills: 3 | Status: SHIPPED | OUTPATIENT
Start: 2023-05-25

## 2023-05-25 NOTE — PROGRESS NOTES
[id number] [facility] [address]  [place] [address]  Dept: [phone]  Dept Fax: [phone]  Loc: [phone]    Visit Date: 5/25/2023    Ms. Phoebe Byrd is a 47 y.o. female  who presented for:  Chief Complaint   Patient presents with    Follow-up       HPI:   HPI   46 yo F presents for follow-up CHF. Seeing [facility] with María. No chest pain, angina, LE, orthopnea, PND, sob at rest, LE edema, or syncope. She has described a fluttering sensation. Her interrogation shows possible SVT x 2 events - 15 and 27 seconds. Tolerating more and more GDMT. S/p BiV-ICD - no shocks. Able to do more ADLS. She has lost weight and working on diet. She gets winded on stairs with laundry. She is trying to walk daily. She can do 1 mile per day. VO2 83% predicted. BiV placed 2/7/23.         Current Outpatient Medications:     sacubitril-valsartan (ENTRESTO)  MG per tablet, Take 1 tablet by mouth 2 times daily, Disp: , Rfl:     empagliflozin (JARDIANCE) 10 MG tablet, Take 1 tablet by mouth daily, Disp: 90 tablet, Rfl: 1    digoxin (LANOXIN) 125 MCG tablet, Take 1 tablet by mouth daily, Disp: 30 tablet, Rfl: 5    metoprolol succinate (TOPROL XL) 100 MG extended release tablet, Take 1 tablet by mouth daily, Disp: 90 tablet, Rfl: 1    furosemide (LASIX) 20 MG tablet, Take 1 tablet by mouth daily as needed (for weight gain or swelling), Disp: 30 tablet, Rfl: 1    levothyroxine (SYNTHROID) 75 MCG tablet, TAKE 1 TABLET DAILY, Disp: 90 tablet, Rfl: 3    venlafaxine (EFFEXOR XR) 150 MG extended release capsule, TAKE 1 CAPSULE ONCE DAILY (Patient taking differently: Take 1 capsule by mouth daily), Disp: 90 capsule, Rfl: 3    ibuprofen (ADVIL;MOTRIN) 200 MG tablet, Take 1 tablet by mouth every 6 hours as needed for Pain, Disp: , Rfl:     acetaminophen (TYLENOL) 500 MG tablet, Take 1 tablet by mouth every 6 hours as needed for Pain, Disp: , Rfl:     Past

## 2023-05-25 NOTE — PROGRESS NOTES
In [address] ICD --- has latitude at home  Patient of Marcelo/Ramone    3m chronic values appt  Latitude monitor not connecting. Pt aware to try to send again.      Battery 6.5-->10 years    Presenting rhythm AS RVP LVP  Underlying ASVS    A Impedance 623  RV Impedance 405  LV Impedance 779  LVb impedance 760    RV shock 90    P wave sensing 4.4  R wave sensing 18  LV sensing 21.1    A Threshold 0.8 @ 0.40  -- amplitude turned to auto  RV Thresholds 0.9 @ 0.40 -- amplitude turned to auto  LV Thresholds 1.7 @ 0.40 -- amplitude turned to auto    A Paced 28%  RV Paced 98%  LV Paced 98%    Programmed Mode DDD     Afib [place] 0%    Episodes :   3/4/23- 27 seconds SVT   5/6/23-15 seconds SVT  ATR -- far fielding R wave -- increased atrial sensitivity to 1.5 per Dr Jessenia Ko 8 - WNL

## 2023-05-30 ENCOUNTER — HOSPITAL ENCOUNTER (OUTPATIENT)
Dept: SLEEP CENTER | Age: 53
Discharge: HOME | End: 2023-06-01
Payer: COMMERCIAL

## 2023-05-30 DIAGNOSIS — I50.22 CHRONIC SYSTOLIC CONGESTIVE HEART FAILURE (HCC): ICD-10-CM

## 2023-05-30 DIAGNOSIS — R06.83 SNORING: ICD-10-CM

## 2023-05-30 DIAGNOSIS — G47.10 HYPERSOMNIA: ICD-10-CM

## 2023-05-30 DIAGNOSIS — Z95.810 ICD (IMPLANTABLE CARDIOVERTER-DEFIBRILLATOR) IN PLACE: ICD-10-CM

## 2023-05-30 DIAGNOSIS — I10 ESSENTIAL HYPERTENSION: ICD-10-CM

## 2023-05-30 PROCEDURE — 95810 POLYSOM 6/> YRS 4/> PARAM: CPT

## 2023-05-31 LAB — STATUS: NORMAL

## 2023-06-02 NOTE — PROGRESS NOTES
[address] [place] [address]                               SLEEP STUDY REPORT    PATIENT NAME: KIKI Rousseau                       :        1970  MED REC NO:   [id number]                           ROOM:  ACCOUNT NO:   [de-identified]                           ADMIT DATE: 2023  PROVIDER:     JAMIE Alva Lot:  2023    DIAGNOSTIC POLYSOMNOGRAM REPORT    REFERRING PROVIDER:  Kaitlyn Woodward CNP    SLEEP SPECIALIST:  Valencia Marques MD    HISTORY OF PRESENT ILLNESS:  The patient is a 20-year-old female who  complains of snoring, nonrestorative sleep, excessive daytime  somnolence. The patient has history of nonischemic cardiomyopathy,  status post pacemaker replacement. Weight 142 pounds, height 63 inches,  BMI 25.2. METHODS:  The patient underwent digital polysomnography in compliance  with the standards and specifications from the AASM Manual including the  simultaneous recording of 3 EEG channels (F4-M1, C4-M1, and O2-M1 with  back up electrodes F3-M2, C3-M2, and O1-M2), 2 EOG channels (E1-M2, and  E2-M1,), EMG (chin, left & right leg), EKG, Nonin pulse oximetry with   less than 2 second averaging time, body position, airflow recorded by  oral-nasal thermal sensor and nasal air pressure transducer, plus  respiratory effort recorded by calibrated respiratory inductance  plethysmography (RIP), flow volume loop, sound and video. Sleep staging  and scoring followed the standard put forth by the American Academy of  Sleep Medicine and utilized the 1B obstructive hypopnea event  desaturation of 4 percent or greater. DETAILS OF THE STUDY:  Lights out 09:49 p.m., lights on 05:16 a.m. Total recording time 446 minutes, sleep period time 337 minutes, total  sleep time 304 minutes, sleep efficiency 68.1%. Latency to sleep 109  minutes, wake after sleep onset 33 minutes.   Latency to

## 2023-06-06 ENCOUNTER — HOSPITAL ENCOUNTER (OUTPATIENT)
Dept: NON INVASIVE DIAGNOSTICS | Age: 53
Discharge: HOME | End: 2023-06-06
Attending: INTERNAL MEDICINE
Payer: COMMERCIAL

## 2023-06-06 DIAGNOSIS — R00.0 TACHYCARDIA: ICD-10-CM

## 2023-06-06 DIAGNOSIS — I42.8 NONISCHEMIC CARDIOMYOPATHY (HCC): ICD-10-CM

## 2023-06-06 LAB
LV EF: 40 %
LVEF MODALITY: NORMAL

## 2023-06-06 PROCEDURE — 93306 TTE W/DOPPLER COMPLETE: CPT

## 2023-06-07 ENCOUNTER — OFFICE VISIT (OUTPATIENT)
Dept: PULMONOLOGY | Age: 53
End: 2023-06-07
Payer: COMMERCIAL

## 2023-06-07 ENCOUNTER — TELEPHONE (OUTPATIENT)
Dept: CARDIOLOGY CLINIC | Age: 53
End: 2023-06-07

## 2023-06-07 VITALS
BODY MASS INDEX: 26.29 KG/M2 | DIASTOLIC BLOOD PRESSURE: 62 MMHG | WEIGHT: 148.4 LBS | HEART RATE: 70 BPM | TEMPERATURE: 97.8 F | OXYGEN SATURATION: 99 % | HEIGHT: 63 IN | SYSTOLIC BLOOD PRESSURE: 118 MMHG

## 2023-06-07 DIAGNOSIS — G47.61 PLMD (PERIODIC LIMB MOVEMENT DISORDER): Primary | ICD-10-CM

## 2023-06-07 DIAGNOSIS — G47.10 HYPERSOMNIA: ICD-10-CM

## 2023-06-07 DIAGNOSIS — I10 ESSENTIAL HYPERTENSION: ICD-10-CM

## 2023-06-07 PROCEDURE — 3078F DIAST BP <80 MM HG: CPT | Performed by: INTERNAL MEDICINE

## 2023-06-07 PROCEDURE — 99214 OFFICE O/P EST MOD 30 MIN: CPT | Performed by: INTERNAL MEDICINE

## 2023-06-07 PROCEDURE — 3074F SYST BP LT 130 MM HG: CPT | Performed by: INTERNAL MEDICINE

## 2023-06-07 RX ORDER — ROPINIROLE 0.25 MG/1
TABLET, FILM COATED ORAL
Qty: 100 TABLET | Refills: 2 | Status: SHIPPED | OUTPATIENT
Start: 2023-06-07 | End: 2023-09-05

## 2023-06-07 NOTE — PROGRESS NOTES
Chief Complaint: Lisa is here for Psg results    Mallampati airway Class:2  Neck Circumference:. 13 Inches    [place] sleepiness score 6/7/23:   Sleep apnea quality of life questionnaire:.

## 2023-06-07 NOTE — PATIENT INSTRUCTIONS
Recommendations/Plan:  -Send serum ferritin level today. Patient advised to call my office with in a week to go over the serum ferritin level and further management. Patient verbalizes understanding.  -She was advised to taper and discontinue Xanax once her insomnia improved. -She was advised to discuss with her family physician regarding reducing the dose of Effexor XR to improve her REM sleep. She was noted to have decreased REM sleep during the sleep study- ? May be due venlafaxine (Effexor XR) and to consider for alternate treatment for anxiety I.e BuSpar.  -Start patient on Requip with titration doses as follows. Dispense Requip 0.25mg 100pills. Patient to take 1 tablet by mouth 2 hour before sleep daily for 5 days then take 2 tablets by mouth 2 hour before sleep for 5 days then take 3 tablets by mouth 2 hour before sleep for 5days then take 4 tablets by mouth 2 hour before sleep until you see skeep physician.  -She was advised to practice good sleep hygiene practices.   -She was educated about sleep restriction therapy and advised to practice to improve her insomnia. -She was educated about stimulus control therapy and advise to practice to improve her insomnia. -Lisa was advised to make earlier appointment with my clinic if she develops any worsening of sleep symptoms. She verbalizes understanding.  -Lisa was advised to not to drive any motor vehicles or operate heavy equipment until her sleep symptoms are under good control. Lisa Ivan verbalizes understanding.  -Schedule patient for follow up with my clinic in 3months for clinical reevaluation regarding her Periodic limb movement disorder and hypersomnia. Patient advised to make early appointment if needed. - Sandhya Lau was educated about my impression and plan. She verbalizes understanding.

## 2023-06-22 RX ORDER — FUROSEMIDE 20 MG/1
20 TABLET ORAL DAILY PRN
Qty: 30 TABLET | Refills: 1 | Status: SHIPPED | OUTPATIENT
Start: 2023-06-22

## 2023-07-11 DIAGNOSIS — G47.61 PLMD (PERIODIC LIMB MOVEMENT DISORDER): Primary | ICD-10-CM

## 2023-07-11 RX ORDER — PRAMIPEXOLE DIHYDROCHLORIDE 0.12 MG/1
TABLET ORAL
Qty: 100 TABLET | Refills: 2 | Status: SHIPPED | OUTPATIENT
Start: 2023-07-11 | End: 2023-10-09

## 2023-07-30 DIAGNOSIS — E03.4 HYPOTHYROIDISM DUE TO ACQUIRED ATROPHY OF THYROID: ICD-10-CM

## 2023-07-31 RX ORDER — LEVOTHYROXINE SODIUM 0.07 MG/1
TABLET ORAL
Qty: 90 TABLET | Refills: 3 | OUTPATIENT
Start: 2023-07-31

## 2023-08-07 ENCOUNTER — OFFICE VISIT (OUTPATIENT)
Dept: CARDIOLOGY CLINIC | Age: 53
End: 2023-08-07
Payer: COMMERCIAL

## 2023-08-07 VITALS
HEIGHT: 62 IN | WEIGHT: 149.2 LBS | OXYGEN SATURATION: 99 % | BODY MASS INDEX: 27.46 KG/M2 | HEART RATE: 74 BPM | DIASTOLIC BLOOD PRESSURE: 84 MMHG | SYSTOLIC BLOOD PRESSURE: 124 MMHG

## 2023-08-07 DIAGNOSIS — I50.22 CHF (CONGESTIVE HEART FAILURE), NYHA CLASS II, CHRONIC, SYSTOLIC (HCC): Primary | ICD-10-CM

## 2023-08-07 DIAGNOSIS — I44.7 LBBB (LEFT BUNDLE BRANCH BLOCK): ICD-10-CM

## 2023-08-07 DIAGNOSIS — I42.8 NONISCHEMIC CARDIOMYOPATHY (HCC): ICD-10-CM

## 2023-08-07 PROCEDURE — 99214 OFFICE O/P EST MOD 30 MIN: CPT | Performed by: NURSE PRACTITIONER

## 2023-08-07 ASSESSMENT — ENCOUNTER SYMPTOMS
COUGH: 0
ABDOMINAL DISTENTION: 0
SHORTNESS OF BREATH: 0

## 2023-08-07 NOTE — PROGRESS NOTES
sodium food options. Patient verbalizes understanding of plan of care using teach back method, and is agreeable to the treatment plan.        Electronically signed by DILIP Glover CNP on 8/7/2023 at 1:21 PM

## 2023-08-07 NOTE — PATIENT INSTRUCTIONS
You may receive a survey regarding the care you received during your visit. Your input is valuable to us. We encourage you to complete and return your survey. We hope you will choose us in the future for your healthcare needs. Your nurses today were Tiffanie and Dallas Julio.   Office hours:   Mon-Thurs 8-4:30  Friday 8-12  Phone: [phone]    Continue:  Continue current medications  Daily weights and record  Fluid restriction of 2 Liters per day  Limit sodium in diet to around 4189-9527 mg/day  Monitor BP  Activity as tolerated     Call the [address] for any of the following symptoms:   Weight gain of 2-3 pounds in 1 day or 5 pounds in 1 week  Increased shortness of breath  Shortness of breath while laying down  Cough  Chest pain  Swelling in feet, ankles or legs  Bloating in abdomen  Fatigue

## 2023-08-10 ENCOUNTER — HOSPITAL ENCOUNTER (OUTPATIENT)
Age: 53
Discharge: HOME | End: 2023-08-10
Payer: COMMERCIAL

## 2023-08-10 DIAGNOSIS — I50.22 CHF (CONGESTIVE HEART FAILURE), NYHA CLASS II, CHRONIC, SYSTOLIC (HCC): ICD-10-CM

## 2023-08-10 LAB
ANION GAP SERPL CALC-SCNC: 11 MEQ/L (ref 8–16)
BUN SERPL-MCNC: 14 MG/DL (ref 7–22)
CALCIUM SERPL-MCNC: 9.8 MG/DL (ref 8.5–10.5)
CHLORIDE SERPL-SCNC: 100 MEQ/L (ref 98–111)
CO2 SERPL-SCNC: 29 MEQ/L (ref 23–33)
CREAT SERPL-MCNC: 0.8 MG/DL (ref 0.4–1.2)
DIGOXIN SERPL-MCNC: 0.8 NG/ML (ref 0.5–2)
GFR SERPL CREATININE-BSD FRML MDRD: > 60 ML/MIN/1.73M2
GLUCOSE SERPL-MCNC: 102 MG/DL (ref 70–108)
POTASSIUM SERPL-SCNC: 4.5 MEQ/L (ref 3.5–5.2)
SODIUM SERPL-SCNC: 140 MEQ/L (ref 135–145)

## 2023-08-10 PROCEDURE — 36415 COLL VENOUS BLD VENIPUNCTURE: CPT

## 2023-08-10 PROCEDURE — 80048 BASIC METABOLIC PNL TOTAL CA: CPT

## 2023-08-10 PROCEDURE — 80162 ASSAY OF DIGOXIN TOTAL: CPT

## 2023-08-22 ENCOUNTER — PROCEDURE VISIT (OUTPATIENT)
Dept: CARDIOLOGY CLINIC | Age: 53
End: 2023-08-22
Payer: COMMERCIAL

## 2023-08-22 DIAGNOSIS — Z95.810 ICD (IMPLANTABLE CARDIOVERTER-DEFIBRILLATOR) IN PLACE: ICD-10-CM

## 2023-08-22 DIAGNOSIS — I50.22 CHF (CONGESTIVE HEART FAILURE), NYHA CLASS II, CHRONIC, SYSTOLIC (HCC): Primary | ICD-10-CM

## 2023-08-22 PROCEDURE — 93295 DEV INTERROG REMOTE 1/2/MLT: CPT | Performed by: INTERNAL MEDICINE

## 2023-08-22 PROCEDURE — 93296 REM INTERROG EVL PM/IDS: CPT | Performed by: INTERNAL MEDICINE

## 2023-08-22 NOTE — PROGRESS NOTES
Remote [employer] BiV ICD   Patient of Robertson    Battery 8.5 years    Presenting rhythm AS RVP LVP    A Impedance 641  RV Impedance 346  LV Impedance 883    RV shock 81    P wave sensing 6  R wave sensing -    A Threshold 0.6 @ 0.40  RV Thresholds 1.2 @ 0.40  LV Thresholds 1.7 @ 1.0    A Paced 34%  RV Paced 99%  LV Paced99%    Programmed Mode DDD 60    Afib [place] <1%    Episodes :   ATR - ?SVT rates 150s --longest 1 min 26 sec    Heart Logic 2

## 2023-08-23 RX ORDER — FUROSEMIDE 20 MG/1
TABLET ORAL
Qty: 30 TABLET | Refills: 1 | Status: SHIPPED | OUTPATIENT
Start: 2023-08-23

## 2023-08-29 ENCOUNTER — COMMUNITY OUTREACH (OUTPATIENT)
Dept: FAMILY MEDICINE CLINIC | Age: 53
End: 2023-08-29

## 2023-08-29 NOTE — PROGRESS NOTES
Patient's HM shows they are overdue for Colonoscopy, Mammogram.   [facility] [place] and  files searched  without success.

## 2023-09-06 ENCOUNTER — OFFICE VISIT (OUTPATIENT)
Dept: FAMILY MEDICINE CLINIC | Age: 53
End: 2023-09-06
Payer: COMMERCIAL

## 2023-09-06 VITALS
DIASTOLIC BLOOD PRESSURE: 72 MMHG | OXYGEN SATURATION: 95 % | RESPIRATION RATE: 18 BRPM | WEIGHT: 148.4 LBS | TEMPERATURE: 97.9 F | SYSTOLIC BLOOD PRESSURE: 120 MMHG | BODY MASS INDEX: 26.29 KG/M2 | HEART RATE: 60 BPM | HEIGHT: 63 IN

## 2023-09-06 DIAGNOSIS — F41.9 ANXIETY: ICD-10-CM

## 2023-09-06 DIAGNOSIS — Z12.31 ENCOUNTER FOR SCREENING MAMMOGRAM FOR MALIGNANT NEOPLASM OF BREAST: ICD-10-CM

## 2023-09-06 DIAGNOSIS — F32.A DEPRESSION, UNSPECIFIED DEPRESSION TYPE: ICD-10-CM

## 2023-09-06 DIAGNOSIS — Z12.11 SCREEN FOR COLON CANCER: ICD-10-CM

## 2023-09-06 DIAGNOSIS — Z00.00 WELL ADULT EXAM: Primary | ICD-10-CM

## 2023-09-06 DIAGNOSIS — E03.4 HYPOTHYROIDISM DUE TO ACQUIRED ATROPHY OF THYROID: ICD-10-CM

## 2023-09-06 PROBLEM — E11.9 TYPE 2 DIABETES MELLITUS (HCC): Status: RESOLVED | Noted: 2023-05-25 | Resolved: 2023-09-06

## 2023-09-06 PROCEDURE — 99396 PREV VISIT EST AGE 40-64: CPT | Performed by: FAMILY MEDICINE

## 2023-09-06 RX ORDER — LEVOTHYROXINE SODIUM 0.07 MG/1
TABLET ORAL
Qty: 90 TABLET | Refills: 3 | Status: SHIPPED | OUTPATIENT
Start: 2023-09-06

## 2023-09-06 RX ORDER — VENLAFAXINE HYDROCHLORIDE 150 MG/1
CAPSULE, EXTENDED RELEASE ORAL
Qty: 90 CAPSULE | Refills: 3 | Status: SHIPPED | OUTPATIENT
Start: 2023-09-06

## 2023-09-06 ASSESSMENT — ENCOUNTER SYMPTOMS
VOMITING: 0
DIARRHEA: 0
SHORTNESS OF BREATH: 0
CHEST TIGHTNESS: 0
NAUSEA: 0
COUGH: 0
RHINORRHEA: 0
EYE PAIN: 0
BLOOD IN STOOL: 0
WHEEZING: 0
ABDOMINAL PAIN: 0
SORE THROAT: 0
CONSTIPATION: 0
BACK PAIN: 0

## 2023-09-13 ENCOUNTER — HOSPITAL ENCOUNTER (EMERGENCY)
Age: 53
Discharge: HOME | End: 2023-09-13
Payer: COMMERCIAL

## 2023-09-13 ENCOUNTER — HOSPITAL ENCOUNTER (OUTPATIENT)
Age: 53
Discharge: HOME | End: 2023-09-13
Payer: COMMERCIAL

## 2023-09-13 VITALS
SYSTOLIC BLOOD PRESSURE: 136 MMHG | HEART RATE: 72 BPM | TEMPERATURE: 98.8 F | DIASTOLIC BLOOD PRESSURE: 102 MMHG | OXYGEN SATURATION: 95 % | RESPIRATION RATE: 18 BRPM

## 2023-09-13 DIAGNOSIS — G47.10 HYPERSOMNIA: ICD-10-CM

## 2023-09-13 DIAGNOSIS — G47.61 PLMD (PERIODIC LIMB MOVEMENT DISORDER): ICD-10-CM

## 2023-09-13 DIAGNOSIS — Z00.00 WELL ADULT EXAM: ICD-10-CM

## 2023-09-13 DIAGNOSIS — N30.01 ACUTE CYSTITIS WITH HEMATURIA: Primary | ICD-10-CM

## 2023-09-13 DIAGNOSIS — E03.4 HYPOTHYROIDISM DUE TO ACQUIRED ATROPHY OF THYROID: ICD-10-CM

## 2023-09-13 DIAGNOSIS — I10 ESSENTIAL HYPERTENSION: ICD-10-CM

## 2023-09-13 LAB — ICTOTEST: NEGATIVE

## 2023-09-13 PROCEDURE — 84439 ASSAY OF FREE THYROXINE: CPT

## 2023-09-13 PROCEDURE — 84443 ASSAY THYROID STIM HORMONE: CPT

## 2023-09-13 PROCEDURE — 99213 OFFICE O/P EST LOW 20 MIN: CPT | Performed by: NURSE PRACTITIONER

## 2023-09-13 PROCEDURE — 81001 URINALYSIS AUTO W/SCOPE: CPT

## 2023-09-13 PROCEDURE — 82728 ASSAY OF FERRITIN: CPT

## 2023-09-13 PROCEDURE — 99213 OFFICE O/P EST LOW 20 MIN: CPT

## 2023-09-13 PROCEDURE — 36415 COLL VENOUS BLD VENIPUNCTURE: CPT

## 2023-09-13 PROCEDURE — 87186 SC STD MICRODIL/AGAR DIL: CPT

## 2023-09-13 PROCEDURE — 87086 URINE CULTURE/COLONY COUNT: CPT

## 2023-09-13 PROCEDURE — 87077 CULTURE AEROBIC IDENTIFY: CPT

## 2023-09-13 RX ORDER — CEPHALEXIN 500 MG/1
500 CAPSULE ORAL 2 TIMES DAILY
Qty: 14 CAPSULE | Refills: 0 | Status: SHIPPED | OUTPATIENT
Start: 2023-09-13 | End: 2023-09-20

## 2023-09-13 ASSESSMENT — ENCOUNTER SYMPTOMS
VOMITING: 0
SHORTNESS OF BREATH: 0
DIARRHEA: 0
ABDOMINAL PAIN: 1
COUGH: 0
SORE THROAT: 0
RHINORRHEA: 0
CHEST TIGHTNESS: 0
NAUSEA: 0

## 2023-09-13 ASSESSMENT — PAIN - FUNCTIONAL ASSESSMENT: PAIN_FUNCTIONAL_ASSESSMENT: 0-10

## 2023-09-13 ASSESSMENT — PAIN DESCRIPTION - PAIN TYPE: TYPE: ACUTE PAIN

## 2023-09-13 ASSESSMENT — PAIN DESCRIPTION - FREQUENCY: FREQUENCY: INTERMITTENT

## 2023-09-13 ASSESSMENT — PAIN DESCRIPTION - ONSET: ONSET: GRADUAL

## 2023-09-14 ENCOUNTER — TELEPHONE (OUTPATIENT)
Dept: FAMILY MEDICINE CLINIC | Age: 53
End: 2023-09-14

## 2023-09-14 LAB
BACTERIA URNS QL MICRO: ABNORMAL /HPF
BILIRUB UR QL STRIP.AUTO: ABNORMAL
CASTS #/AREA URNS LPF: ABNORMAL /LPF
CASTS 2: ABNORMAL /LPF
CHARACTER UR: ABNORMAL
COLOR: ABNORMAL
CRYSTALS URNS MICRO: ABNORMAL
EPITHELIAL CELLS, UA: ABNORMAL /HPF
FERRITIN SERPL IA-MCNC: 255 NG/ML (ref 10–291)
GLUCOSE UR QL STRIP.AUTO: >= 1000 MG/DL
HGB UR QL STRIP.AUTO: ABNORMAL
KETONES UR QL STRIP.AUTO: NEGATIVE
MISCELLANEOUS 2: ABNORMAL
NITRITE UR QL STRIP: POSITIVE
PH UR STRIP.AUTO: 5.5 [PH] (ref 5–9)
PROT UR STRIP.AUTO-MCNC: 30 MG/DL
RBC URINE: ABNORMAL /HPF
RENAL EPI CELLS #/AREA URNS HPF: ABNORMAL /[HPF]
SP GR UR REFRACT.AUTO: > 1.03 (ref 1–1.03)
T4 FREE SERPL-MCNC: 1.34 NG/DL (ref 0.93–1.76)
TSH SERPL DL<=0.005 MIU/L-ACNC: 1.76 UIU/ML (ref 0.4–4.2)
UROBILINOGEN, URINE: 1 EU/DL (ref 0–1)
WBC #/AREA URNS HPF: ABNORMAL /HPF
WBC #/AREA URNS HPF: ABNORMAL /[HPF]
YEAST LIKE FUNGI URNS QL MICRO: ABNORMAL

## 2023-09-15 LAB
BACTERIA UR CULT: ABNORMAL
ORGANISM: ABNORMAL

## 2023-09-28 ENCOUNTER — HOSPITAL ENCOUNTER (OUTPATIENT)
Dept: MAMMOGRAPHY | Age: 53
Discharge: HOME | End: 2023-09-28
Payer: COMMERCIAL

## 2023-09-28 DIAGNOSIS — Z12.31 ENCOUNTER FOR SCREENING MAMMOGRAM FOR MALIGNANT NEOPLASM OF BREAST: ICD-10-CM

## 2023-09-28 DIAGNOSIS — Z00.00 WELL ADULT EXAM: ICD-10-CM

## 2023-09-28 PROCEDURE — 77063 BREAST TOMOSYNTHESIS BI: CPT

## 2023-09-29 ENCOUNTER — HOSPITAL ENCOUNTER (OUTPATIENT)
Dept: WOMENS IMAGING | Age: 53
Discharge: HOME | End: 2023-09-29
Attending: RADIOLOGY

## 2023-09-29 DIAGNOSIS — Z00.6 ENCOUNTER FOR EXAMINATION FOR NORMAL COMPARISON OR CONTROL IN CLINICAL RESEARCH PROGRAM: ICD-10-CM

## 2023-10-02 ENCOUNTER — TELEPHONE (OUTPATIENT)
Dept: FAMILY MEDICINE CLINIC | Age: 53
End: 2023-10-02

## 2023-10-02 NOTE — TELEPHONE ENCOUNTER
----- Message from Itzel Nash MD sent at 9/30/2023  8:33 AM EDT -----  Normal mammogram, continue yearly screenings.

## 2023-10-21 ENCOUNTER — APPOINTMENT (OUTPATIENT)
Dept: GENERAL RADIOLOGY | Age: 53
End: 2023-10-21
Payer: COMMERCIAL

## 2023-10-21 ENCOUNTER — APPOINTMENT (OUTPATIENT)
Dept: ULTRASOUND IMAGING | Age: 53
End: 2023-10-21
Payer: COMMERCIAL

## 2023-10-21 ENCOUNTER — HOSPITAL ENCOUNTER (OUTPATIENT)
Age: 53
Setting detail: OBSERVATION
Discharge: HOME | End: 2023-10-23
Attending: EMERGENCY MEDICINE | Admitting: INTERNAL MEDICINE
Payer: COMMERCIAL

## 2023-10-21 DIAGNOSIS — D72.810 LYMPHOPENIA: ICD-10-CM

## 2023-10-21 DIAGNOSIS — U07.1 COVID-19: Primary | ICD-10-CM

## 2023-10-21 PROBLEM — J12.82 PNEUMONIA DUE TO COVID-19 VIRUS: Status: ACTIVE | Noted: 2023-10-21

## 2023-10-21 LAB
ALBUMIN SERPL BCG-MCNC: 4.6 G/DL (ref 3.5–5.1)
ALP SERPL-CCNC: 234 U/L (ref 38–126)
ALT SERPL W/O P-5'-P-CCNC: 231 U/L (ref 11–66)
ANION GAP SERPL CALC-SCNC: 17 MEQ/L (ref 8–16)
AST SERPL-CCNC: 160 U/L (ref 5–40)
BASOPHILS ABSOLUTE: 0 THOU/MM3 (ref 0–0.1)
BASOPHILS NFR BLD AUTO: 0.7 %
BILIRUB SERPL-MCNC: 0.7 MG/DL (ref 0.3–1.2)
BILIRUB UR STRIP.AUTO-MCNC: ABNORMAL MG/DL
BUN SERPL-MCNC: 17 MG/DL (ref 7–22)
CALCIUM SERPL-MCNC: 9.9 MG/DL (ref 8.5–10.5)
CHARACTER UR: CLEAR
CHLORIDE SERPL-SCNC: 99 MEQ/L (ref 98–111)
CO2 SERPL-SCNC: 23 MEQ/L (ref 23–33)
COLOR: YELLOW
CREAT SERPL-MCNC: 1 MG/DL (ref 0.4–1.2)
DEPRECATED RDW RBC AUTO: 46.4 FL (ref 35–45)
DIGOXIN SERPL-MCNC: 0.8 NG/ML (ref 0.5–2)
EKG ATRIAL RATE: 84 BPM
EKG ATRIAL RATE: 95 BPM
EKG P AXIS: 63 DEGREES
EKG P AXIS: 63 DEGREES
EKG P-R INTERVAL: 130 MS
EKG P-R INTERVAL: 132 MS
EKG Q-T INTERVAL: 362 MS
EKG Q-T INTERVAL: 392 MS
EKG QRS DURATION: 110 MS
EKG QRS DURATION: 122 MS
EKG QTC CALCULATION (BAZETT): 454 MS
EKG QTC CALCULATION (BAZETT): 463 MS
EKG R AXIS: -34 DEGREES
EKG R AXIS: -40 DEGREES
EKG T AXIS: -156 DEGREES
EKG T AXIS: 88 DEGREES
EKG VENTRICULAR RATE: 84 BPM
EKG VENTRICULAR RATE: 95 BPM
EOSINOPHIL NFR BLD AUTO: 1.4 %
EOSINOPHILS ABSOLUTE: 0.1 THOU/MM3 (ref 0–0.4)
ERYTHROCYTE [DISTWIDTH] IN BLOOD BY AUTOMATED COUNT: 13.5 % (ref 11.5–14.5)
GFR SERPL CREATININE-BSD FRML MDRD: > 60 ML/MIN/1.73M2
GLUCOSE SERPL-MCNC: 103 MG/DL (ref 70–108)
GLUCOSE UR QL STRIP.AUTO: >= 1000 MG/DL
HCT VFR BLD AUTO: 50.5 % (ref 37–47)
HGB BLD-MCNC: 16.3 GM/DL (ref 12–16)
IMM GRANULOCYTES # BLD AUTO: 0.01 THOU/MM3 (ref 0–0.07)
IMM GRANULOCYTES NFR BLD AUTO: 0.2 %
KETONES UR QL STRIP.AUTO: ABNORMAL
LYMPHOCYTES ABSOLUTE: 1.3 THOU/MM3 (ref 1–4.8)
LYMPHOCYTES NFR BLD AUTO: 29.2 %
MAGNESIUM SERPL-MCNC: 2.1 MG/DL (ref 1.6–2.4)
MCH RBC QN AUTO: 30 PG (ref 26–33)
MCHC RBC AUTO-ENTMCNC: 32.3 GM/DL (ref 32.2–35.5)
MCV RBC AUTO: 93 FL (ref 81–99)
MONOCYTES ABSOLUTE: 0.6 THOU/MM3 (ref 0.4–1.3)
MONOCYTES NFR BLD AUTO: 14.2 %
NEUTROPHILS NFR BLD AUTO: 54.3 %
NITRITE UR QL STRIP.AUTO: NEGATIVE
NRBC BLD AUTO-RTO: 0 /100 WBC
NT-PROBNP SERPL IA-MCNC: 48.6 PG/ML (ref 0–124)
OSMOLALITY SERPL CALC.SUM OF ELEC: 279.3 MOSMOL/KG (ref 275–300)
PH UR STRIP.AUTO: 5.5 [PH] (ref 5–9)
PLATELET # BLD AUTO: 190 THOU/MM3 (ref 130–400)
PMV BLD AUTO: 10.6 FL (ref 9.4–12.4)
POTASSIUM SERPL-SCNC: 4 MEQ/L (ref 3.5–5.2)
PROCALCITONIN SERPL IA-MCNC: 0.14 NG/ML (ref 0.01–0.09)
PROT SERPL-MCNC: 8.1 G/DL (ref 6.1–8)
PROT UR STRIP.AUTO-MCNC: 30 MG/DL
RBC # BLD AUTO: 5.43 MILL/MM3 (ref 4.2–5.4)
RBC #/AREA URNS HPF: NEGATIVE /[HPF]
SARS-COV-2 RDRP RESP QL NAA+PROBE: DETECTED
SEGMENTED NEUTROPHILS ABSOLUTE COUNT: 2.3 THOU/MM3 (ref 1.8–7.7)
SODIUM SERPL-SCNC: 139 MEQ/L (ref 135–145)
SP GR UR STRIP.AUTO: 1.02 (ref 1–1.03)
TROPONIN, HIGH SENSITIVITY: 10 NG/L (ref 0–12)
TROPONIN, HIGH SENSITIVITY: 7 NG/L (ref 0–12)
UROBILINOGEN, URINE: 0.2 EU/DL (ref 0.2–1)
WBC # BLD AUTO: 4.3 THOU/MM3 (ref 4.8–10.8)
WBC #/AREA URNS HPF: NEGATIVE /[HPF]

## 2023-10-21 PROCEDURE — 87635 SARS-COV-2 COVID-19 AMP PRB: CPT

## 2023-10-21 PROCEDURE — 96360 HYDRATION IV INFUSION INIT: CPT

## 2023-10-21 PROCEDURE — 6360000002 HC RX W HCPCS: Performed by: STUDENT IN AN ORGANIZED HEALTH CARE EDUCATION/TRAINING PROGRAM

## 2023-10-21 PROCEDURE — 80162 ASSAY OF DIGOXIN TOTAL: CPT

## 2023-10-21 PROCEDURE — 6370000000 HC RX 637 (ALT 250 FOR IP): Performed by: STUDENT IN AN ORGANIZED HEALTH CARE EDUCATION/TRAINING PROGRAM

## 2023-10-21 PROCEDURE — 99285 EMERGENCY DEPT VISIT HI MDM: CPT

## 2023-10-21 PROCEDURE — 84484 ASSAY OF TROPONIN QUANT: CPT

## 2023-10-21 PROCEDURE — 99222 1ST HOSP IP/OBS MODERATE 55: CPT | Performed by: INTERNAL MEDICINE

## 2023-10-21 PROCEDURE — 93005 ELECTROCARDIOGRAM TRACING: CPT | Performed by: NURSE PRACTITIONER

## 2023-10-21 PROCEDURE — 81003 URINALYSIS AUTO W/O SCOPE: CPT

## 2023-10-21 PROCEDURE — G0378 HOSPITAL OBSERVATION PER HR: HCPCS

## 2023-10-21 PROCEDURE — 83735 ASSAY OF MAGNESIUM: CPT

## 2023-10-21 PROCEDURE — 71045 X-RAY EXAM CHEST 1 VIEW: CPT

## 2023-10-21 PROCEDURE — 85025 COMPLETE CBC W/AUTO DIFF WBC: CPT

## 2023-10-21 PROCEDURE — 93010 ELECTROCARDIOGRAM REPORT: CPT | Performed by: INTERNAL MEDICINE

## 2023-10-21 PROCEDURE — 2580000003 HC RX 258: Performed by: STUDENT IN AN ORGANIZED HEALTH CARE EDUCATION/TRAINING PROGRAM

## 2023-10-21 PROCEDURE — 96361 HYDRATE IV INFUSION ADD-ON: CPT

## 2023-10-21 PROCEDURE — 96372 THER/PROPH/DIAG INJ SC/IM: CPT

## 2023-10-21 PROCEDURE — 6370000000 HC RX 637 (ALT 250 FOR IP): Performed by: PHYSICIAN ASSISTANT

## 2023-10-21 PROCEDURE — 6370000000 HC RX 637 (ALT 250 FOR IP): Performed by: EMERGENCY MEDICINE

## 2023-10-21 PROCEDURE — 80053 COMPREHEN METABOLIC PANEL: CPT

## 2023-10-21 PROCEDURE — 93005 ELECTROCARDIOGRAM TRACING: CPT | Performed by: EMERGENCY MEDICINE

## 2023-10-21 PROCEDURE — 36415 COLL VENOUS BLD VENIPUNCTURE: CPT

## 2023-10-21 PROCEDURE — 83880 ASSAY OF NATRIURETIC PEPTIDE: CPT

## 2023-10-21 PROCEDURE — 99215 OFFICE O/P EST HI 40 MIN: CPT

## 2023-10-21 PROCEDURE — 2580000003 HC RX 258: Performed by: EMERGENCY MEDICINE

## 2023-10-21 PROCEDURE — 36000 PLACE NEEDLE IN VEIN: CPT

## 2023-10-21 PROCEDURE — 84145 PROCALCITONIN (PCT): CPT

## 2023-10-21 PROCEDURE — 76705 ECHO EXAM OF ABDOMEN: CPT

## 2023-10-21 RX ORDER — ZINC SULFATE 50(220)MG
50 CAPSULE ORAL DAILY
Status: DISCONTINUED | OUTPATIENT
Start: 2023-10-21 | End: 2023-10-23 | Stop reason: HOSPADM

## 2023-10-21 RX ORDER — SODIUM CHLORIDE 9 MG/ML
INJECTION, SOLUTION INTRAVENOUS PRN
Status: DISCONTINUED | OUTPATIENT
Start: 2023-10-21 | End: 2023-10-23 | Stop reason: HOSPADM

## 2023-10-21 RX ORDER — DIGOXIN 125 MCG
125 TABLET ORAL DAILY
Status: DISCONTINUED | OUTPATIENT
Start: 2023-10-22 | End: 2023-10-23 | Stop reason: HOSPADM

## 2023-10-21 RX ORDER — ONDANSETRON 2 MG/ML
4 INJECTION INTRAMUSCULAR; INTRAVENOUS EVERY 6 HOURS PRN
Status: DISCONTINUED | OUTPATIENT
Start: 2023-10-21 | End: 2023-10-23 | Stop reason: HOSPADM

## 2023-10-21 RX ORDER — IBUPROFEN 600 MG/1
1 TABLET ORAL PRN
Status: DISCONTINUED | OUTPATIENT
Start: 2023-10-21 | End: 2023-10-23 | Stop reason: HOSPADM

## 2023-10-21 RX ORDER — ACETAMINOPHEN 650 MG/1
650 SUPPOSITORY RECTAL EVERY 6 HOURS PRN
Status: DISCONTINUED | OUTPATIENT
Start: 2023-10-21 | End: 2023-10-23 | Stop reason: HOSPADM

## 2023-10-21 RX ORDER — SODIUM CHLORIDE, SODIUM LACTATE, POTASSIUM CHLORIDE, CALCIUM CHLORIDE 600; 310; 30; 20 MG/100ML; MG/100ML; MG/100ML; MG/100ML
INJECTION, SOLUTION INTRAVENOUS CONTINUOUS
Status: ACTIVE | OUTPATIENT
Start: 2023-10-21 | End: 2023-10-22

## 2023-10-21 RX ORDER — SODIUM CHLORIDE 0.9 % (FLUSH) 0.9 %
10 SYRINGE (ML) INJECTION EVERY 12 HOURS SCHEDULED
Status: DISCONTINUED | OUTPATIENT
Start: 2023-10-21 | End: 2023-10-23 | Stop reason: HOSPADM

## 2023-10-21 RX ORDER — 0.9 % SODIUM CHLORIDE 0.9 %
500 INTRAVENOUS SOLUTION INTRAVENOUS ONCE
Status: COMPLETED | OUTPATIENT
Start: 2023-10-21 | End: 2023-10-21

## 2023-10-21 RX ORDER — ASCORBIC ACID 500 MG
500 TABLET ORAL DAILY
Status: DISCONTINUED | OUTPATIENT
Start: 2023-10-21 | End: 2023-10-23 | Stop reason: HOSPADM

## 2023-10-21 RX ORDER — DEXTROSE MONOHYDRATE 100 MG/ML
INJECTION, SOLUTION INTRAVENOUS CONTINUOUS PRN
Status: DISCONTINUED | OUTPATIENT
Start: 2023-10-21 | End: 2023-10-23 | Stop reason: HOSPADM

## 2023-10-21 RX ORDER — ENOXAPARIN SODIUM 100 MG/ML
40 INJECTION SUBCUTANEOUS DAILY
Status: DISCONTINUED | OUTPATIENT
Start: 2023-10-21 | End: 2023-10-23 | Stop reason: HOSPADM

## 2023-10-21 RX ORDER — BENZONATATE 100 MG/1
100 CAPSULE ORAL 3 TIMES DAILY PRN
Status: DISCONTINUED | OUTPATIENT
Start: 2023-10-21 | End: 2023-10-23 | Stop reason: HOSPADM

## 2023-10-21 RX ORDER — ASPIRIN 81 MG/1
324 TABLET, CHEWABLE ORAL ONCE
Status: COMPLETED | OUTPATIENT
Start: 2023-10-21 | End: 2023-10-21

## 2023-10-21 RX ORDER — DOXEPIN HYDROCHLORIDE 10 MG/1
10 CAPSULE ORAL NIGHTLY PRN
Status: DISCONTINUED | OUTPATIENT
Start: 2023-10-21 | End: 2023-10-23 | Stop reason: HOSPADM

## 2023-10-21 RX ORDER — VITAMIN B COMPLEX
1000 TABLET ORAL DAILY
Status: DISCONTINUED | OUTPATIENT
Start: 2023-10-21 | End: 2023-10-23 | Stop reason: HOSPADM

## 2023-10-21 RX ORDER — ACETAMINOPHEN 325 MG/1
650 TABLET ORAL EVERY 6 HOURS PRN
Status: DISCONTINUED | OUTPATIENT
Start: 2023-10-21 | End: 2023-10-23 | Stop reason: HOSPADM

## 2023-10-21 RX ORDER — LEVOTHYROXINE SODIUM 0.07 MG/1
75 TABLET ORAL DAILY
Status: DISCONTINUED | OUTPATIENT
Start: 2023-10-22 | End: 2023-10-23 | Stop reason: HOSPADM

## 2023-10-21 RX ORDER — TRAMADOL HYDROCHLORIDE 50 MG/1
50 TABLET ORAL EVERY 6 HOURS PRN
Status: DISCONTINUED | OUTPATIENT
Start: 2023-10-21 | End: 2023-10-23 | Stop reason: HOSPADM

## 2023-10-21 RX ORDER — ONDANSETRON 4 MG/1
4 TABLET, ORALLY DISINTEGRATING ORAL EVERY 8 HOURS PRN
Status: DISCONTINUED | OUTPATIENT
Start: 2023-10-21 | End: 2023-10-23 | Stop reason: HOSPADM

## 2023-10-21 RX ORDER — SODIUM CHLORIDE 0.9 % (FLUSH) 0.9 %
10 SYRINGE (ML) INJECTION PRN
Status: DISCONTINUED | OUTPATIENT
Start: 2023-10-21 | End: 2023-10-23 | Stop reason: HOSPADM

## 2023-10-21 RX ORDER — POLYETHYLENE GLYCOL 3350 17 G/17G
17 POWDER, FOR SOLUTION ORAL DAILY PRN
Status: DISCONTINUED | OUTPATIENT
Start: 2023-10-21 | End: 2023-10-23 | Stop reason: HOSPADM

## 2023-10-21 RX ORDER — ZINC GLUCONATE 50 MG
50 TABLET ORAL DAILY
Status: DISCONTINUED | OUTPATIENT
Start: 2023-10-21 | End: 2023-10-21

## 2023-10-21 RX ORDER — FUROSEMIDE 20 MG/1
20 TABLET ORAL DAILY PRN
Status: DISCONTINUED | OUTPATIENT
Start: 2023-10-22 | End: 2023-10-23 | Stop reason: HOSPADM

## 2023-10-21 RX ORDER — VENLAFAXINE HYDROCHLORIDE 150 MG/1
150 CAPSULE, EXTENDED RELEASE ORAL
Status: DISCONTINUED | OUTPATIENT
Start: 2023-10-22 | End: 2023-10-23 | Stop reason: HOSPADM

## 2023-10-21 RX ADMIN — SACUBITRIL AND VALSARTAN 2 TABLET: 49; 51 TABLET, FILM COATED ORAL at 22:02

## 2023-10-21 RX ADMIN — Medication 50 MG: at 23:56

## 2023-10-21 RX ADMIN — ASPIRIN 81 MG CHEWABLE TABLET 324 MG: 81 TABLET CHEWABLE at 15:49

## 2023-10-21 RX ADMIN — SODIUM CHLORIDE 500 ML: 9 INJECTION, SOLUTION INTRAVENOUS at 17:01

## 2023-10-21 RX ADMIN — DOXEPIN HYDROCHLORIDE 10 MG: 10 CAPSULE ORAL at 23:56

## 2023-10-21 RX ADMIN — OXYCODONE HYDROCHLORIDE AND ACETAMINOPHEN 500 MG: 500 TABLET ORAL at 22:02

## 2023-10-21 RX ADMIN — ENOXAPARIN SODIUM 40 MG: 100 INJECTION SUBCUTANEOUS at 22:05

## 2023-10-21 RX ADMIN — SODIUM CHLORIDE, POTASSIUM CHLORIDE, SODIUM LACTATE AND CALCIUM CHLORIDE: 600; 310; 30; 20 INJECTION, SOLUTION INTRAVENOUS at 22:02

## 2023-10-21 RX ADMIN — Medication 1000 UNITS: at 22:02

## 2023-10-21 ASSESSMENT — PAIN - FUNCTIONAL ASSESSMENT
PAIN_FUNCTIONAL_ASSESSMENT: 0-10
PAIN_FUNCTIONAL_ASSESSMENT: INTOLERABLE, UNABLE TO DO ANY ACTIVE OR PASSIVE ACTIVITIES

## 2023-10-21 ASSESSMENT — ENCOUNTER SYMPTOMS
EYE DISCHARGE: 0
VOMITING: 0
SHORTNESS OF BREATH: 1
ALLERGIC/IMMUNOLOGIC NEGATIVE: 1
ABDOMINAL PAIN: 0
DIARRHEA: 0
CONSTIPATION: 0
SORE THROAT: 0
TROUBLE SWALLOWING: 0
BACK PAIN: 0
NAUSEA: 0
EYE PAIN: 0
WHEEZING: 0
COUGH: 1
EYE REDNESS: 0
RHINORRHEA: 1

## 2023-10-21 ASSESSMENT — PAIN DESCRIPTION - LOCATION: LOCATION: CHEST

## 2023-10-21 ASSESSMENT — PAIN DESCRIPTION - DESCRIPTORS: DESCRIPTORS: DISCOMFORT

## 2023-10-21 ASSESSMENT — PAIN SCALES - GENERAL: PAINLEVEL_OUTOF10: 6

## 2023-10-21 ASSESSMENT — PAIN DESCRIPTION - ONSET: ONSET: ON-GOING

## 2023-10-21 ASSESSMENT — PAIN DESCRIPTION - PAIN TYPE: TYPE: ACUTE PAIN

## 2023-10-21 ASSESSMENT — PAIN DESCRIPTION - FREQUENCY: FREQUENCY: CONTINUOUS

## 2023-10-21 NOTE — ED NOTES
Patient unable to tolerate standing for duration of orthostatic vital signs. Patient began to bend over and become more SOB than usual. Patient returned safely to bed. Patient begins to return to baseline breathing. Provider notified of findings.      Nazia Pena RN  10/21/23 8431

## 2023-10-21 NOTE — ED TRIAGE NOTES
Pt to urgent care due to SOB and Chest pain. Pt currently has CHF and wonders if she is in heart failure. Pt was brought back to room 4 where an EKG was completed and provider at bedside.

## 2023-10-21 NOTE — ED NOTES
This RN to the bedside for rounding. Patient updated on admit process. Patient denies further needs. Patient VSS. Respirations are easy and unlabored. Patient appears to be in no current distress at this time. Family at the bedside.        Dano Smith RN  10/21/23 3096

## 2023-10-21 NOTE — ED NOTES
Pt to the ED via EMS. Patient presents with complaints of chest pain after testing positive for COVID19. Patient states that she has been fatigued, especially during ambulation. EKG done, IV inserted. Patient is alert and oriented x 4. Respirations are regular and unlabored. Patient provided blanket. Call light within reach.      Andrew Stanley RN  10/21/23 1957

## 2023-10-21 NOTE — ED NOTES
Pt transported to [facility] on cart in stable condition. Floor contacted before transport and spoke with Carlos Chauhan.      Ty Ta  10/21/23 0133

## 2023-10-21 NOTE — H&P
Internal Medicine Resident History and Physical          Name: Sana Gipson, female, : 1970, MRN: [id number]    PCP: Paco Sanabria MD    Date of Admission: 10/21/2023  Date of Service: Pt seen/examined on 10/21/23  and Admitted to Observation with expected LOS less than two midnights due to medical therapy. Assessment and Plan:  COVID-19 pneumonia: P/w 3-day history of nonproductive cough, congestion, pleuritic chest pain, chills. Noted to have increasing work of breathing at home she was concerned for CHF exacerbation. CXR without any acute process. Afebrile, leukopenic she was found to be COVID-positive. Not requiring supplemental O2. Supportive care. Vitamin C/D/Zinc  Supplemental O2 as needed to Keep SPO2 greater than 90%  Gentle IVF - Noted to be orthostatic in ED  PCT pending  IS/Acapella/Tessalon Pearls  Further recommendations based on clinical course and findings  Pleuritic Chest pain: secondary to above. Reproducible on palpation. Troponin negative. EKG shows paced rhythm. Patient endorses anxiety because of her CHF diagnosis. Informed patient that we will trend her troponin and repeat EKG in the AM to help alleviate her anxiety. Mild Transaminitis: , , . Endorses mild abdominal discomfort. Suspect secondary to COVID-19 however will order RUQUS for further evaluation. Repeat hepatic panel tomorrow AM  Chronic HFrEF, NICM, NYHA I: S/p BiV ICD 2023. no exacerbation. Follows [facility]/Cardiology/CHF clinic. TTE 2023 shows EF 40%. Moderate global hypokinesis of the LV. CXR without evidence of pulmonary vascular congestion. No JVD, lower extremity edema.   Continue metoprolol with hold parameters  Continue Entresto, Jardiance, digoxin  Strict I&O's Daily weights  Na/Fluid restricted diet  Hypothyroid: TSH/FT4 1.760/1.34 Continue Synthroid  Anxiety/depression: Continue home

## 2023-10-22 PROBLEM — I95.1 ORTHOSTATIC HYPOTENSION: Status: ACTIVE | Noted: 2023-10-22

## 2023-10-22 PROBLEM — I42.8 NONISCHEMIC CARDIOMYOPATHY (HCC): Status: ACTIVE | Noted: 2023-10-22

## 2023-10-22 LAB
ALBUMIN SERPL BCG-MCNC: 3.8 G/DL (ref 3.5–5.1)
ALP SERPL-CCNC: 157 U/L (ref 38–126)
ALT SERPL W/O P-5'-P-CCNC: 133 U/L (ref 11–66)
ANION GAP SERPL CALC-SCNC: 11 MEQ/L (ref 8–16)
AST SERPL-CCNC: 75 U/L (ref 5–40)
BILIRUB CONJ SERPL-MCNC: < 0.2 MG/DL (ref 0–0.3)
BILIRUB SERPL-MCNC: 0.4 MG/DL (ref 0.3–1.2)
BUN SERPL-MCNC: 17 MG/DL (ref 7–22)
CALCIUM SERPL-MCNC: 8.8 MG/DL (ref 8.5–10.5)
CHLORIDE SERPL-SCNC: 103 MEQ/L (ref 98–111)
CO2 SERPL-SCNC: 25 MEQ/L (ref 23–33)
CREAT SERPL-MCNC: 0.8 MG/DL (ref 0.4–1.2)
DEPRECATED RDW RBC AUTO: 48.4 FL (ref 35–45)
ERYTHROCYTE [DISTWIDTH] IN BLOOD BY AUTOMATED COUNT: 13.5 % (ref 11.5–14.5)
GFR SERPL CREATININE-BSD FRML MDRD: > 60 ML/MIN/1.73M2
GLUCOSE SERPL-MCNC: 88 MG/DL (ref 70–108)
HCT VFR BLD AUTO: 43.8 % (ref 37–47)
HGB BLD-MCNC: 14 GM/DL (ref 12–16)
MAGNESIUM SERPL-MCNC: 2.1 MG/DL (ref 1.6–2.4)
MCH RBC QN AUTO: 30.8 PG (ref 26–33)
MCHC RBC AUTO-ENTMCNC: 32 GM/DL (ref 32.2–35.5)
MCV RBC AUTO: 96.5 FL (ref 81–99)
PLATELET # BLD AUTO: 153 THOU/MM3 (ref 130–400)
PMV BLD AUTO: 11.3 FL (ref 9.4–12.4)
POTASSIUM SERPL-SCNC: 3.8 MEQ/L (ref 3.5–5.2)
PROT SERPL-MCNC: 6.3 G/DL (ref 6.1–8)
RBC # BLD AUTO: 4.54 MILL/MM3 (ref 4.2–5.4)
SODIUM SERPL-SCNC: 139 MEQ/L (ref 135–145)
TROPONIN, HIGH SENSITIVITY: 7 NG/L (ref 0–12)
TROPONIN, HIGH SENSITIVITY: 8 NG/L (ref 0–12)
WBC # BLD AUTO: 3.9 THOU/MM3 (ref 4.8–10.8)

## 2023-10-22 PROCEDURE — 96361 HYDRATE IV INFUSION ADD-ON: CPT

## 2023-10-22 PROCEDURE — 83735 ASSAY OF MAGNESIUM: CPT

## 2023-10-22 PROCEDURE — 2580000003 HC RX 258: Performed by: STUDENT IN AN ORGANIZED HEALTH CARE EDUCATION/TRAINING PROGRAM

## 2023-10-22 PROCEDURE — 94669 MECHANICAL CHEST WALL OSCILL: CPT

## 2023-10-22 PROCEDURE — 96372 THER/PROPH/DIAG INJ SC/IM: CPT

## 2023-10-22 PROCEDURE — 82248 BILIRUBIN DIRECT: CPT

## 2023-10-22 PROCEDURE — 36415 COLL VENOUS BLD VENIPUNCTURE: CPT

## 2023-10-22 PROCEDURE — 93005 ELECTROCARDIOGRAM TRACING: CPT | Performed by: STUDENT IN AN ORGANIZED HEALTH CARE EDUCATION/TRAINING PROGRAM

## 2023-10-22 PROCEDURE — G0378 HOSPITAL OBSERVATION PER HR: HCPCS

## 2023-10-22 PROCEDURE — 6370000000 HC RX 637 (ALT 250 FOR IP): Performed by: PHYSICIAN ASSISTANT

## 2023-10-22 PROCEDURE — 85027 COMPLETE CBC AUTOMATED: CPT

## 2023-10-22 PROCEDURE — 99232 SBSQ HOSP IP/OBS MODERATE 35: CPT | Performed by: INTERNAL MEDICINE

## 2023-10-22 PROCEDURE — 80053 COMPREHEN METABOLIC PANEL: CPT

## 2023-10-22 PROCEDURE — 6370000000 HC RX 637 (ALT 250 FOR IP): Performed by: STUDENT IN AN ORGANIZED HEALTH CARE EDUCATION/TRAINING PROGRAM

## 2023-10-22 PROCEDURE — 6360000002 HC RX W HCPCS: Performed by: STUDENT IN AN ORGANIZED HEALTH CARE EDUCATION/TRAINING PROGRAM

## 2023-10-22 PROCEDURE — 93010 ELECTROCARDIOGRAM REPORT: CPT | Performed by: INTERNAL MEDICINE

## 2023-10-22 PROCEDURE — 6370000000 HC RX 637 (ALT 250 FOR IP): Performed by: INTERNAL MEDICINE

## 2023-10-22 PROCEDURE — 84484 ASSAY OF TROPONIN QUANT: CPT

## 2023-10-22 RX ADMIN — TRAMADOL HYDROCHLORIDE 50 MG: 50 TABLET, COATED ORAL at 16:14

## 2023-10-22 RX ADMIN — POTASSIUM BICARBONATE 40 MEQ: 782 TABLET, EFFERVESCENT ORAL at 10:31

## 2023-10-22 RX ADMIN — EMPAGLIFLOZIN 10 MG: 10 TABLET, FILM COATED ORAL at 12:31

## 2023-10-22 RX ADMIN — SACUBITRIL AND VALSARTAN 2 TABLET: 49; 51 TABLET, FILM COATED ORAL at 12:31

## 2023-10-22 RX ADMIN — ENOXAPARIN SODIUM 40 MG: 100 INJECTION SUBCUTANEOUS at 10:33

## 2023-10-22 RX ADMIN — SODIUM CHLORIDE, PRESERVATIVE FREE 10 ML: 5 INJECTION INTRAVENOUS at 20:40

## 2023-10-22 RX ADMIN — METOPROLOL SUCCINATE 150 MG: 100 TABLET, EXTENDED RELEASE ORAL at 10:32

## 2023-10-22 RX ADMIN — SACUBITRIL AND VALSARTAN 2 TABLET: 49; 51 TABLET, FILM COATED ORAL at 20:40

## 2023-10-22 RX ADMIN — DOXEPIN HYDROCHLORIDE 10 MG: 10 CAPSULE ORAL at 20:40

## 2023-10-22 RX ADMIN — LEVOTHYROXINE SODIUM 75 MCG: 0.07 TABLET ORAL at 05:02

## 2023-10-22 RX ADMIN — OXYCODONE HYDROCHLORIDE AND ACETAMINOPHEN 500 MG: 500 TABLET ORAL at 10:33

## 2023-10-22 RX ADMIN — DIGOXIN 125 MCG: 0.12 TABLET ORAL at 10:33

## 2023-10-22 RX ADMIN — VENLAFAXINE HYDROCHLORIDE 150 MG: 150 CAPSULE, EXTENDED RELEASE ORAL at 10:36

## 2023-10-22 RX ADMIN — Medication 1000 UNITS: at 10:32

## 2023-10-22 RX ADMIN — Medication 50 MG: at 10:32

## 2023-10-22 ASSESSMENT — PAIN - FUNCTIONAL ASSESSMENT: PAIN_FUNCTIONAL_ASSESSMENT: PREVENTS OR INTERFERES SOME ACTIVE ACTIVITIES AND ADLS

## 2023-10-22 ASSESSMENT — PAIN DESCRIPTION - LOCATION: LOCATION: HEAD

## 2023-10-22 ASSESSMENT — PAIN SCALES - GENERAL
PAINLEVEL_OUTOF10: 3
PAINLEVEL_OUTOF10: 7

## 2023-10-22 ASSESSMENT — PAIN DESCRIPTION - DESCRIPTORS: DESCRIPTORS: DULL;NAGGING;ACHING

## 2023-10-22 ASSESSMENT — PAIN DESCRIPTION - ORIENTATION: ORIENTATION: MID

## 2023-10-22 NOTE — FLOWSHEET NOTE
10/22/23 1010   Safe Environment   Safety Measures Bed/Chair-Wheels locked; Bed in low position;Side rails X 3  (Virtual Nurse Safety Round Complete)     Call placed to patients room, patient responds to audio, permitted video. Patient alert and oriented. Patient denied any concerns/complaints at this time. Patient educated on utilizing call light. Call light within reach, no signs or symtpoms of distress noted.

## 2023-10-22 NOTE — PLAN OF CARE
Problem: Discharge Planning  Goal: Discharge to home or other facility with appropriate resources  Outcome: Progressing  Flowsheets (Taken 10/22/2023 0442)  Discharge to home or other facility with appropriate resources:   Identify barriers to discharge with patient and caregiver   Arrange for needed discharge resources and transportation as appropriate   Identify discharge learning needs (meds, wound care, etc)     Problem: Pain  Goal: Verbalizes/displays adequate comfort level or baseline comfort level  Outcome: Progressing  Flowsheets (Taken 10/22/2023 0442)  Verbalizes/displays adequate comfort level or baseline comfort level:   Encourage patient to monitor pain and request assistance   Assess pain using appropriate pain scale   Administer analgesics based on type and severity of pain and evaluate response     Problem: Safety - Adult  Goal: Free from fall injury  Outcome: Progressing  Flowsheets (Taken 10/22/2023 0442)  Free From Fall Injury: Instruct family/caregiver on patient safety     Problem: Respiratory - Adult  Goal: Achieves optimal ventilation and oxygenation  Outcome: Progressing  Flowsheets (Taken 10/22/2023 0442)  Achieves optimal ventilation and oxygenation:   Assess for changes in respiratory status   Assess for changes in mentation and behavior   Assess and instruct to report shortness of breath or any respiratory difficulty     Problem: Cardiovascular - Adult  Goal: Maintains optimal cardiac output and hemodynamic stability  Outcome: Progressing  Flowsheets (Taken 10/22/2023 0442)  Maintains optimal cardiac output and hemodynamic stability:   Monitor blood pressure and heart rate   Monitor urine output and notify Licensed Independent Practitioner for values outside of normal range     Problem: Cardiovascular - Adult  Goal: Absence of cardiac dysrhythmias or at baseline  Outcome: Progressing  Flowsheets (Taken 10/22/2023 0442)  Absence of cardiac dysrhythmias or at baseline:   Monitor cardiac

## 2023-10-22 NOTE — FLOWSHEET NOTE
10/22/23 1256   Safe Environment   Safety Measures Bed in low position;Call light within reach; Side rails X 2  (Virtual Nurse Safety Round Complete)     Call placed to patients room, patient did not respond to audio, requested permission to turn on camera multiple times without response. Camera turned on for safety check. Patient resting eyes closed, in no visible distress. Call light within reach, no signs or symptoms of distress noted.

## 2023-10-23 VITALS
BODY MASS INDEX: 26.86 KG/M2 | HEART RATE: 73 BPM | HEIGHT: 62 IN | WEIGHT: 145.94 LBS | RESPIRATION RATE: 18 BRPM | SYSTOLIC BLOOD PRESSURE: 127 MMHG | DIASTOLIC BLOOD PRESSURE: 75 MMHG | TEMPERATURE: 97.6 F | OXYGEN SATURATION: 97 %

## 2023-10-23 PROBLEM — I95.1 ORTHOSTATIC HYPOTENSION: Status: RESOLVED | Noted: 2023-10-22 | Resolved: 2023-10-23

## 2023-10-23 PROBLEM — D72.810 LYMPHOPENIA: Status: ACTIVE | Noted: 2023-10-23

## 2023-10-23 LAB
ANION GAP SERPL CALC-SCNC: 11 MEQ/L (ref 8–16)
BASOPHILS ABSOLUTE: 0 THOU/MM3 (ref 0–0.1)
BASOPHILS NFR BLD AUTO: 1.2 %
BUN SERPL-MCNC: 16 MG/DL (ref 7–22)
CALCIUM SERPL-MCNC: 9.1 MG/DL (ref 8.5–10.5)
CHLORIDE SERPL-SCNC: 106 MEQ/L (ref 98–111)
CO2 SERPL-SCNC: 26 MEQ/L (ref 23–33)
CREAT SERPL-MCNC: 0.8 MG/DL (ref 0.4–1.2)
DEPRECATED RDW RBC AUTO: 48.2 FL (ref 35–45)
EOSINOPHIL NFR BLD AUTO: 4.3 %
EOSINOPHILS ABSOLUTE: 0.1 THOU/MM3 (ref 0–0.4)
ERYTHROCYTE [DISTWIDTH] IN BLOOD BY AUTOMATED COUNT: 13.2 % (ref 11.5–14.5)
GFR SERPL CREATININE-BSD FRML MDRD: > 60 ML/MIN/1.73M2
GLUCOSE SERPL-MCNC: 96 MG/DL (ref 70–108)
HCT VFR BLD AUTO: 47.3 % (ref 37–47)
HGB BLD-MCNC: 14.8 GM/DL (ref 12–16)
IMM GRANULOCYTES # BLD AUTO: 0.01 THOU/MM3 (ref 0–0.07)
IMM GRANULOCYTES NFR BLD AUTO: 0.3 %
LYMPHOCYTES ABSOLUTE: 1.7 THOU/MM3 (ref 1–4.8)
LYMPHOCYTES NFR BLD AUTO: 52.3 %
MAGNESIUM SERPL-MCNC: 2.3 MG/DL (ref 1.6–2.4)
MCH RBC QN AUTO: 30.4 PG (ref 26–33)
MCHC RBC AUTO-ENTMCNC: 31.3 GM/DL (ref 32.2–35.5)
MCV RBC AUTO: 97.1 FL (ref 81–99)
MONOCYTES ABSOLUTE: 0.4 THOU/MM3 (ref 0.4–1.3)
MONOCYTES NFR BLD AUTO: 13.1 %
NEUTROPHILS NFR BLD AUTO: 28.8 %
NRBC BLD AUTO-RTO: 0 /100 WBC
PLATELET # BLD AUTO: 164 THOU/MM3 (ref 130–400)
PLATELET BLD QL SMEAR: ADEQUATE
PMV BLD AUTO: 10.7 FL (ref 9.4–12.4)
POTASSIUM SERPL-SCNC: 4.5 MEQ/L (ref 3.5–5.2)
RBC # BLD AUTO: 4.87 MILL/MM3 (ref 4.2–5.4)
SCAN OF BLOOD SMEAR: NORMAL
SEGMENTED NEUTROPHILS ABSOLUTE COUNT: 1 THOU/MM3 (ref 1.8–7.7)
SODIUM SERPL-SCNC: 143 MEQ/L (ref 135–145)
VARIANT LYMPHS BLD QL SMEAR: ABNORMAL %
WBC # BLD AUTO: 3.3 THOU/MM3 (ref 4.8–10.8)

## 2023-10-23 PROCEDURE — 96372 THER/PROPH/DIAG INJ SC/IM: CPT

## 2023-10-23 PROCEDURE — 99239 HOSP IP/OBS DSCHRG MGMT >30: CPT | Performed by: INTERNAL MEDICINE

## 2023-10-23 PROCEDURE — 36415 COLL VENOUS BLD VENIPUNCTURE: CPT

## 2023-10-23 PROCEDURE — 80048 BASIC METABOLIC PNL TOTAL CA: CPT

## 2023-10-23 PROCEDURE — 2580000003 HC RX 258: Performed by: STUDENT IN AN ORGANIZED HEALTH CARE EDUCATION/TRAINING PROGRAM

## 2023-10-23 PROCEDURE — 85027 COMPLETE CBC AUTOMATED: CPT

## 2023-10-23 PROCEDURE — G0378 HOSPITAL OBSERVATION PER HR: HCPCS

## 2023-10-23 PROCEDURE — 83735 ASSAY OF MAGNESIUM: CPT

## 2023-10-23 PROCEDURE — 6360000002 HC RX W HCPCS: Performed by: STUDENT IN AN ORGANIZED HEALTH CARE EDUCATION/TRAINING PROGRAM

## 2023-10-23 PROCEDURE — 6370000000 HC RX 637 (ALT 250 FOR IP): Performed by: STUDENT IN AN ORGANIZED HEALTH CARE EDUCATION/TRAINING PROGRAM

## 2023-10-23 RX ADMIN — LEVOTHYROXINE SODIUM 75 MCG: 0.07 TABLET ORAL at 05:07

## 2023-10-23 RX ADMIN — EMPAGLIFLOZIN 10 MG: 10 TABLET, FILM COATED ORAL at 09:04

## 2023-10-23 RX ADMIN — VENLAFAXINE HYDROCHLORIDE 150 MG: 150 CAPSULE, EXTENDED RELEASE ORAL at 09:03

## 2023-10-23 RX ADMIN — SODIUM CHLORIDE, PRESERVATIVE FREE 10 ML: 5 INJECTION INTRAVENOUS at 09:03

## 2023-10-23 RX ADMIN — SACUBITRIL AND VALSARTAN 2 TABLET: 49; 51 TABLET, FILM COATED ORAL at 09:03

## 2023-10-23 RX ADMIN — METOPROLOL SUCCINATE 150 MG: 100 TABLET, EXTENDED RELEASE ORAL at 09:04

## 2023-10-23 RX ADMIN — OXYCODONE HYDROCHLORIDE AND ACETAMINOPHEN 500 MG: 500 TABLET ORAL at 09:04

## 2023-10-23 RX ADMIN — DIGOXIN 125 MCG: 0.12 TABLET ORAL at 09:04

## 2023-10-23 RX ADMIN — ENOXAPARIN SODIUM 40 MG: 100 INJECTION SUBCUTANEOUS at 09:03

## 2023-10-23 RX ADMIN — Medication 1000 UNITS: at 09:04

## 2023-10-23 RX ADMIN — Medication 50 MG: at 09:04

## 2023-10-23 NOTE — CARE COORDINATION
needed at discharge: N/A            Potential DME:    Patient expects to discharge to: [address] [facility] for transportation at discharge:      Financial    Payor: ELAN / Plan: [place] PPO / Product Type: *No Product type* /     Does insurance require precert for SNF: Yes    Potential assistance Purchasing Medications:    Meds-to-Beds request: Yes      [facility] [id number] [facility] [place] [id number] - [address] [facility], [facility],[address] [facility]  [address] [place] [address]  Phone: [phone] Fax: [phone]    [address] [phone] [place] Runner [phone]  [place] [address]  Phone: [phone] Fax: [phone]      Notes:    Factors facilitating achievement of predicted outcomes: Family support, Cooperative, and Pleasant    Barriers to discharge: Medical complications    Additional Case Management Notes: PCT 0.14, AST 75. Afebrile, no oxygen required. Anticipate discharge today. Procedure: none    The Plan for Transition of Care is related to the following treatment goals of Pneumonia due to COVID-19 virus [U07.1, J12.82]  COVID-19 [U07.1]    Patient Goals/Plan/Treatment Preferences: Met with Lisa, she plans to return home with her . She is independent, [facility] not [facility] or [facility]. She denies all discharge needs. Transportation/Food Security/Housekeeping Addressed: No issues identified.      Sherryle Salvo, RN  Case Management Department

## 2023-10-23 NOTE — DISCHARGE SUMMARY
Hospitalist Discharge Summary        Patient: Abhinav Caldwell  YOB: 1970  MRN: [id number]   Acct: [de-identified]    Primary Care Physician: Re Cook MD    Admit date  10/21/2023    Discharge date: 10/23/2023     Chief Complaint on presentation :-  hypotension, fatigue    Discharge Assessment and Plan:-   COVID-19 infection  Hypotension, malaise  Patient presented to [facility] for evaluation of malaise, fatigue. Found to be COVID positive. Orthostatic vitals positive  Patient started on fluid resuscitation with improvement in symptoms. No O2 requirement  Patient monitored, remained hemodynamically stable. Afebrile. Discharged for close PCP f/u    Leukopenia  Patient noted to have progressive leukopenia during admission, WBC 3.3 by time of discharge  Likely secondary to viral infection. Mild neutropenia noted - ANC 1000  Remains afebrile  No indication for GCSF or abx at this time  Outpatient CBC ordered for PCP to follow up       Admission Shari Ching is a 47 y.o. female with past medical history described below who presents to [facility] with shortness of breath. She complains of shortness of breath, malaise, fatigue, chest discomfort for the last 3 days. Cough is nonproductive and without fever. She has a history of HFrEF and out of concern for her CHF she went to urgent care for further evaluation and management. At urgent care she was noted to be COVID-positive. Per report patient was extremely anxious and requested to go to the ED for further evaluation. Work-up in the ED was largely unrevealing. No electrolyte abnormalities. Troponin was negative and BNP was negative. EKG showed paced rhythm. She was afebrile. Slightly leukopenic. Mild elevation of LFTs noted. UA without evidence of infection. COVID-19 noted to be positive. Is not requiring any supplemental oxygen.   She was admitted to [facility] for further evaluation and management    [address] [place] [address]

## 2023-10-23 NOTE — PLAN OF CARE
Problem: Discharge Planning  Goal: Discharge to home or other facility with appropriate resources  Outcome: Adequate for Discharge   Patient plans to be discharged to home.

## 2023-10-23 NOTE — PROGRESS NOTES
AVS completed for discharge by virtual nurse.
Pt admitted to  6K4 from ED. Complaints: Shortness of breath. IV site free of s/s of infection or infiltration. Vital signs obtained. Oriented to room. Policies and procedures for 6K explained. José Underwood RN discussed hourly rounding with patient addressing 5 P's. Fall prevention and safety brochure discussed with patient. Bed alarm on. Call light in reach.
This patient requested to not have her vital signs taken at 12 MN so she could have rest.
This virtual nurse called into patients room to complete admission documentation. VRN obtained consent to turn camera on; introduced self, intent and explained virtual services; patient is agreeable to move forward with admission at this time. All admission questions answered without issue. Medication list updated as per pt report. Pt agreeable to virtual services going forward. Pt verbalized understanding of all educational information provided. No acute distress noted at this time. Bed is in lowest position with call light within reach. Primary RN notified.
Betsy Harden is a 47 y.o. female with past medical history described below who presents to [facility] [place] [address] with shortness of breath. She complains of shortness of breath, malaise, fatigue, chest discomfort for the last 3 days. Cough is nonproductive and without fever. She has a history of HFrEF and out of concern for her CHF she went to urgent care for further evaluation and management. At urgent care she was noted to be COVID-positive. Per report patient was extremely anxious and requested to go to the ED for further evaluation. Work-up in the ED was largely unrevealing. No electrolyte abnormalities. Troponin was negative and BNP was negative. EKG showed paced rhythm. She was afebrile. Slightly leukopenic. Mild elevation of LFTs noted. UA without evidence of infection. COVID-19 noted to be positive. Is not requiring any supplemental oxygen. She was admitted to [facility] for further evaluation and management. Subjective (past 24 hours):   No adverse events overnight. Patient states that she feels much better, has more energy and does not feel as weak. Denies any lightheadedness or dizziness currently. Chest pain has resolved completely. No dyspnea or leg swelling. Past medical history, family history, social history and allergies reviewed again and is unchanged since admission. ROS (All review of systems completed. Pertinent positives noted.  Otherwise All other systems reviewed and negative.)     Medications:  Reviewed    Infusion Medications    sodium chloride      lactated ringers IV soln 75 mL/hr at 10/21/23 2202    dextrose       Scheduled Medications    digoxin  125 mcg Oral Daily    [Held by provider] empagliflozin  10 mg Oral Daily    levothyroxine  75 mcg Oral Daily    metoprolol succinate  150 mg Oral Daily    [Held by provider] sacubitril-valsartan  2 tablet Oral BID    venlafaxine  150 mg Oral Daily with breakfast    sodium chloride flush  10 mL IntraVENous 2 times per day

## 2023-10-23 NOTE — FLOWSHEET NOTE
10/23/23 1012   Safe Environment   Safety Measures Bed in low position;Call light within reach; Side rails X 2  (Virtual Nurse Safety Round Complete)     Call placed to patients room, patient responds to audio, permitted video. Patient alert and oriented. Patient denied any concerns/complaints at this time. Patient informed VRN that per MD she would likely be discharged with an hour or two. Patient educated on utilizing call light. Call light within reach, no signs or symptoms of distress noted.

## 2023-10-24 ENCOUNTER — CARE COORDINATION (OUTPATIENT)
Dept: CASE MANAGEMENT | Age: 53
End: 2023-10-24

## 2023-10-24 NOTE — CARE COORDINATION
Care Transitions Initial Outreach Attempt-1st attempt    Call within 2 business days of discharge: Yes   Attempted initial 24 hour transitional call to patient. Left HIPPA compliant VM to return call directly to [phone]. Patient: Iris Sanabria Patient : 1970 MRN: [id number]    Last Discharge Facility       Date Complaint Diagnosis Description Type Department Provider    10/21/23 Shortness of Breath COVID-19 . .. ED to Hosp-Admission (Discharged) (ADMITTED) Alireza Cary, DO; Sree Barroso. ..               Noted following upcoming appointments from discharge chart review:   [id number] follow up appointment(s):   Future Appointments   Date Time Provider [address]   2023  1:00 PM DILIP Lima - CNP N SRPX CHF [facility] - [facility]   2024 11:00 AM SCHEDULE, SRPX PACER NURSE N SRPX PACER [facility] - [facility]   2024 11:30 AM Chelo Garza MD N SRPX Heart [facility] - [facility]     Non-[facility]  follow up appointment(s): na

## 2023-10-25 ENCOUNTER — CARE COORDINATION (OUTPATIENT)
Dept: CASE MANAGEMENT | Age: 53
End: 2023-10-25

## 2023-10-25 NOTE — CARE COORDINATION
Care Transitions Initial Outreach Attempt-2nd attempt    Call within 2 business days of discharge: Yes   Attempted initial 24 hour transitional call to patient. Left HIPPA compliant VM to return call directly to [phone]. Unable to reach letter sent via [address] [place] [address]. If no return call, CTN will sign off-2nd attempt. Patient: Nichole Jauregui Patient : 1970 MRN: [id number]    Last Discharge Facility       Date Complaint Diagnosis Description Type Department Provider    10/21/23 Shortness of Breath COVID-19 . .. ED to Hosp-Admission (Discharged) (ADMITTED) [facility] Alireza Choudhary, ; Kathe Wood. .. Challenges to be reviewed by the provider   Additional needs identified to be addressed with provider Yes  I have attempted to call Lisa yesterday and today. Needs 7 day HFU, discharged 10/23, Covid. If no return call, CTN will sign off.                    Noted following upcoming appointments from discharge chart review:   [facility] follow up appointment(s):   Future Appointments   Date Time Provider [address]  [address]   2023  1:00 PM DILIP Stein - CNP N SRPX CHF P - [place]   2024 11:00 AM SCHEDULE, SRPX PACER NURSE N SRPX PACER P - [place]   2024 11:30 AM Clyde Perera MD N SRPX Heart P - [place]     Non-[facility]  follow up appointment(s): mike

## 2023-10-26 LAB
EKG ATRIAL RATE: 65 BPM
EKG P AXIS: 46 DEGREES
EKG P-R INTERVAL: 136 MS
EKG Q-T INTERVAL: 490 MS
EKG QRS DURATION: 144 MS
EKG QTC CALCULATION (BAZETT): 509 MS
EKG R AXIS: -40 DEGREES
EKG T AXIS: 60 DEGREES
EKG VENTRICULAR RATE: 65 BPM

## 2023-10-30 RX ORDER — METOPROLOL SUCCINATE 50 MG/1
TABLET, EXTENDED RELEASE ORAL
Qty: 90 TABLET | Refills: 2 | Status: SHIPPED | OUTPATIENT
Start: 2023-10-30

## 2023-11-01 ENCOUNTER — HOSPITAL ENCOUNTER (OUTPATIENT)
Age: 53
Discharge: HOME | End: 2023-11-01
Payer: COMMERCIAL

## 2023-11-01 DIAGNOSIS — D72.810 LYMPHOPENIA: ICD-10-CM

## 2023-11-01 DIAGNOSIS — U07.1 COVID-19: ICD-10-CM

## 2023-11-01 LAB
BASOPHILS ABSOLUTE: 0.1 THOU/MM3 (ref 0–0.1)
BASOPHILS NFR BLD AUTO: 1 %
DEPRECATED RDW RBC AUTO: 45.8 FL (ref 35–45)
EOSINOPHIL NFR BLD AUTO: 1.4 %
EOSINOPHILS ABSOLUTE: 0.1 THOU/MM3 (ref 0–0.4)
ERYTHROCYTE [DISTWIDTH] IN BLOOD BY AUTOMATED COUNT: 12.9 % (ref 11.5–14.5)
HCT VFR BLD AUTO: 47.7 % (ref 37–47)
HGB BLD-MCNC: 15 GM/DL (ref 12–16)
IMM GRANULOCYTES # BLD AUTO: 0.02 THOU/MM3 (ref 0–0.07)
IMM GRANULOCYTES NFR BLD AUTO: 0.3 %
LYMPHOCYTES ABSOLUTE: 2 THOU/MM3 (ref 1–4.8)
LYMPHOCYTES NFR BLD AUTO: 32.3 %
MCH RBC QN AUTO: 30.7 PG (ref 26–33)
MCHC RBC AUTO-ENTMCNC: 31.4 GM/DL (ref 32.2–35.5)
MCV RBC AUTO: 97.5 FL (ref 81–99)
MONOCYTES ABSOLUTE: 0.9 THOU/MM3 (ref 0.4–1.3)
MONOCYTES NFR BLD AUTO: 15 %
NEUTROPHILS NFR BLD AUTO: 50 %
NRBC BLD AUTO-RTO: 0 /100 WBC
PLATELET # BLD AUTO: 259 THOU/MM3 (ref 130–400)
PMV BLD AUTO: 11.5 FL (ref 9.4–12.4)
RBC # BLD AUTO: 4.89 MILL/MM3 (ref 4.2–5.4)
SEGMENTED NEUTROPHILS ABSOLUTE COUNT: 3.2 THOU/MM3 (ref 1.8–7.7)
WBC # BLD AUTO: 6.3 THOU/MM3 (ref 4.8–10.8)

## 2023-11-01 PROCEDURE — 36415 COLL VENOUS BLD VENIPUNCTURE: CPT

## 2023-11-01 PROCEDURE — 85025 COMPLETE CBC W/AUTO DIFF WBC: CPT

## 2023-11-06 ENCOUNTER — TELEPHONE (OUTPATIENT)
Dept: CARDIOLOGY CLINIC | Age: 53
End: 2023-11-06

## 2023-11-06 NOTE — TELEPHONE ENCOUNTER
Unscheduled Luke Sci BiV ICD  Pt of Marcelo    ATR episodes -- No AF, actually Sinus tach, atrial rates 150 -- mode switch rate set at 150  Max tracking rate 130. Will adjust next time she is in office. Appt moved up 12/11/23 after CHF appt.

## 2023-11-28 ENCOUNTER — PROCEDURE VISIT (OUTPATIENT)
Dept: CARDIOLOGY CLINIC | Age: 53
End: 2023-11-28
Payer: COMMERCIAL

## 2023-11-28 DIAGNOSIS — Z95.810 ICD (IMPLANTABLE CARDIOVERTER-DEFIBRILLATOR) IN PLACE: Primary | ICD-10-CM

## 2023-11-28 PROCEDURE — 93295 DEV INTERROG REMOTE 1/2/MLT: CPT | Performed by: INTERNAL MEDICINE

## 2023-11-28 PROCEDURE — 93296 REM INTERROG EVL PM/IDS: CPT | Performed by: INTERNAL MEDICINE

## 2023-11-28 NOTE — PROGRESS NOTES
DR IYER PT   NXT BOSTON SCI BIV ICD REMOTE     BATTERY 8 YRS REMAINING      A PACED 28%  BV PACED 99%      P WAVES 4.7  RV WAVES NOT OBTAINED PER THE DEVICE   LT WAVES NOT OBTAINED PER THE DEVICE    ATRIAL IMPEPEDENCE 649  RV IMPEDENCE 394  LV IMPEDENCE 936    SHOCK IMPEDENCE 84  HEART LOGIC 0

## 2023-12-11 ENCOUNTER — NURSE ONLY (OUTPATIENT)
Dept: CARDIOLOGY CLINIC | Age: 53
End: 2023-12-11

## 2023-12-11 ENCOUNTER — OFFICE VISIT (OUTPATIENT)
Dept: CARDIOLOGY CLINIC | Age: 53
End: 2023-12-11
Payer: COMMERCIAL

## 2023-12-11 VITALS
DIASTOLIC BLOOD PRESSURE: 86 MMHG | WEIGHT: 149 LBS | SYSTOLIC BLOOD PRESSURE: 126 MMHG | HEART RATE: 68 BPM | HEIGHT: 62 IN | BODY MASS INDEX: 27.42 KG/M2

## 2023-12-11 DIAGNOSIS — Z95.810 ICD (IMPLANTABLE CARDIOVERTER-DEFIBRILLATOR) IN PLACE: ICD-10-CM

## 2023-12-11 DIAGNOSIS — I50.22 CHF (CONGESTIVE HEART FAILURE), NYHA CLASS II, CHRONIC, SYSTOLIC (HCC): Primary | ICD-10-CM

## 2023-12-11 DIAGNOSIS — I44.7 LBBB (LEFT BUNDLE BRANCH BLOCK): ICD-10-CM

## 2023-12-11 DIAGNOSIS — Z95.810 ICD (IMPLANTABLE CARDIOVERTER-DEFIBRILLATOR) IN PLACE: Primary | ICD-10-CM

## 2023-12-11 DIAGNOSIS — I42.8 NONISCHEMIC CARDIOMYOPATHY (HCC): ICD-10-CM

## 2023-12-11 PROCEDURE — 99214 OFFICE O/P EST MOD 30 MIN: CPT | Performed by: NURSE PRACTITIONER

## 2023-12-11 ASSESSMENT — ENCOUNTER SYMPTOMS
COUGH: 0
ABDOMINAL DISTENTION: 0
SHORTNESS OF BREATH: 0

## 2023-12-11 NOTE — PATIENT INSTRUCTIONS
You may receive a survey regarding the care you received during your visit. Your input is valuable to us. We encourage you to complete and return your survey. We hope you will choose us in the future for your healthcare needs. Your nurses today were Tiffanie and [employer].   Office hours:   Mon-Thurs 8-4:30  Friday 8-12  Phone: [phone]    Continue:  Continue current medications  Daily weights and record  Fluid restriction of 2 Liters per day  Limit sodium in diet to around 4470-0977 mg/day  Monitor BP  Activity as tolerated     Call the [address] for any of the following symptoms:   Weight gain of 2-3 pounds in 1 day or 5 pounds in 1 week  Increased shortness of breath  Shortness of breath while laying down  Cough  Chest pain  Swelling in feet, ankles or legs  Bloating in abdomen  Fatigue

## 2023-12-11 NOTE — PROGRESS NOTES
[facility]  Patient of Bautista Jo Mcleod here for device check. Changes made per [place]. Device recommending LV only. Would like to bring patient back when Dr Amelie Hartman gets back for a BiV Opt. Appt scheduled.      Battery 8.5 years    Presenting rhythm AS RVPLVP  Underlying AS VS    A Impedance 682  RV Impedance 386  LV Impedance 987    RV shock 93    P wave sensing 6.6  R wave sensing 21.9  LV wave 21.2    A Threshold 0.7 @ 0.40  RV Thresholds 1.3 @ 0.40  LV Thresholds 1.4 @ 1.0    A Paced 28%  RV Paced 99%  LV Paced 99%    Programmed Mode DDD   Max tracking rate 130  -- adjusted     Afib [place] <1%    Episodes :   ATR episodes -- No AF, actually Sinus tach, atrial rates 150 -- mode switch rate set at 150 -- adjusted

## 2023-12-11 NOTE — PROGRESS NOTES
Heart Failure Clinic       Visit Date: 12/11/2023  Cardiologist:  Dr. Franko Messer  Primary Care Physician: Dr. Maira Hernandez MD    Yecenia Diego is a 47 y.o. female who presents today for:  Chief Complaint   Patient presents with    Congestive Heart Failure       HPI:   Yecenia Diego is a 47 y.o. female who presents to the office for a follow up patient visit in the heart failure clinic. Accompanied by no one    TYPE HF: HFrEF (10-15% - 20-25% 11/2022 >> 40% 6/2023)  Cause: NICM - unknown etiology - likely viral - no ETOH or family hx  Device: CRT-D Jamie Arredondo, 2/2023)  HX: HLD, Hypothyroidism, Depression, former smoker, Covid 12/2021, LBBB     Dry Wt:  150     Hospitalization:  Aug 2022 - SOB, new severe CMP. Sinus tach - started Dig    12/2022 Dr Jamie Arredondo - plan for CRT-D - scheduled for tomorrow  2/2023 - 143#.  doing well - still some fatigue. Tires easily. Plan for CRT-D tomorrow   Scheduled for appt w/ Dr Rupesh Dozier in March - sleep eval  BPs have been better of recent - 120s.    8/2023 - 149#  Looks great today  Feeling good overall  Occasional fullness - taking Lasix approx 4x/week    Today 12/2023  - 149#  Was in hospital in October for Covid x 2 days. Getting back to normal   Not been eating best - taking PRN Lasix about 4x/week.    Going to [facility] in April      Past Medical History:   Diagnosis Date    Anxiety     Depression     Hypercholesterolemia     Hyperlipidemia     Hypothyroidism     New onset of congestive heart failure ([address]) 08/04/2022    Type 2 diabetes mellitus 05/25/2023     Past Surgical History:   Procedure Laterality Date    ABLATION OF DYSRHYTHMIC FOCUS      BREAST ENHANCEMENT SURGERY Bilateral 2004    CARDIAC SURGERY      Biventricular pacemaker    CHOLECYSTECTOMY      COSMETIC SURGERY      TONSILLECTOMY      TUBAL LIGATION       Family History   Problem Relation Age of Onset    Hearing Loss Mother     High Blood Pressure Mother     Lung Cancer Father     Depression Father

## 2024-01-17 ENCOUNTER — NURSE ONLY (OUTPATIENT)
Dept: CARDIOLOGY CLINIC | Age: 54
End: 2024-01-17

## 2024-01-17 DIAGNOSIS — Z95.810 ICD (IMPLANTABLE CARDIOVERTER-DEFIBRILLATOR) IN PLACE: Primary | ICD-10-CM

## 2024-01-17 NOTE — PROGRESS NOTES
DR IYER PT    PT BROUGHT TO THE OFFICE FOR Transifex BIV ICD OPTIMIZATION       PRESENTS IN ASRVLV PACED 90    UNDERLYING AS BV SENSED 91    DDD     BATTERY 8 YRS REMAINING    P WAVES 3.3  RV WAVES 19.9  LV WAVES 21.3    ATRIAL IMPEDENCE 659  RV IMPEDENCE 392  LVA IMPEDENCE 923  LVB IMPEDENCE 896  SHOCK IMPEDENCE 91    ATRIAL THRESHOLD AUTO 0.6 @ 0.4  RV THRESHOLD AUTO 1 @ 0.4  LV THRESHOLD 1.4 @ 1    ATRIAL AMPLITUDE AUTO 2 @ 0.4  RV AMPLITUDE AUTO 2 @ 0.4  LV AMPLITUDE AUTO 2.4 @ 1          Biv optimization presents in LV TIP 1 TO CAN -20 AT 146MS      FINAL LV TIP 1 TO CAN  LV ONLY   MS   PACED AV DELAY 130-160  SENSED AV DELAY

## 2024-01-29 ENCOUNTER — OFFICE VISIT (OUTPATIENT)
Dept: FAMILY MEDICINE CLINIC | Age: 54
End: 2024-01-29
Payer: COMMERCIAL

## 2024-01-29 ENCOUNTER — TELEPHONE (OUTPATIENT)
Dept: FAMILY MEDICINE CLINIC | Age: 54
End: 2024-01-29

## 2024-01-29 VITALS
BODY MASS INDEX: 27.8 KG/M2 | RESPIRATION RATE: 18 BRPM | OXYGEN SATURATION: 98 % | HEART RATE: 84 BPM | DIASTOLIC BLOOD PRESSURE: 76 MMHG | SYSTOLIC BLOOD PRESSURE: 126 MMHG | TEMPERATURE: 97 F | WEIGHT: 152 LBS

## 2024-01-29 DIAGNOSIS — G47.9 SLEEP DISTURBANCE: Primary | ICD-10-CM

## 2024-01-29 DIAGNOSIS — R73.9 HYPERGLYCEMIA: ICD-10-CM

## 2024-01-29 DIAGNOSIS — I42.8 NONISCHEMIC CARDIOMYOPATHY (HCC): ICD-10-CM

## 2024-01-29 DIAGNOSIS — I50.22 CHF (CONGESTIVE HEART FAILURE), NYHA CLASS II, CHRONIC, SYSTOLIC (HCC): ICD-10-CM

## 2024-01-29 LAB — HBA1C MFR BLD: 5.5 % (ref 4.3–5.7)

## 2024-01-29 PROCEDURE — G2211 COMPLEX E/M VISIT ADD ON: HCPCS | Performed by: FAMILY MEDICINE

## 2024-01-29 PROCEDURE — 99214 OFFICE O/P EST MOD 30 MIN: CPT | Performed by: FAMILY MEDICINE

## 2024-01-29 PROCEDURE — 83036 HEMOGLOBIN GLYCOSYLATED A1C: CPT | Performed by: FAMILY MEDICINE

## 2024-01-29 RX ORDER — DOXEPIN HYDROCHLORIDE 25 MG/1
25 CAPSULE ORAL NIGHTLY
Qty: 30 CAPSULE | Refills: 2 | Status: SHIPPED | OUTPATIENT
Start: 2024-01-29

## 2024-01-29 ASSESSMENT — ENCOUNTER SYMPTOMS
DIARRHEA: 0
EYE PAIN: 0
VOMITING: 0
ABDOMINAL PAIN: 0
SORE THROAT: 0
CHEST TIGHTNESS: 0
CONSTIPATION: 0
SHORTNESS OF BREATH: 0
COUGH: 0
RHINORRHEA: 0
WHEEZING: 0
BLOOD IN STOOL: 0
NAUSEA: 0
BACK PAIN: 0

## 2024-01-29 NOTE — PROGRESS NOTES
Lisa Ivan (:  1970) is a 53 y.o. female,Established patient, here for evaluation of the following chief complaint(s):  Insomnia         ASSESSMENT/PLAN:  1. Sleep disturbance  -     doxepin (SINEQUAN) 25 MG capsule; Take 1 capsule by mouth nightly, Disp-30 capsule, R-2Normal  -chronic condition, exacerbated, add doxepin, -monitor sxs, call if not improving    2. CHF (congestive heart failure), NYHA class II, chronic, systolic (HCC)  -stable, controlled on digoxin, jardiance, lasix, metoprolol, entresto, follows with cardiology  3. Nonischemic cardiomyopathy (HCC)  -stable, controlled on digoxin, jardiance, lasix, metoprolol, entresto, follows with cardiology  4. Hyperglycemia  -     POCT glycosylated hemoglobin (Hb A1C)  -check A1c, has DM on history but I see no A1c to substantiate it.  A1C is normal, will remove from history and problem list.    Results for orders placed or performed in visit on 24   POCT glycosylated hemoglobin (Hb A1C)   Result Value Ref Range    Hemoglobin A1C POC 5.5 4.3 - 5.7 %       No follow-ups on file.         Subjective   SUBJECTIVE/OBJECTIVE:  Insomnia  Pertinent negatives include no abdominal pain, chest pain, chills, congestion, coughing, fatigue, fever, headaches, joint swelling, myalgias, nausea, neck pain, rash, sore throat or vomiting.      Patient here today for a check up.  Reviewed BMI of 28.  Encouraged diet, exercise and weight loss.  Trouble with insomnia, falling and staying asleep.  Tried multiple OTCs without success.  , former smoker, pmh reviewed.     Review of Systems   Constitutional:  Negative for chills, fatigue, fever and unexpected weight change.   HENT:  Negative for congestion, ear pain, rhinorrhea and sore throat.    Eyes:  Negative for pain and visual disturbance.   Respiratory:  Negative for cough, chest tightness, shortness of breath and wheezing.    Cardiovascular:  Negative for chest pain and palpitations.   Gastrointestinal:

## 2024-01-29 NOTE — TELEPHONE ENCOUNTER
----- Message from Feroz Gomez MD sent at 1/29/2024  4:00 PM EST -----  A1c is normal.  No DM.  Removed from history.

## 2024-02-12 RX ORDER — FUROSEMIDE 20 MG/1
TABLET ORAL
Qty: 30 TABLET | Refills: 1 | Status: SHIPPED | OUTPATIENT
Start: 2024-02-12

## 2024-03-25 ENCOUNTER — HOSPITAL ENCOUNTER (EMERGENCY)
Age: 54
Discharge: HOME | End: 2024-03-25
Payer: COMMERCIAL

## 2024-03-25 VITALS
BODY MASS INDEX: 27.6 KG/M2 | TEMPERATURE: 97.7 F | HEIGHT: 62 IN | DIASTOLIC BLOOD PRESSURE: 73 MMHG | OXYGEN SATURATION: 98 % | RESPIRATION RATE: 16 BRPM | WEIGHT: 150 LBS | SYSTOLIC BLOOD PRESSURE: 148 MMHG | HEART RATE: 88 BPM

## 2024-03-25 DIAGNOSIS — T16.2XXA FOREIGN BODY OF LEFT EAR, INITIAL ENCOUNTER: Primary | ICD-10-CM

## 2024-03-25 PROCEDURE — 99213 OFFICE O/P EST LOW 20 MIN: CPT

## 2024-03-25 PROCEDURE — 69200 CLEAR OUTER EAR CANAL: CPT

## 2024-03-25 ASSESSMENT — PAIN DESCRIPTION - ONSET: ONSET: GRADUAL

## 2024-03-25 ASSESSMENT — PAIN DESCRIPTION - PAIN TYPE: TYPE: ACUTE PAIN

## 2024-03-25 ASSESSMENT — PAIN DESCRIPTION - FREQUENCY: FREQUENCY: INTERMITTENT

## 2024-03-25 ASSESSMENT — PAIN SCALES - GENERAL: PAINLEVEL_OUTOF10: 5

## 2024-03-25 ASSESSMENT — PAIN - FUNCTIONAL ASSESSMENT: PAIN_FUNCTIONAL_ASSESSMENT: 0-10

## 2024-03-25 ASSESSMENT — PAIN DESCRIPTION - DESCRIPTORS: DESCRIPTORS: THROBBING

## 2024-03-25 ASSESSMENT — PAIN DESCRIPTION - LOCATION: LOCATION: EAR

## 2024-03-25 ASSESSMENT — PAIN DESCRIPTION - ORIENTATION: ORIENTATION: LEFT

## 2024-03-25 NOTE — ED TRIAGE NOTES
Arrives to [facility] for the evaluation of foreign body in the left ear.  States the rubber piece from hearing aid came off in the ear Sat 3/23/24 night.  Thought maybe would come out on its own but has not.  Is having throbbing pain rated 5/10.  Having difficulty sleeping due to pain in the ear.  No drainage.  Able to visualize the grey hearing aid piece with an otoscope.  Waiting provider to assess at this time.

## 2024-03-25 NOTE — ED NOTES
Rubber piece from hearing aid removed from the left ear canal with an alligator forceps.  Tolerated well.  Inner ear assessment completed by VIVIANA Mon CNP.      Nkechi Braxton RN  03/25/24 1007

## 2024-03-25 NOTE — ED PROVIDER NOTES
[facility] CARE [place]  Urgent Care Encounter       CHIEF COMPLAINT       Chief Complaint   Patient presents with    Foreign Body in Ear     Piece of hearing aid in the left ear canal        Nurses Notes reviewed and I agree except as noted in the HPI.  HISTORY OF PRESENT ILLNESS   Lisa Ivan is a 53 y.o. female who presents with complaints of left ear foreign body. Pt reports that this occurred Saturday when she took out her hearing aid.     The history is provided by the patient.       REVIEW OF SYSTEMS     Review of Systems   HENT:          Foreign body in left ear   All other systems reviewed and are negative.      PAST MEDICAL HISTORY         Diagnosis Date    Anxiety     Depression     Hypercholesterolemia     Hyperlipidemia     Hypothyroidism     New onset of congestive heart failure (HCC) 08/04/2022       SURGICALHISTORY     Patient  has a past surgical history that includes Tubal ligation; ablation of dysrhythmic focus; Tonsillectomy; Cosmetic surgery; Cholecystectomy; Cardiac surgery; and Breast enhancement surgery (Bilateral, 2004).    CURRENT MEDICATIONS       Discharge Medication List as of 3/25/2024 10:02 AM        CONTINUE these medications which have NOT CHANGED    Details   furosemide (LASIX) 20 MG tablet take 1 tablet by mouth once daily if needed FOR WEIGHT GAIN OR SWELLING, Disp-30 tablet, R-1Normal      doxepin (SINEQUAN) 25 MG capsule Take 1 capsule by mouth nightly, Disp-30 capsule, R-2Normal      empagliflozin (JARDIANCE) 10 MG tablet Take 1 tablet by mouth daily, Disp-90 tablet, R-1Normal      !! metoprolol succinate (TOPROL XL) 50 MG extended release tablet TAKE 1 TABLET DAILY. TAKE  1/2 TABLET IF BLOOD        PRESSURE LESS THAN 110 AS  DIRECTED., Disp-90 tablet, R-2Normal      venlafaxine (EFFEXOR XR) 150 MG extended release capsule TAKE 1 CAPSULE ONCE DAILY, Disp-90 capsule, R-3Normal      levothyroxine (SYNTHROID) 75 MCG tablet TAKE 1 TABLET DAILY, Disp-90 tablet, R-3Normal

## 2024-04-02 ENCOUNTER — PROCEDURE VISIT (OUTPATIENT)
Dept: CARDIOLOGY CLINIC | Age: 54
End: 2024-04-02
Payer: COMMERCIAL

## 2024-04-02 DIAGNOSIS — Z95.810 ICD (IMPLANTABLE CARDIOVERTER-DEFIBRILLATOR) IN PLACE: Primary | ICD-10-CM

## 2024-04-02 PROCEDURE — 93295 DEV INTERROG REMOTE 1/2/MLT: CPT | Performed by: INTERNAL MEDICINE

## 2024-04-02 PROCEDURE — 93296 REM INTERROG EVL PM/IDS: CPT | Performed by: INTERNAL MEDICINE

## 2024-04-02 NOTE — PROGRESS NOTES
Remote [employer] BiV ICD   Patient of Robertson     Battery 9.5 years    Presenting rhythm AS LVP    A Impedance 652  RV Impedance 328  LV Impedance 875    RV shock 77    P wave sensing 6.9  R wave sensing -    A Threshold 0.6 @ 0.40  RV Thresholds 1.40 @ 0.40  LV Thresholds 1.7 @ 1    A Paced 22%  RV Paced 67%  LV Tbchd472%    Programmed Mode DDD 60    Afib [place] 0%    Episodes :   none    Heart Logic 1- WNL

## 2024-04-17 DIAGNOSIS — G47.9 SLEEP DISTURBANCE: ICD-10-CM

## 2024-04-17 RX ORDER — DOXEPIN HYDROCHLORIDE 25 MG/1
25 CAPSULE ORAL NIGHTLY
Qty: 90 CAPSULE | Refills: 1 | Status: SHIPPED | OUTPATIENT
Start: 2024-04-17

## 2024-04-17 NOTE — TELEPHONE ENCOUNTER
Lisa Ivan called requesting a refill on the following medications:  Requested Prescriptions     Pending Prescriptions Disp Refills    doxepin (SINEQUAN) 25 MG capsule [Pharmacy Med Name: DOXEPIN 25 MG CAPSULE] 30 capsule 2     Sig: take 1 capsule by mouth nightly       Date of last visit: 1/29/2024 acute  9/6/23 physical  Date of next visit (if applicable):Visit date not found  Date of last refill: 1/29/24 #30-2  Pharmacy Name: Sunita Chance RN

## 2024-04-24 RX ORDER — FUROSEMIDE 20 MG/1
TABLET ORAL
Qty: 30 TABLET | Refills: 1 | Status: SHIPPED | OUTPATIENT
Start: 2024-04-24

## 2024-05-10 ENCOUNTER — HOSPITAL ENCOUNTER (OUTPATIENT)
Age: 54
Discharge: HOME | End: 2024-05-10
Payer: COMMERCIAL

## 2024-05-10 DIAGNOSIS — I50.22 CHF (CONGESTIVE HEART FAILURE), NYHA CLASS II, CHRONIC, SYSTOLIC (HCC): ICD-10-CM

## 2024-05-10 LAB
ANION GAP SERPL CALC-SCNC: 12 MEQ/L (ref 8–16)
BUN SERPL-MCNC: 25 MG/DL (ref 7–22)
CALCIUM SERPL-MCNC: 9.7 MG/DL (ref 8.5–10.5)
CHLORIDE SERPL-SCNC: 104 MEQ/L (ref 98–111)
CO2 SERPL-SCNC: 29 MEQ/L (ref 23–33)
CREAT SERPL-MCNC: 1 MG/DL (ref 0.4–1.2)
GFR SERPL CREATININE-BSD FRML MDRD: 67 ML/MIN/1.73M2
GLUCOSE SERPL-MCNC: 102 MG/DL (ref 70–108)
POTASSIUM SERPL-SCNC: 5.1 MEQ/L (ref 3.5–5.2)
SODIUM SERPL-SCNC: 145 MEQ/L (ref 135–145)

## 2024-05-10 PROCEDURE — 80048 BASIC METABOLIC PNL TOTAL CA: CPT

## 2024-05-10 PROCEDURE — 36415 COLL VENOUS BLD VENIPUNCTURE: CPT

## 2024-05-30 ENCOUNTER — OFFICE VISIT (OUTPATIENT)
Dept: CARDIOLOGY CLINIC | Age: 54
End: 2024-05-30
Payer: COMMERCIAL

## 2024-05-30 VITALS
SYSTOLIC BLOOD PRESSURE: 118 MMHG | BODY MASS INDEX: 28.1 KG/M2 | DIASTOLIC BLOOD PRESSURE: 76 MMHG | HEART RATE: 74 BPM | HEIGHT: 63 IN | WEIGHT: 158.6 LBS

## 2024-05-30 DIAGNOSIS — I42.8 NONISCHEMIC CARDIOMYOPATHY (HCC): ICD-10-CM

## 2024-05-30 DIAGNOSIS — Z95.810 ICD (IMPLANTABLE CARDIOVERTER-DEFIBRILLATOR) IN PLACE: ICD-10-CM

## 2024-05-30 DIAGNOSIS — I50.22 CHF (CONGESTIVE HEART FAILURE), NYHA CLASS II, CHRONIC, SYSTOLIC (HCC): Primary | ICD-10-CM

## 2024-05-30 PROCEDURE — 99213 OFFICE O/P EST LOW 20 MIN: CPT | Performed by: INTERNAL MEDICINE

## 2024-05-30 RX ORDER — DIGOXIN 125 MCG
125 TABLET ORAL DAILY
Qty: 90 TABLET | Refills: 3 | Status: SHIPPED | OUTPATIENT
Start: 2024-05-30

## 2024-05-30 RX ORDER — FUROSEMIDE 20 MG/1
TABLET ORAL
Qty: 90 TABLET | Refills: 3 | Status: SHIPPED | OUTPATIENT
Start: 2024-05-30

## 2024-05-30 RX ORDER — METOPROLOL SUCCINATE 100 MG/1
100 TABLET, EXTENDED RELEASE ORAL DAILY
Qty: 90 TABLET | Refills: 3 | Status: SHIPPED | OUTPATIENT
Start: 2024-05-30

## 2024-05-30 RX ORDER — SACUBITRIL AND VALSARTAN 97; 103 MG/1; MG/1
1 TABLET, FILM COATED ORAL 2 TIMES DAILY
Qty: 180 TABLET | Refills: 3 | Status: SHIPPED | OUTPATIENT
Start: 2024-05-30

## 2024-05-30 RX ORDER — METOPROLOL SUCCINATE 50 MG/1
TABLET, EXTENDED RELEASE ORAL
Qty: 90 TABLET | Refills: 3 | Status: SHIPPED | OUTPATIENT
Start: 2024-05-30

## 2024-05-30 NOTE — PROGRESS NOTES
[facility] [place] [facility]  [facility] CARDIOLOGY  [address]  [address]  [place] [address]  Dept: [phone]  Dept Fax: [phone]  Loc: [phone]    Visit Date: 5/30/2024    Ms. Ivan is a 53 y.o. female  who presented for:  Chief Complaint   Patient presents with    1 Year Follow Up    Cardiomyopathy       HPI:   HPI   52 yo F with CHF following [facility].  No chest pain, angina, LE, orthopnea, PND, sob at rest, palpitations, LE edema, or syncope.    Can do all ADLs.  Some fatigue with the meds.  No shocks.  Tolerating meds.  She weighs herself daily.  Uses Lasix daily.       ECG (if obtained today):  none today.        Current Outpatient Medications:     furosemide (LASIX) 20 MG tablet, take 1 tablet by mouth once daily if needed FOR WEIGHT GAIN OR SWELLING, Disp: 30 tablet, Rfl: 1    doxepin (SINEQUAN) 25 MG capsule, take 1 capsule by mouth nightly, Disp: 90 capsule, Rfl: 1    empagliflozin (JARDIANCE) 10 MG tablet, Take 1 tablet by mouth daily, Disp: 90 tablet, Rfl: 1    metoprolol succinate (TOPROL XL) 50 MG extended release tablet, TAKE 1 TABLET DAILY. TAKE  1/2 TABLET IF BLOOD        PRESSURE LESS THAN 110 AS  DIRECTED., Disp: 90 tablet, Rfl: 2    venlafaxine (EFFEXOR XR) 150 MG extended release capsule, TAKE 1 CAPSULE ONCE DAILY, Disp: 90 capsule, Rfl: 3    levothyroxine (SYNTHROID) 75 MCG tablet, TAKE 1 TABLET DAILY, Disp: 90 tablet, Rfl: 3    digoxin (LANOXIN) 125 MCG tablet, Take 1 tablet by mouth daily, Disp: 90 tablet, Rfl: 3    sacubitril-valsartan (ENTRESTO)  MG per tablet, Take 1 tablet by mouth 2 times daily, Disp: , Rfl:     metoprolol succinate (TOPROL XL) 100 MG extended release tablet, Take 1 tablet by mouth daily, Disp: 90 tablet, Rfl: 1    acetaminophen (TYLENOL) 500 MG tablet, Take 1 tablet by mouth every 6 hours as needed for Pain, Disp: , Rfl:     Past Medical History  Lisa  has a past medical history of Anxiety, Depression, Hypercholesterolemia,

## 2024-05-30 NOTE — PATIENT INSTRUCTIONS
Your nurses today were ELVIS Cotter and RAMSEY Muro  Your provider today was Dr. Robertson  Phone number: [phone]  You may receive a survey regarding the care you received during your visit.  Your input is valuable to us.  We encourage you to complete and return your survey.  We hope you will choose us in the future for your healthcare needs.

## 2024-07-03 ENCOUNTER — PROCEDURE VISIT (OUTPATIENT)
Dept: CARDIOLOGY CLINIC | Age: 54
End: 2024-07-03
Payer: COMMERCIAL

## 2024-07-03 DIAGNOSIS — Z95.810 ICD (IMPLANTABLE CARDIOVERTER-DEFIBRILLATOR) IN PLACE: Primary | ICD-10-CM

## 2024-07-03 PROCEDURE — 93296 REM INTERROG EVL PM/IDS: CPT | Performed by: INTERNAL MEDICINE

## 2024-07-03 PROCEDURE — 93295 DEV INTERROG REMOTE 1/2/MLT: CPT | Performed by: INTERNAL MEDICINE

## 2024-07-03 NOTE — PROGRESS NOTES
Dr mcneil pt     Nxt [employer] b[employer] icd remote    Ddd     A paced 24%  Rv paced 80%  Lv paced 100%    P waves 5.7  Rv waves not obtained per the device     Lv waves not obtained per the device     Atrial impedence 666  Rv impedence 333  Lva impedence 100%  Lvb impedence 0%  Shock imepedence 85    Atrial threshold 0.6 @ 0.4  Rv threshold 1.5 @ 0.4  Lv threshold 1.8 @ 1    Atrial, rv and lv amplitudes   Hear logic 10

## 2024-08-06 ENCOUNTER — PROCEDURE VISIT (OUTPATIENT)
Dept: CARDIOLOGY CLINIC | Age: 54
End: 2024-08-06
Payer: COMMERCIAL

## 2024-08-06 DIAGNOSIS — I50.22 CHF (CONGESTIVE HEART FAILURE), NYHA CLASS II, CHRONIC, SYSTOLIC (HCC): Primary | ICD-10-CM

## 2024-08-06 PROCEDURE — 93297 REM INTERROG DEV EVAL ICPMS: CPT | Performed by: INTERNAL MEDICINE

## 2024-08-06 NOTE — PROGRESS NOTES
Dr mcneil pt    Nxt [employer] sci heart logic remote   Battery 9.5 yrs remaining    Heart logic 0    No episodes

## 2024-08-12 ENCOUNTER — OFFICE VISIT (OUTPATIENT)
Dept: CARDIOLOGY CLINIC | Age: 54
End: 2024-08-12
Payer: COMMERCIAL

## 2024-08-12 VITALS
WEIGHT: 162.2 LBS | DIASTOLIC BLOOD PRESSURE: 98 MMHG | SYSTOLIC BLOOD PRESSURE: 150 MMHG | OXYGEN SATURATION: 99 % | BODY MASS INDEX: 28.74 KG/M2 | HEART RATE: 82 BPM | HEIGHT: 63 IN

## 2024-08-12 DIAGNOSIS — I44.7 LBBB (LEFT BUNDLE BRANCH BLOCK): ICD-10-CM

## 2024-08-12 DIAGNOSIS — I50.22 CHRONIC SYSTOLIC CONGESTIVE HEART FAILURE (HCC): Primary | ICD-10-CM

## 2024-08-12 DIAGNOSIS — I50.22 CHF (CONGESTIVE HEART FAILURE), NYHA CLASS II, CHRONIC, SYSTOLIC (HCC): ICD-10-CM

## 2024-08-12 DIAGNOSIS — I42.8 NONISCHEMIC CARDIOMYOPATHY (HCC): ICD-10-CM

## 2024-08-12 PROCEDURE — 99214 OFFICE O/P EST MOD 30 MIN: CPT | Performed by: NURSE PRACTITIONER

## 2024-08-12 RX ORDER — FLUOCINONIDE 1 MG/G
CREAM TOPICAL
COMMUNITY
Start: 2024-08-07

## 2024-08-12 RX ORDER — HYDROXYZINE HYDROCHLORIDE 10 MG/1
TABLET, FILM COATED ORAL
COMMUNITY
Start: 2024-08-07

## 2024-08-12 RX ORDER — METOPROLOL SUCCINATE 100 MG/1
200 TABLET, EXTENDED RELEASE ORAL DAILY
Qty: 180 TABLET | Refills: 3 | Status: SHIPPED | OUTPATIENT
Start: 2024-08-12

## 2024-08-12 ASSESSMENT — ENCOUNTER SYMPTOMS
ABDOMINAL DISTENTION: 0
SHORTNESS OF BREATH: 0
COUGH: 0

## 2024-08-12 NOTE — PROGRESS NOTES
review of chart: labs, ECHO, radiology reports, etc.   I personally spent more then 50% of the appt time face to face with the patient.  Daily weights  Fluid restriction of 2 Liters per day  Limit sodium in diet to around 2510-0333 mg/day  Monitor BP  Activity as tolerated     Patient was instructed to call the Heart Failure Clinic for any changes in symptoms as noted in AVS.      No follow-ups on file. or sooner if needed     Patient given educational materials - see patient instructions.   We discussed the importance of weighing oneself and recording daily. We also discussed the importance of a low sodium diet, higher sodium foods to avoid and better low sodium food options.   Patient verbalizes understanding of plan of care using teach back method, and is agreeable to the treatment plan.       Electronically signed by DILIP Franklin CNP on 8/12/2024 at 2:31 PM

## 2024-08-12 NOTE — PATIENT INSTRUCTIONS
You may receive a survey regarding the care you received during your visit.  Your input is valuable to us.  We encourage you to complete and return your survey.  We hope you will choose us in the future for your healthcare needs.    Your nurses today were Ruiz.  Office hours:   Mon-Thurs 8-4:30  Friday 8-12  Phone: [phone]    Continue:  Continue current medications  Daily weights and record  Fluid restriction of 2 Liters per day  Limit sodium in diet to around 5108-1663 mg/day  Monitor BP  Activity as tolerated     Call the Heart Failure Clinic for any of the following symptoms:   Weight gain of 3 pounds in 1 day or 5 pounds in 1 week  Increased shortness of breath  Shortness of breath while laying down  Cough  Chest pain  Swelling in feet, ankles or legs  Bloating in abdomen  Fatigue          Get labs in next few weeks.      Utilize Lasix as needed - may need keep it at 4 days/week

## 2024-08-18 DIAGNOSIS — E03.4 HYPOTHYROIDISM DUE TO ACQUIRED ATROPHY OF THYROID: ICD-10-CM

## 2024-08-19 ENCOUNTER — HOSPITAL ENCOUNTER (OUTPATIENT)
Age: 54
Discharge: HOME | End: 2024-08-21
Payer: COMMERCIAL

## 2024-08-19 ENCOUNTER — TELEPHONE (OUTPATIENT)
Dept: CARDIOLOGY CLINIC | Age: 54
End: 2024-08-19

## 2024-08-19 DIAGNOSIS — I50.22 CHRONIC SYSTOLIC CONGESTIVE HEART FAILURE (HCC): ICD-10-CM

## 2024-08-19 LAB
ECHO AO ASC DIAM: 3.2 CM
ECHO AV CUSP MM: 1.7 CM
ECHO AV PEAK GRADIENT: 6 MMHG
ECHO AV PEAK VELOCITY: 1.3 M/S
ECHO AV VELOCITY RATIO: 0.69
ECHO EST RA PRESSURE: 3 MMHG
ECHO LA AREA 2C: 11.2 CM2
ECHO LA AREA 4C: 12.3 CM2
ECHO LA DIAMETER: 2.7 CM
ECHO LA MAJOR AXIS: 4.3 CM
ECHO LA MINOR AXIS: 4.7 CM
ECHO LA VOL BP: 26 ML (ref 22–52)
ECHO LA VOL MOD A2C: 22 ML (ref 22–52)
ECHO LA VOL MOD A4C: 28 ML (ref 22–52)
ECHO LV E' LATERAL VELOCITY: 12 CM/S
ECHO LV E' SEPTAL VELOCITY: 8 CM/S
ECHO LV FRACTIONAL SHORTENING: 29 % (ref 28–44)
ECHO LV INTERNAL DIMENSION DIASTOLIC: 4.5 CM (ref 3.9–5.3)
ECHO LV INTERNAL DIMENSION SYSTOLIC: 3.2 CM
ECHO LV ISOVOLUMETRIC RELAXATION TIME (IVRT): 92 MS
ECHO LV IVSD: 0.9 CM (ref 0.6–0.9)
ECHO LV MASS 2D: 132.8 G (ref 67–162)
ECHO LV POSTERIOR WALL DIASTOLIC: 0.9 CM (ref 0.6–0.9)
ECHO LV RELATIVE WALL THICKNESS RATIO: 0.4
ECHO LVOT PEAK GRADIENT: 4 MMHG
ECHO LVOT PEAK VELOCITY: 0.9 M/S
ECHO MV A VELOCITY: 0.86 M/S
ECHO MV E DECELERATION TIME (DT): 289 MS
ECHO MV E VELOCITY: 0.81 M/S
ECHO MV E/A RATIO: 0.94
ECHO MV E/E' LATERAL: 6.75
ECHO MV E/E' RATIO (AVERAGED): 8.44
ECHO MV E/E' SEPTAL: 10.13
ECHO PV MAX VELOCITY: 0.7 M/S
ECHO PV PEAK GRADIENT: 2 MMHG
ECHO RIGHT VENTRICULAR SYSTOLIC PRESSURE (RVSP): 25 MMHG
ECHO RV INTERNAL DIMENSION: 2.7 CM
ECHO RV TAPSE: 2.1 CM (ref 1.7–?)
ECHO TV E WAVE: 0.6 M/S
ECHO TV REGURGITANT MAX VELOCITY: 2.36 M/S
ECHO TV REGURGITANT PEAK GRADIENT: 22 MMHG

## 2024-08-19 PROCEDURE — 93306 TTE W/DOPPLER COMPLETE: CPT | Performed by: INTERNAL MEDICINE

## 2024-08-19 PROCEDURE — 93306 TTE W/DOPPLER COMPLETE: CPT

## 2024-08-19 NOTE — TELEPHONE ENCOUNTER
----- Message from DILIP Ordonez CNP sent at 8/19/2024  4:04 PM EDT -----  Echo reviewed.  EF improved significantly from last year.  Was 40% up to 55-60%

## 2024-08-20 RX ORDER — LEVOTHYROXINE SODIUM 0.07 MG/1
TABLET ORAL
Qty: 90 TABLET | Refills: 3 | OUTPATIENT
Start: 2024-08-20

## 2024-08-20 NOTE — TELEPHONE ENCOUNTER
LM message asking pt to make annual appt for next mth to refill meds. Reminded her that doctor will be out for 2 weeks mid September. Asked her to call  if needs short-term Rx or help scheduling appt.    Suggested she could also make appt via iTherX.

## 2024-08-26 ENCOUNTER — HOSPITAL ENCOUNTER (OUTPATIENT)
Age: 54
Discharge: HOME | End: 2024-08-26
Payer: COMMERCIAL

## 2024-08-26 DIAGNOSIS — I50.22 CHRONIC SYSTOLIC CONGESTIVE HEART FAILURE (HCC): ICD-10-CM

## 2024-08-26 PROCEDURE — 36415 COLL VENOUS BLD VENIPUNCTURE: CPT

## 2024-08-26 PROCEDURE — 80162 ASSAY OF DIGOXIN TOTAL: CPT

## 2024-08-26 PROCEDURE — 80048 BASIC METABOLIC PNL TOTAL CA: CPT

## 2024-08-27 LAB
ANION GAP SERPL CALC-SCNC: 12 MEQ/L (ref 8–16)
BUN SERPL-MCNC: 23 MG/DL (ref 7–22)
CALCIUM SERPL-MCNC: 10 MG/DL (ref 8.5–10.5)
CHLORIDE SERPL-SCNC: 101 MEQ/L (ref 98–111)
CO2 SERPL-SCNC: 27 MEQ/L (ref 23–33)
CREAT SERPL-MCNC: 0.9 MG/DL (ref 0.4–1.2)
DIGOXIN SERPL-MCNC: 0.8 NG/ML (ref 0.5–2)
GFR SERPL CREATININE-BSD FRML MDRD: 76 ML/MIN/1.73M2
GLUCOSE SERPL-MCNC: 90 MG/DL (ref 70–108)
POTASSIUM SERPL-SCNC: 4.8 MEQ/L (ref 3.5–5.2)
SODIUM SERPL-SCNC: 140 MEQ/L (ref 135–145)

## 2024-09-13 PROCEDURE — 93297 REM INTERROG DEV EVAL ICPMS: CPT | Performed by: INTERNAL MEDICINE

## 2024-09-23 ENCOUNTER — OFFICE VISIT (OUTPATIENT)
Dept: FAMILY MEDICINE CLINIC | Age: 54
End: 2024-09-23
Payer: COMMERCIAL

## 2024-09-23 VITALS
TEMPERATURE: 96.8 F | HEART RATE: 61 BPM | HEIGHT: 62 IN | WEIGHT: 160.4 LBS | OXYGEN SATURATION: 99 % | BODY MASS INDEX: 29.52 KG/M2 | DIASTOLIC BLOOD PRESSURE: 60 MMHG | RESPIRATION RATE: 16 BRPM | SYSTOLIC BLOOD PRESSURE: 124 MMHG

## 2024-09-23 DIAGNOSIS — G47.9 SLEEP DISTURBANCE: ICD-10-CM

## 2024-09-23 DIAGNOSIS — E03.4 HYPOTHYROIDISM DUE TO ACQUIRED ATROPHY OF THYROID: ICD-10-CM

## 2024-09-23 DIAGNOSIS — F32.A DEPRESSION, UNSPECIFIED DEPRESSION TYPE: ICD-10-CM

## 2024-09-23 DIAGNOSIS — Z12.31 ENCOUNTER FOR SCREENING MAMMOGRAM FOR MALIGNANT NEOPLASM OF BREAST: ICD-10-CM

## 2024-09-23 DIAGNOSIS — Z00.00 WELL ADULT EXAM: Primary | ICD-10-CM

## 2024-09-23 DIAGNOSIS — F41.9 ANXIETY: ICD-10-CM

## 2024-09-23 DIAGNOSIS — Z12.11 SCREEN FOR COLON CANCER: ICD-10-CM

## 2024-09-23 LAB
BASOPHILS ABSOLUTE: 0 THOU/MM3 (ref 0–0.1)
BASOPHILS NFR BLD AUTO: 0.9 %
DEPRECATED RDW RBC AUTO: 46.6 FL (ref 35–45)
EOSINOPHIL NFR BLD AUTO: 1.5 %
EOSINOPHILS ABSOLUTE: 0.1 THOU/MM3 (ref 0–0.4)
ERYTHROCYTE [DISTWIDTH] IN BLOOD BY AUTOMATED COUNT: 13.2 % (ref 11.5–14.5)
HCT VFR BLD AUTO: 48.3 % (ref 37–47)
HGB BLD-MCNC: 15.4 GM/DL (ref 12–16)
IMM GRANULOCYTES # BLD AUTO: 0.01 THOU/MM3 (ref 0–0.07)
IMM GRANULOCYTES NFR BLD AUTO: 0.2 %
LYMPHOCYTES ABSOLUTE: 1.4 THOU/MM3 (ref 1–4.8)
LYMPHOCYTES NFR BLD AUTO: 26.8 %
MCH RBC QN AUTO: 30.6 PG (ref 26–33)
MCHC RBC AUTO-ENTMCNC: 31.9 GM/DL (ref 32.2–35.5)
MCV RBC AUTO: 95.8 FL (ref 81–99)
MONOCYTES ABSOLUTE: 0.7 THOU/MM3 (ref 0.4–1.3)
MONOCYTES NFR BLD AUTO: 13.3 %
NEUTROPHILS ABSOLUTE: 3 THOU/MM3 (ref 1.8–7.7)
NEUTROPHILS NFR BLD AUTO: 57.3 %
NRBC BLD AUTO-RTO: 0 /100 WBC
PLATELET # BLD AUTO: 210 THOU/MM3 (ref 130–400)
PMV BLD AUTO: 11.4 FL (ref 9.4–12.4)
RBC # BLD AUTO: 5.04 MILL/MM3 (ref 4.2–5.4)
WBC # BLD AUTO: 5.3 THOU/MM3 (ref 4.8–10.8)

## 2024-09-23 PROCEDURE — 99396 PREV VISIT EST AGE 40-64: CPT | Performed by: FAMILY MEDICINE

## 2024-09-23 RX ORDER — LEVOTHYROXINE SODIUM 75 UG/1
TABLET ORAL
Qty: 90 TABLET | Refills: 3 | Status: SHIPPED | OUTPATIENT
Start: 2024-09-23

## 2024-09-23 RX ORDER — DOXEPIN HYDROCHLORIDE 25 MG/1
25 CAPSULE ORAL NIGHTLY
Qty: 90 CAPSULE | Refills: 3 | Status: SHIPPED | OUTPATIENT
Start: 2024-09-23

## 2024-09-23 RX ORDER — VENLAFAXINE HYDROCHLORIDE 150 MG/1
CAPSULE, EXTENDED RELEASE ORAL
Qty: 90 CAPSULE | Refills: 3 | Status: SHIPPED | OUTPATIENT
Start: 2024-09-23

## 2024-09-23 SDOH — ECONOMIC STABILITY: FOOD INSECURITY: WITHIN THE PAST 12 MONTHS, YOU WORRIED THAT YOUR FOOD WOULD RUN OUT BEFORE YOU GOT MONEY TO BUY MORE.: NEVER TRUE

## 2024-09-23 SDOH — ECONOMIC STABILITY: FOOD INSECURITY: WITHIN THE PAST 12 MONTHS, THE FOOD YOU BOUGHT JUST DIDN'T LAST AND YOU DIDN'T HAVE MONEY TO GET MORE.: NEVER TRUE

## 2024-09-23 SDOH — ECONOMIC STABILITY: INCOME INSECURITY: HOW HARD IS IT FOR YOU TO PAY FOR THE VERY BASICS LIKE FOOD, HOUSING, MEDICAL CARE, AND HEATING?: NOT HARD AT ALL

## 2024-09-23 ASSESSMENT — ENCOUNTER SYMPTOMS
VOMITING: 0
WHEEZING: 0
COUGH: 0
BLOOD IN STOOL: 0
DIARRHEA: 0
EYE PAIN: 0
ABDOMINAL PAIN: 0
BACK PAIN: 0
NAUSEA: 0
SHORTNESS OF BREATH: 0
SORE THROAT: 0
RHINORRHEA: 0
CONSTIPATION: 0
CHEST TIGHTNESS: 0

## 2024-09-24 ENCOUNTER — TELEPHONE (OUTPATIENT)
Dept: FAMILY MEDICINE CLINIC | Age: 54
End: 2024-09-24

## 2024-09-24 LAB
ALBUMIN SERPL BCG-MCNC: 4.8 G/DL (ref 3.5–5.1)
ALP SERPL-CCNC: 92 U/L (ref 38–126)
ALT SERPL W/O P-5'-P-CCNC: 32 U/L (ref 11–66)
ANION GAP SERPL CALC-SCNC: 11 MEQ/L (ref 8–16)
AST SERPL-CCNC: 36 U/L (ref 5–40)
BILIRUB SERPL-MCNC: 0.4 MG/DL (ref 0.3–1.2)
BUN SERPL-MCNC: 15 MG/DL (ref 7–22)
CALCIUM SERPL-MCNC: 10.2 MG/DL (ref 8.5–10.5)
CHLORIDE SERPL-SCNC: 102 MEQ/L (ref 98–111)
CHOLEST SERPL-MCNC: 237 MG/DL (ref 100–199)
CO2 SERPL-SCNC: 29 MEQ/L (ref 23–33)
CREAT SERPL-MCNC: 0.8 MG/DL (ref 0.4–1.2)
GFR SERPL CREATININE-BSD FRML MDRD: 88 ML/MIN/1.73M2
GLUCOSE SERPL-MCNC: 100 MG/DL (ref 70–108)
HDLC SERPL-MCNC: 46 MG/DL
LDLC SERPL CALC-MCNC: 158 MG/DL
POTASSIUM SERPL-SCNC: 5.3 MEQ/L (ref 3.5–5.2)
PROT SERPL-MCNC: 7.7 G/DL (ref 6.1–8)
SODIUM SERPL-SCNC: 142 MEQ/L (ref 135–145)
T4 FREE SERPL-MCNC: 1.3 NG/DL (ref 0.93–1.68)
TRIGL SERPL-MCNC: 167 MG/DL (ref 0–199)
TSH SERPL DL<=0.005 MIU/L-ACNC: 3.1 UIU/ML (ref 0.4–4.2)

## 2024-10-02 ENCOUNTER — OFFICE VISIT (OUTPATIENT)
Dept: INTERNAL MEDICINE CLINIC | Age: 54
End: 2024-10-02

## 2024-10-02 VITALS — HEIGHT: 62 IN | BODY MASS INDEX: 30.18 KG/M2 | WEIGHT: 164 LBS

## 2024-10-02 DIAGNOSIS — I50.22 CHRONIC SYSTOLIC CONGESTIVE HEART FAILURE (HCC): Primary | ICD-10-CM

## 2024-10-02 PROCEDURE — NBSRV NON-BILLABLE SERVICE: Performed by: DIETITIAN, REGISTERED

## 2024-10-02 NOTE — PROGRESS NOTES
[facility]  [facility]  [address]  [place] [address]  [phone] (phone)  [phone] (fax)    Patient Name: Lisa Ivan. Date of Birth: [id number]. MRN: [id number]      Assessment: Patient is a 54 y.o. female seen for Initial MNT visit for CHF.     -Nutritionally relevant labs:   Lab Results   Component Value Date/Time    LABA1C 5.5 01/29/2024 03:01 PM    GLUCOSE 100 09/23/2024 01:41 PM    GLUCOSE 90 08/26/2024 11:13 AM    GLUCOSE 90 04/30/2018 09:52 AM    GLUCOSE 89 04/28/2017 11:31 AM    CHOL 237 (H) 09/23/2024 01:41 PM    HDL 46 09/23/2024 01:41 PM    TRIG 167 09/23/2024 01:41 PM     Pt here with little ~ 2-3 yr old grandson.  Pt states She started seeing a  ~3-4 weeks ago and started a Yoga class once a week. See's [facility] once a week.  Pt is using an mary on her phone that her  recommended to use. This will also track her fluid intake.    Pt c/o not hungry and attributes to all the meds she takes.   Pt states hungry for dinner and in the evening.  Pt likes meat and potatoes   Pt dx with CHF 2 years ago Aug 2022.  When out of the hospital she was doing all fresh fruit and veggies and got the weight off. She avoided potatoes and did Cardiac Rehab.    On Wed. She watches her grandson.    Retired after CHF dx. Aug 2022. EF 13% and now up to 50% with pacer and medications. Has a [place] doctor too for her CHF as needed for the future.  Pt states she has no exercise restrictions.    -Food recall:   Breakfast: skips.  coffee 2 large cups w/caramel macchioto creamer.    Protein bar occ.  Lunch: Skips.   Dinner: 4-6 pm - Makes meal for her  and her mom usually comes over to eat with them.  She makes steak and baked potato OR meatloaf.   Snacks: evening snacking - She likes the little flavored rice cake snack food and can a lot of the snack bag.  -Main Beverages: Fluid Restriction - 2 liter. Does not track fluid

## 2024-10-02 NOTE — PATIENT INSTRUCTIONS
Good idea to start eating at least 3 meals/day  - start with a mini meal by late morning ~ 10 am and then again @ 2 pm and then dinner.  - Ok to have a healthy snack in the evening.    Sodium budget for the day 1500 - 1800 mg/day.    Refer to low sodium grocery list to help to have the right foods at home.    Pre-prep veggies for the busy week.  - have Fresh fruit front and center in the frig.    Bring a 2 week food log to next dietitian appt.

## 2024-11-11 ENCOUNTER — TELEPHONE (OUTPATIENT)
Dept: CARDIOLOGY CLINIC | Age: 54
End: 2024-11-11

## 2024-11-11 NOTE — TELEPHONE ENCOUNTER
PT CALLED THE OFFICE BACK AND SHE SAID SHE HAS BEEN FEELING VERY ANXIOUS LATELY AND RESTLESS. SHE FEELS LIKE HER BREATHE IS TAKEN AWAY AT TIMES BUT SHE SAID SHE IS ALWAYS TIRED FROM ALL OF THE TOPROL SHE TAKES BUT THEN SHE CANNOT SLEEP AT NIGHT  TOLD HER SHE PRESENTED WITH A HEART RATE IN 'S TODAY ON HER DOWNLOAD AND THE EPISODES WHERE HER HEART RATE  BPM    DOWNLOAD IS UNDER THE MEDIA TAB     TOLD HER WE WILL CALL HER BACK AFTER SHIREEN HUNTER

## 2024-11-11 NOTE — TELEPHONE ENCOUNTER
DR IYER PT   RECEIVED A Lee Silber DOWNLOAD ON THIS PT TODAY    NOTED PRESENTING FAST HEART RATE 120-130'S  HEART LOGIC 0    PT HAD 3 HIGH HEART RATE EPISODES ON 11/8/24  WITH HEART RATES IN 'S    THE DEVICE IS CALLING THIS PMT/ I THINK IT LOOKS MORE LIKE SVT     LMOM FOR PT TO CALL THIS OFFICE TO SEE IF SHE IS HAVING ANY SYMPTOMS      ENTIRED DOWNLOAD UNDER THE MEDIA TAB

## 2024-11-14 PROCEDURE — 93297 REM INTERROG DEV EVAL ICPMS: CPT | Performed by: INTERNAL MEDICINE

## 2024-11-19 ENCOUNTER — HOSPITAL ENCOUNTER (OUTPATIENT)
Dept: MAMMOGRAPHY | Age: 54
Discharge: HOME | End: 2024-11-19
Payer: COMMERCIAL

## 2024-11-19 VITALS — HEIGHT: 62 IN | WEIGHT: 164 LBS | BODY MASS INDEX: 30.18 KG/M2

## 2024-11-19 DIAGNOSIS — Z00.00 WELL ADULT EXAM: ICD-10-CM

## 2024-11-19 DIAGNOSIS — Z12.31 ENCOUNTER FOR SCREENING MAMMOGRAM FOR MALIGNANT NEOPLASM OF BREAST: ICD-10-CM

## 2024-11-19 PROCEDURE — 77063 BREAST TOMOSYNTHESIS BI: CPT

## 2024-11-20 NOTE — TELEPHONE ENCOUNTER
GABRIELI   She sees Dr. Trinidad at [facility], she states she will call them and see who they recommend since her physician Dr Pack has left [facility]

## 2024-11-21 ENCOUNTER — TELEPHONE (OUTPATIENT)
Dept: FAMILY MEDICINE CLINIC | Age: 54
End: 2024-11-21

## 2024-11-21 NOTE — TELEPHONE ENCOUNTER
----- Message from Dr. Feroz Gomez MD sent at 11/21/2024  7:57 AM EST -----  Normal mammogram, continue yearly screenings.

## 2025-01-23 ENCOUNTER — OFFICE VISIT (OUTPATIENT)
Dept: CARDIOLOGY CLINIC | Age: 55
End: 2025-01-23
Payer: COMMERCIAL

## 2025-01-23 ENCOUNTER — NURSE ONLY (OUTPATIENT)
Dept: CARDIOLOGY CLINIC | Age: 55
End: 2025-01-23

## 2025-01-23 ENCOUNTER — TELEPHONE (OUTPATIENT)
Dept: CARDIOLOGY CLINIC | Age: 55
End: 2025-01-23

## 2025-01-23 VITALS
HEIGHT: 62 IN | BODY MASS INDEX: 27.46 KG/M2 | OXYGEN SATURATION: 99 % | DIASTOLIC BLOOD PRESSURE: 92 MMHG | HEART RATE: 82 BPM | WEIGHT: 149.2 LBS | SYSTOLIC BLOOD PRESSURE: 136 MMHG

## 2025-01-23 DIAGNOSIS — I44.7 LBBB (LEFT BUNDLE BRANCH BLOCK): ICD-10-CM

## 2025-01-23 DIAGNOSIS — I50.22 CHF NYHA CLASS II, CHRONIC, SYSTOLIC (HCC): Primary | ICD-10-CM

## 2025-01-23 DIAGNOSIS — I42.8 NONISCHEMIC CARDIOMYOPATHY (HCC): ICD-10-CM

## 2025-01-23 DIAGNOSIS — Z95.810 ICD (IMPLANTABLE CARDIOVERTER-DEFIBRILLATOR) IN PLACE: Primary | ICD-10-CM

## 2025-01-23 DIAGNOSIS — I47.10 SVT (SUPRAVENTRICULAR TACHYCARDIA) (HCC): ICD-10-CM

## 2025-01-23 PROCEDURE — 99214 OFFICE O/P EST MOD 30 MIN: CPT | Performed by: NURSE PRACTITIONER

## 2025-01-23 ASSESSMENT — ENCOUNTER SYMPTOMS
SHORTNESS OF BREATH: 0
ABDOMINAL DISTENTION: 0
COUGH: 0

## 2025-01-23 NOTE — PROGRESS NOTES
reviewed:  BNP: No results found for: \"BNP\"  CBC:   Lab Results   Component Value Date/Time    WBC 5.3 09/23/2024 01:41 PM    RBC 5.04 09/23/2024 01:41 PM    RBC 5.26 04/30/2018 09:52 AM    HGB 15.4 09/23/2024 01:41 PM    HCT 48.3 09/23/2024 01:41 PM     09/23/2024 01:41 PM     CMP:    Lab Results   Component Value Date/Time     09/23/2024 01:41 PM    K 5.3 09/23/2024 01:41 PM    K 3.8 08/03/2022 04:10 PM     09/23/2024 01:41 PM    CO2 29 09/23/2024 01:41 PM    BUN 15 09/23/2024 01:41 PM    CREATININE 0.8 09/23/2024 01:41 PM    LABGLOM 88 09/23/2024 01:41 PM    LABGLOM >60 10/23/2023 07:11 AM    GLUCOSE 100 09/23/2024 01:41 PM    GLUCOSE 90 04/30/2018 09:52 AM    CALCIUM 10.2 09/23/2024 01:41 PM     Hepatic Function Panel:    Lab Results   Component Value Date/Time    ALKPHOS 92 09/23/2024 01:41 PM    ALT 32 09/23/2024 01:41 PM    AST 36 09/23/2024 01:41 PM    BILITOT 0.4 09/23/2024 01:41 PM    BILIDIR <0.2 10/22/2023 05:29 AM     Magnesium:    Lab Results   Component Value Date/Time    MG 2.3 10/23/2023 07:11 AM     PT/INR:    Lab Results   Component Value Date/Time    INR 1.24 08/04/2022 11:09 AM     Lipids:    Lab Results   Component Value Date/Time    TRIG 167 09/23/2024 01:41 PM    HDL 46 09/23/2024 01:41 PM       ASSESSMENT AND PLAN:      Diagnosis Orders   1. CHF NYHA class II, chronic, systolic (HCC)  Basic Metabolic Panel    Digoxin Level      2. SVT (supraventricular tachycardia) ([facility])  Aleksandar Ramos MD, Cardiac Electrophysiology, [place]    Basic Metabolic Panel    Digoxin Level      3. Nonischemic cardiomyopathy ([facility])        4. LBBB (left bundle branch block)              Continue:  GDMT:              ACE/ARB/ARNI - Entresto 97/103 BID              BB - Toprol 200/day              SGLT2 -  Jardiance 10/day  AA - Aldactone 25/day - STOPPED 8/2022 d/t elevated K+ 5.6 - continues run high  Diuretic - Lasix 20/day PRN - about  4 days/week  Vasodilator - no  Other - Digoxin    HFrEF,

## 2025-01-23 NOTE — TELEPHONE ENCOUNTER
Feeling pounding in chest/palps at night   I wondering if it is her auto threshold testing  I programmed them off A, RV, LV  Check on her Monday to see if any improvement

## 2025-01-23 NOTE — PATIENT INSTRUCTIONS
You may receive a survey regarding the care you received during your visit.  Your input is valuable to us.  We encourage you to complete and return your survey.  We hope you will choose us in the future for your healthcare needs.    Your nurses today were Tamie Short and Regla.  Office hours:   Mon-Thurs 8-4:30  Friday 8-12  Phone: [phone]    Continue:  Continue current medications  Daily weights and record  Fluid restriction of 2 Liters per day  Limit sodium in diet to around 8869-0580 mg/day  Monitor BP    Call the Heart Failure Clinic for any of the following symptoms:   Weight gain of 3 pounds in 1 day or 5 pounds in 1 week  Increased shortness of breath  Shortness of breath while laying down  Chest pain  Swelling in feet, ankles or legs  Bloating in abdomen  Fatigue

## 2025-01-27 ENCOUNTER — OFFICE VISIT (OUTPATIENT)
Dept: FAMILY MEDICINE CLINIC | Age: 55
End: 2025-01-27
Payer: COMMERCIAL

## 2025-01-27 VITALS
TEMPERATURE: 97.9 F | DIASTOLIC BLOOD PRESSURE: 76 MMHG | HEART RATE: 79 BPM | RESPIRATION RATE: 20 BRPM | OXYGEN SATURATION: 99 % | SYSTOLIC BLOOD PRESSURE: 118 MMHG | BODY MASS INDEX: 27.07 KG/M2 | WEIGHT: 148 LBS

## 2025-01-27 DIAGNOSIS — G47.9 SLEEP DISTURBANCE: Primary | ICD-10-CM

## 2025-01-27 DIAGNOSIS — N95.1 HOT FLASH, MENOPAUSAL: ICD-10-CM

## 2025-01-27 DIAGNOSIS — E03.4 HYPOTHYROIDISM DUE TO ACQUIRED ATROPHY OF THYROID: ICD-10-CM

## 2025-01-27 DIAGNOSIS — F32.A DEPRESSION, UNSPECIFIED DEPRESSION TYPE: ICD-10-CM

## 2025-01-27 PROCEDURE — 99214 OFFICE O/P EST MOD 30 MIN: CPT | Performed by: FAMILY MEDICINE

## 2025-01-27 PROCEDURE — G2211 COMPLEX E/M VISIT ADD ON: HCPCS | Performed by: FAMILY MEDICINE

## 2025-01-27 RX ORDER — ZOLPIDEM TARTRATE 5 MG/1
5 TABLET ORAL NIGHTLY PRN
Qty: 30 TABLET | Refills: 0 | Status: SHIPPED | OUTPATIENT
Start: 2025-01-27 | End: 2025-02-26

## 2025-01-27 RX ORDER — ESTRADIOL AND NORETHINDRONE ACETATE 1; .5 MG/1; MG/1
1 TABLET ORAL DAILY
Qty: 28 TABLET | Refills: 3 | Status: SHIPPED | OUTPATIENT
Start: 2025-01-27

## 2025-01-27 SDOH — ECONOMIC STABILITY: FOOD INSECURITY: WITHIN THE PAST 12 MONTHS, THE FOOD YOU BOUGHT JUST DIDN'T LAST AND YOU DIDN'T HAVE MONEY TO GET MORE.: NEVER TRUE

## 2025-01-27 SDOH — ECONOMIC STABILITY: FOOD INSECURITY: WITHIN THE PAST 12 MONTHS, YOU WORRIED THAT YOUR FOOD WOULD RUN OUT BEFORE YOU GOT MONEY TO BUY MORE.: NEVER TRUE

## 2025-01-27 ASSESSMENT — ENCOUNTER SYMPTOMS
WHEEZING: 0
COUGH: 0
DIARRHEA: 0
CHEST TIGHTNESS: 0
VOMITING: 0
SHORTNESS OF BREATH: 0
BACK PAIN: 0
NAUSEA: 0
CONSTIPATION: 0
BLOOD IN STOOL: 0
ABDOMINAL PAIN: 0
RHINORRHEA: 0
EYE PAIN: 0
SORE THROAT: 0

## 2025-01-27 NOTE — PROGRESS NOTES
Lisa Ivan (:  1970) is a 54 y.o. female,Established patient, here for evaluation of the following chief complaint(s):  Insomnia (Hot flashes)         Assessment & Plan  Sleep disturbance   Chronic, not at goal (unstable), stop doxepin, trial of ambien 5 mg nightly  -monitor sxs, call if not improving    Orders:    zolpidem (AMBIEN) 5 MG tablet; Take 1 tablet by mouth nightly as needed for Sleep for up to 30 days. Max Daily Amount: 5 mg    Controlled Substance Monitoring:    Acute and Chronic Pain Monitoring:   RX Monitoring Periodic Controlled Substance Monitoring   2025   1:20 PM Possible medication side effects, risk of tolerance/dependence & alternative treatments discussed.;No signs of potential drug abuse or diversion identified.         Hot flash, menopausal   Chronic, not at goal (unstable), add activella  -monitor sxs, call if not improving  -mammogram up to date  Orders:    estradiol-norethindrone (ACTIVELLA) 1-0.5 MG per tablet; Take 1 tablet by mouth daily    Hypothyroidism due to acquired atrophy of thyroid   Chronic, at goal (stable), continue current treatment plan         Depression, unspecified depression type   Chronic, at goal (stable), continue current treatment plan           No follow-ups on file.       Subjective   Insomnia  Pertinent negatives include no abdominal pain, chest pain, chills, congestion, coughing, fatigue, fever, headaches, joint swelling, myalgias, nausea, neck pain, rash, sore throat or vomiting.      Patient here today for a check up.  Reviewed BMI of 27.  Encouraged diet, exercise and weight loss.  Having persistent hot flashes ( on effexor).  And trouble sleeping ( doxepin is not helping).  Has all female parts, no periods, has had an ablation.  Mammograms are up to date.  , current smoker, pmh reviewed.       Review of Systems   Constitutional:  Negative for chills, fatigue, fever and unexpected weight change.        Hot flashes   HENT:  Negative

## 2025-01-28 NOTE — TELEPHONE ENCOUNTER
States she maybe did feel palpitations once or twice since she was in but thinks they have improved  Has f/u with Yahir   Good enough!

## 2025-02-03 PROCEDURE — 93297 REM INTERROG DEV EVAL ICPMS: CPT | Performed by: INTERNAL MEDICINE

## 2025-02-03 NOTE — PATIENT INSTRUCTIONS
If your physician has ordered procedures/ testing and you have not been contacted with in 2 days after your office visit,  please call our office.     If you have had your testing performed -  please allow 48 hours for our office to notify you of the results.  If you have not heard from us with in the 48 hrs,  please call the office.     Results for the 14 day and 30 day heart monitor - please allow 7-10 business days after the monitor is mailed back.    You may receive a survey regarding the care you received during your visit.  Your input is valuable to us.  We encourage you to complete and return your survey.  We hope you will choose us in the future for your healthcare needs.  Thank you for choosing [facility]!    Your Medical Assistant Today:  Ashley PEREZ     Your Provider for Today: Dr. Sinclair  Ph. [phone] opt 1

## 2025-02-04 ENCOUNTER — OFFICE VISIT (OUTPATIENT)
Dept: CARDIOLOGY CLINIC | Age: 55
End: 2025-02-04
Payer: COMMERCIAL

## 2025-02-04 VITALS
HEIGHT: 62 IN | SYSTOLIC BLOOD PRESSURE: 126 MMHG | BODY MASS INDEX: 26.68 KG/M2 | HEART RATE: 92 BPM | DIASTOLIC BLOOD PRESSURE: 90 MMHG | WEIGHT: 145 LBS | OXYGEN SATURATION: 99 %

## 2025-02-04 DIAGNOSIS — I50.22 CHF NYHA CLASS II, CHRONIC, SYSTOLIC (HCC): ICD-10-CM

## 2025-02-04 DIAGNOSIS — I50.22 CHRONIC SYSTOLIC HEART FAILURE (HCC): ICD-10-CM

## 2025-02-04 DIAGNOSIS — I47.10 SVT (SUPRAVENTRICULAR TACHYCARDIA) (HCC): Primary | ICD-10-CM

## 2025-02-04 DIAGNOSIS — Z95.810 ICD (IMPLANTABLE CARDIOVERTER-DEFIBRILLATOR) IN PLACE: ICD-10-CM

## 2025-02-04 PROCEDURE — 99205 OFFICE O/P NEW HI 60 MIN: CPT | Performed by: INTERNAL MEDICINE

## 2025-02-04 PROCEDURE — 93000 ELECTROCARDIOGRAM COMPLETE: CPT | Performed by: INTERNAL MEDICINE

## 2025-02-04 NOTE — PROGRESS NOTES
Patient sees  at [facility] transplant Clinic.   CHF patient - Sharon   
IVC/SVC: IVC diameter is less than or equal to 21 mm and decreases   greater than 50% during inspiration; therefore the estimated right atrial   pressure is normal (~3 mmHg). IVC size is normal.    Image quality is adequate.     No results found for this or any previous visit.No results found for this or any previous visit.  Results for orders placed or performed during the hospital encounter of 08/19/24   Echo (TTE) complete (PRN contrast/bubble/strain/3D)   Result Value Ref Range    LA Minor Axis 4.7 cm    LA Major [place] 4.3 cm    LA Area 2C 11.2 cm2    LA Area 4C 12.3 cm2    LA Volume MOD A2C 22 22 - 52 mL    LA Volume MOD A4C 28 22 - 52 mL    LA Volume BP 26 22 - 52 mL    LA Diameter 2.7 cm    AV Cusp Mmode 1.7 cm    AV Peak Velocity 1.3 m/s    AV Peak Gradient 6 mmHg    Ascending Aorta 3.2 cm    LV IVRT 92.0 ms    IVSd 0.9 0.6 - 0.9 cm    LVIDd 4.5 3.9 - 5.3 cm    LVIDs 3.2 cm    LVOT Peak Velocity 0.9 m/s    LVOT Peak Gradient 4 mmHg    LVPWd 0.9 0.6 - 0.9 cm    LV E' Lateral Velocity 12 cm/s    LV E' Septal Velocity 8 cm/s    MV E Wave Deceleration Time 289.0 ms    MV A Velocity 0.86 m/s    MV E Velocity 0.81 m/s    PV Max Velocity 0.7 m/s    PV Peak Gradient 2 mmHg    Est. RA Pressure 3 mmHg    RVIDd 2.7 cm    TAPSE 2.1 1.7 cm    TR Max Velocity 2.36 m/s    TR Peak Gradient 22 mmHg    TV E Wave Velocity 0.6 m/s    Fractional Shortening 2D 29 28 - 44 %    LV RWT Ratio 0.40     LV Mass 2D 132.8 67 - 162 g    MV E/A 0.94     E/E' Ratio (Averaged) 8.44     E/E' Lateral 6.75     E/E' Septal 10.13     AV Velocity Ratio 0.69     RVSP 25 mmHg    Narrative      Left Ventricle: Normal left ventricular systolic function with a   visually estimated EF of 55 - 60%. Left ventricle size is normal. Normal   wall thickness. Normal wall motion.    Right Ventricle: Right ventricle size is normal. Lead present in the   right ventricle.    Tricuspid Valve: The estimated RVSP is 25 mmHg.    Right Atrium: Lead present in the right

## 2025-02-12 ENCOUNTER — HOSPITAL ENCOUNTER (EMERGENCY)
Age: 55
Discharge: HOME | End: 2025-02-12
Payer: COMMERCIAL

## 2025-02-12 VITALS
DIASTOLIC BLOOD PRESSURE: 76 MMHG | HEART RATE: 129 BPM | HEIGHT: 62 IN | TEMPERATURE: 99.8 F | WEIGHT: 140 LBS | BODY MASS INDEX: 25.76 KG/M2 | OXYGEN SATURATION: 99 % | RESPIRATION RATE: 20 BRPM | SYSTOLIC BLOOD PRESSURE: 125 MMHG

## 2025-02-12 DIAGNOSIS — J10.1 INFLUENZA A: Primary | ICD-10-CM

## 2025-02-12 LAB
FLUAV AG SPEC QL: POSITIVE
FLUBV AG SPEC QL: NEGATIVE
SARS-COV-2 RDRP RESP QL NAA+PROBE: NOT  DETECTED

## 2025-02-12 PROCEDURE — 99213 OFFICE O/P EST LOW 20 MIN: CPT

## 2025-02-12 PROCEDURE — 87804 INFLUENZA ASSAY W/OPTIC: CPT

## 2025-02-12 PROCEDURE — 87635 SARS-COV-2 COVID-19 AMP PRB: CPT

## 2025-02-12 RX ORDER — PREDNISONE 20 MG/1
20 TABLET ORAL 2 TIMES DAILY
Qty: 10 TABLET | Refills: 0 | Status: SHIPPED | OUTPATIENT
Start: 2025-02-12 | End: 2025-02-17

## 2025-02-12 RX ORDER — ALBUTEROL SULFATE 90 UG/1
2 INHALANT RESPIRATORY (INHALATION) EVERY 4 HOURS PRN
Qty: 18 G | Refills: 0 | Status: SHIPPED | OUTPATIENT
Start: 2025-02-12

## 2025-02-12 RX ORDER — OSELTAMIVIR PHOSPHATE 75 MG/1
75 CAPSULE ORAL 2 TIMES DAILY
Qty: 10 CAPSULE | Refills: 0 | Status: SHIPPED | OUTPATIENT
Start: 2025-02-12 | End: 2025-02-17

## 2025-02-12 ASSESSMENT — PAIN SCALES - GENERAL: PAINLEVEL_OUTOF10: 9

## 2025-02-12 ASSESSMENT — PAIN DESCRIPTION - DESCRIPTORS: DESCRIPTORS: ACHING

## 2025-02-12 ASSESSMENT — PAIN - FUNCTIONAL ASSESSMENT
PAIN_FUNCTIONAL_ASSESSMENT: 0-10
PAIN_FUNCTIONAL_ASSESSMENT: PREVENTS OR INTERFERES SOME ACTIVE ACTIVITIES AND ADLS

## 2025-02-12 ASSESSMENT — PAIN DESCRIPTION - FREQUENCY: FREQUENCY: CONTINUOUS

## 2025-02-12 ASSESSMENT — PAIN DESCRIPTION - ONSET: ONSET: GRADUAL

## 2025-02-12 ASSESSMENT — ENCOUNTER SYMPTOMS
COUGH: 1
CHEST TIGHTNESS: 1

## 2025-02-12 ASSESSMENT — PAIN DESCRIPTION - PAIN TYPE: TYPE: ACUTE PAIN

## 2025-02-12 ASSESSMENT — PAIN DESCRIPTION - LOCATION: LOCATION: OTHER (COMMENT)

## 2025-02-12 NOTE — ED NOTES
PT GIVEN DISCHARGE INSTRUCTIONS, VERBALIZES UNDERSTANDING.  PT ASSESSMENT UNCHANGED, DISCHARGED IN STABLE CONDITION.        Mansoor Landon, RN  02/12/25 5635

## 2025-02-12 NOTE — DISCHARGE INSTRUCTIONS
Tylenol and motrin  Mucinex   Medication as prescribed  Follow up with pcp as needed  Day 1-5 is the worst  ER if symptoms worsen - chest pain, shortness of breath, uncontrollable fevers

## 2025-02-12 NOTE — ED PROVIDER NOTES
[facility] EMERGENCY DEPARTMENT  Urgent Care Encounter       CHIEF COMPLAINT       Chief Complaint   Patient presents with    Cough    Pharyngitis    Chills    Fever       Nurses Notes reviewed and I agree except as noted in the HPI.  HISTORY OF PRESENT ILLNESS   Lisa Ivan is a 54 y.o. female who presents with complaints of cough, sore throat, fever, chills, chest congestion that started 2 days ago. Pt reports using tylenol, motrin, and mucinex.     The history is provided by the patient.       REVIEW OF SYSTEMS     Review of Systems   Constitutional:  Positive for fatigue and fever.   Respiratory:  Positive for cough and chest tightness.         Chest congestion   Cardiovascular:  Negative for chest pain and palpitations.   Musculoskeletal:  Positive for myalgias.   Neurological:  Positive for headaches.   All other systems reviewed and are negative.      PAST MEDICAL HISTORY         Diagnosis Date    Anxiety     Depression     Diabetes mellitus (HCC)     Hypercholesterolemia     Hyperlipidemia     Hypothyroidism     New onset of congestive heart failure (HCC) 08/04/2022    Type 2 diabetes mellitus without complication (HCC)        SURGICALHISTORY     Patient  has a past surgical history that includes Tubal ligation; ablation of dysrhythmic focus; Tonsillectomy; Cosmetic surgery; Cholecystectomy; Cardiac surgery; and Breast enhancement surgery (Bilateral, 2004).    CURRENT MEDICATIONS       Previous Medications    ACETAMINOPHEN (TYLENOL) 500 MG TABLET    Take 1 tablet by mouth every 6 hours as needed for Pain    DIGOXIN (LANOXIN) 125 MCG TABLET    Take 1 tablet by mouth daily    EMPAGLIFLOZIN (JARDIANCE) 10 MG TABLET    Take 1 tablet by mouth daily    ESTRADIOL-NORETHINDRONE (ACTIVELLA) 1-0.5 MG PER TABLET    Take 1 tablet by mouth daily    FLUOCINONIDE (VANOS) 0.1 % CREA CREAM    APPLY TO RASH TWICE A DAY AS NEEDED    FUROSEMIDE (LASIX) 20 MG TABLET    take 1 tablet by mouth once

## 2025-02-12 NOTE — ED NOTES
Covid and influenza swabs obtained without difficulty and tolerated well.     Mansoor Landon, RN  02/12/25 [id number]

## 2025-02-20 DIAGNOSIS — G47.9 SLEEP DISTURBANCE: ICD-10-CM

## 2025-02-20 RX ORDER — ZOLPIDEM TARTRATE 5 MG/1
5 TABLET ORAL NIGHTLY PRN
Qty: 30 TABLET | Refills: 2 | Status: SHIPPED | OUTPATIENT
Start: 2025-02-22 | End: 2025-05-23

## 2025-02-20 NOTE — TELEPHONE ENCOUNTER
Lisa Ivan called requesting a refill on the following medications:  Requested Prescriptions     Pending Prescriptions Disp Refills    zolpidem (AMBIEN) 5 MG tablet 30 tablet 0     Sig: Take 1 tablet by mouth nightly as needed for Sleep for up to 30 days. Max Daily Amount: 5 mg       Date of last visit: 1/27/2025  Date of next visit (if applicable):Visit date not found  Date of last refill: 01/27/2025  Pharmacy Name: Kisha Urbano CMA ([facility])

## 2025-02-20 NOTE — TELEPHONE ENCOUNTER
Ep'ed ambien to pharm requested    Controlled Substance Monitoring:    Acute and Chronic Pain Monitoring:   RX Monitoring Periodic Controlled Substance Monitoring   2/20/2025   2:19 PM No signs of potential drug abuse or diversion identified.

## 2025-03-14 ENCOUNTER — HOSPITAL ENCOUNTER (OUTPATIENT)
Age: 55
Discharge: HOME | End: 2025-03-14
Payer: COMMERCIAL

## 2025-03-14 DIAGNOSIS — I47.10 SVT (SUPRAVENTRICULAR TACHYCARDIA): ICD-10-CM

## 2025-03-14 DIAGNOSIS — I50.22 CHF NYHA CLASS II, CHRONIC, SYSTOLIC (HCC): ICD-10-CM

## 2025-03-14 PROCEDURE — 80162 ASSAY OF DIGOXIN TOTAL: CPT

## 2025-03-14 PROCEDURE — 80048 BASIC METABOLIC PNL TOTAL CA: CPT

## 2025-03-14 PROCEDURE — 36415 COLL VENOUS BLD VENIPUNCTURE: CPT

## 2025-03-15 LAB
ANION GAP SERPL CALC-SCNC: 13 MEQ/L (ref 8–16)
BUN SERPL-MCNC: 14 MG/DL (ref 8–23)
CALCIUM SERPL-MCNC: 9.9 MG/DL (ref 8.6–10)
CHLORIDE SERPL-SCNC: 101 MEQ/L (ref 98–111)
CO2 SERPL-SCNC: 25 MEQ/L (ref 22–29)
CREAT SERPL-MCNC: 1 MG/DL (ref 0.5–0.9)
DIGOXIN SERPL-MCNC: < 0.3 NG/ML (ref 0.5–2)
GFR SERPL CREATININE-BSD FRML MDRD: 67 ML/MIN/1.73M2
GLUCOSE SERPL-MCNC: 91 MG/DL (ref 74–109)
POTASSIUM SERPL-SCNC: 4.4 MEQ/L (ref 3.5–5.2)
SODIUM SERPL-SCNC: 139 MEQ/L (ref 135–145)

## 2025-03-16 ENCOUNTER — RESULTS FOLLOW-UP (OUTPATIENT)
Dept: CARDIOLOGY CLINIC | Age: 55
End: 2025-03-16

## 2025-03-21 PROCEDURE — 93297 REM INTERROG DEV EVAL ICPMS: CPT | Performed by: INTERNAL MEDICINE

## 2025-04-02 DIAGNOSIS — N95.1 HOT FLASH, MENOPAUSAL: ICD-10-CM

## 2025-04-02 DIAGNOSIS — G47.9 SLEEP DISTURBANCE: ICD-10-CM

## 2025-04-02 RX ORDER — ESTRADIOL AND NORETHINDRONE ACETATE 1; .5 MG/1; MG/1
1 TABLET ORAL DAILY
Qty: 28 TABLET | Refills: 3 | Status: CANCELLED | OUTPATIENT
Start: 2025-04-02

## 2025-04-02 RX ORDER — ZOLPIDEM TARTRATE 5 MG/1
5 TABLET ORAL NIGHTLY PRN
Qty: 30 TABLET | Refills: 2 | Status: CANCELLED | OUTPATIENT
Start: 2025-04-02 | End: 2025-07-01

## 2025-04-15 ENCOUNTER — PATIENT MESSAGE (OUTPATIENT)
Dept: FAMILY MEDICINE CLINIC | Age: 55
End: 2025-04-15

## 2025-04-15 DIAGNOSIS — R73.9 HYPERGLYCEMIA: Primary | ICD-10-CM

## 2025-04-28 ENCOUNTER — TELEPHONE (OUTPATIENT)
Dept: CARDIOLOGY CLINIC | Age: 55
End: 2025-04-28

## 2025-04-28 ENCOUNTER — OFFICE VISIT (OUTPATIENT)
Dept: CARDIOLOGY CLINIC | Age: 55
End: 2025-04-28
Payer: COMMERCIAL

## 2025-04-28 VITALS
DIASTOLIC BLOOD PRESSURE: 81 MMHG | SYSTOLIC BLOOD PRESSURE: 116 MMHG | BODY MASS INDEX: 26.13 KG/M2 | WEIGHT: 142 LBS | HEART RATE: 68 BPM | HEIGHT: 62 IN

## 2025-04-28 DIAGNOSIS — Z95.810 ICD (IMPLANTABLE CARDIOVERTER-DEFIBRILLATOR) IN PLACE: ICD-10-CM

## 2025-04-28 DIAGNOSIS — I42.8 OTHER CARDIOMYOPATHY (HCC): Primary | ICD-10-CM

## 2025-04-28 PROCEDURE — 99214 OFFICE O/P EST MOD 30 MIN: CPT | Performed by: INTERNAL MEDICINE

## 2025-04-28 RX ORDER — FUROSEMIDE 20 MG/1
TABLET ORAL
Qty: 90 TABLET | Refills: 3 | Status: SHIPPED | OUTPATIENT
Start: 2025-04-28

## 2025-04-28 RX ORDER — SACUBITRIL AND VALSARTAN 97; 103 MG/1; MG/1
1 TABLET, FILM COATED ORAL 2 TIMES DAILY
Qty: 180 TABLET | Refills: 3 | Status: SHIPPED | OUTPATIENT
Start: 2025-04-28

## 2025-04-28 RX ORDER — NORETHINDRONE ACETATE AND ETHINYL ESTRADIOL 1; 5 MG/1; UG/1
TABLET ORAL
COMMUNITY
Start: 2025-04-03

## 2025-04-28 NOTE — PROGRESS NOTES
[facility] [place] [facility]  [facility] CARDIOLOGY  [address]  [address]  [facility] [address]  Dept: [phone]  Dept Fax: [phone]  Loc: [phone]    Visit Date: 4/28/2025    Ms. Ivan is a 54 y.o. female  who presented for:  Chief Complaint   Patient presents with    1 Year Follow Up       HPI:     History of Present Illness  The patient is a 54-year-old female who presents for evaluation of heart failure.    A significant improvement in overall health status is reported, with no current complaints of chest pain or discomfort. No respiratory distress, lightheadedness, or dizziness is experienced. Daily activities are maintained without difficulty, and no symptoms suggestive of fluid retention are reported. Medication regimen adherence includes Lasix administered 3 to 4 times weekly, with previous discontinuation of digoxin. An episode of norovirus infection necessitated an emergency room visit, but no shocks were received during this period. A previous trial of Aldactone was discontinued due to abnormal laboratory results. Conscious efforts to monitor diet, focusing on the consumption of turkey, chicken, and vegetables, have resulted in weight loss.          Current Outpatient Medications:   norethindrone-ethinyl estradiol (JINTELI, FYAVOLV) 1-5 MG-MCG TABS per tablet, , Disp: , Rfl:   zolpidem (AMBIEN) 5 MG tablet, Take 1 tablet by mouth nightly as needed for Sleep for up to 90 days. Max Daily Amount: 5 mg, Disp: 30 tablet, Rfl: 2  albuterol sulfate HFA (VENTOLIN HFA) 108 (90 Base) MCG/ACT inhaler, Inhale 2 puffs into the lungs every 4 hours as needed for Wheezing or Shortness of Breath, Disp: 18 g, Rfl: 0  estradiol-norethindrone (ACTIVELLA) 1-0.5 MG per tablet, Take 1 tablet by mouth daily, Disp: 28 tablet, Rfl: 3  levothyroxine (SYNTHROID) 75 MCG tablet, TAKE 1 TABLET DAILY, Disp: 90 tablet, Rfl: 3  venlafaxine (EFFEXOR XR) 150 MG extended release capsule, TAKE 1

## 2025-06-22 PROCEDURE — 93297 REM INTERROG DEV EVAL ICPMS: CPT | Performed by: INTERNAL MEDICINE

## 2025-07-31 PROCEDURE — 93297 REM INTERROG DEV EVAL ICPMS: CPT | Performed by: INTERNAL MEDICINE
